# Patient Record
Sex: FEMALE | Race: WHITE | NOT HISPANIC OR LATINO | Employment: FULL TIME | URBAN - METROPOLITAN AREA
[De-identification: names, ages, dates, MRNs, and addresses within clinical notes are randomized per-mention and may not be internally consistent; named-entity substitution may affect disease eponyms.]

---

## 2019-05-27 ENCOUNTER — OFFICE VISIT (OUTPATIENT)
Dept: URGENT CARE | Facility: CLINIC | Age: 39
End: 2019-05-27
Payer: COMMERCIAL

## 2019-05-27 VITALS
HEIGHT: 69 IN | HEART RATE: 60 BPM | RESPIRATION RATE: 18 BRPM | WEIGHT: 150 LBS | OXYGEN SATURATION: 99 % | SYSTOLIC BLOOD PRESSURE: 125 MMHG | DIASTOLIC BLOOD PRESSURE: 76 MMHG | TEMPERATURE: 98 F | BODY MASS INDEX: 22.22 KG/M2

## 2019-05-27 DIAGNOSIS — J02.9 PHARYNGITIS, UNSPECIFIED ETIOLOGY: Primary | ICD-10-CM

## 2019-05-27 DIAGNOSIS — J02.9 SORE THROAT: ICD-10-CM

## 2019-05-27 LAB
CTP QC/QA: YES
S PYO RRNA THROAT QL PROBE: NEGATIVE

## 2019-05-27 PROCEDURE — 87880 STREP A ASSAY W/OPTIC: CPT | Mod: QW,S$GLB,, | Performed by: FAMILY MEDICINE

## 2019-05-27 PROCEDURE — 87880 POCT RAPID STREP A: ICD-10-PCS | Mod: QW,S$GLB,, | Performed by: FAMILY MEDICINE

## 2019-05-27 PROCEDURE — 3008F BODY MASS INDEX DOCD: CPT | Mod: CPTII,S$GLB,, | Performed by: FAMILY MEDICINE

## 2019-05-27 PROCEDURE — 99214 PR OFFICE/OUTPT VISIT, EST, LEVL IV, 30-39 MIN: ICD-10-PCS | Mod: S$GLB,,, | Performed by: FAMILY MEDICINE

## 2019-05-27 PROCEDURE — 3008F PR BODY MASS INDEX (BMI) DOCUMENTED: ICD-10-PCS | Mod: CPTII,S$GLB,, | Performed by: FAMILY MEDICINE

## 2019-05-27 PROCEDURE — 99000 PR SPECIMEN HANDLING,DR OFF->LAB: ICD-10-PCS | Mod: S$GLB,,, | Performed by: FAMILY MEDICINE

## 2019-05-27 PROCEDURE — 99000 SPECIMEN HANDLING OFFICE-LAB: CPT | Mod: S$GLB,,, | Performed by: FAMILY MEDICINE

## 2019-05-27 PROCEDURE — 99214 OFFICE O/P EST MOD 30 MIN: CPT | Mod: S$GLB,,, | Performed by: FAMILY MEDICINE

## 2019-05-27 PROCEDURE — 87081 CULTURE SCREEN ONLY: CPT

## 2019-05-27 RX ORDER — MULTIVITAMIN
1 TABLET ORAL DAILY
COMMUNITY

## 2019-05-27 RX ORDER — AMOXICILLIN 500 MG
CAPSULE ORAL DAILY
COMMUNITY

## 2019-05-27 RX ORDER — MAGNESIUM 250 MG
TABLET ORAL ONCE
COMMUNITY

## 2019-05-27 NOTE — PATIENT INSTRUCTIONS

## 2019-05-27 NOTE — PROGRESS NOTES
"Subjective:       Patient ID: Wendy Munoz is a 39 y.o. female.    Vitals:  height is 5' 9" (1.753 m) and weight is 68 kg (150 lb). Her tympanic temperature is 97.7 °F (36.5 °C). Her blood pressure is 125/76 and her pulse is 60. Her respiration is 18 and oxygen saturation is 99%.     Chief Complaint: Sore Throat    Patient presents with c.o sore throat that started on Friday. + for pain when swallowing and cervical lymph node pain. Denies fever. Has had strep three times in the within the year. Sxs feel similar. No cough    Sore Throat    This is a new problem. Episode onset: Friday. The problem has been unchanged. The pain is worse on the left side. There has been no fever. Associated symptoms include ear pain. Pertinent negatives include no congestion, coughing, shortness of breath, stridor or vomiting. She has tried NSAIDs for the symptoms. The treatment provided moderate relief.       Constitution: Positive for fatigue. Negative for chills, sweating and fever.   HENT: Positive for ear pain and sore throat. Negative for congestion, sinus pain, sinus pressure and voice change.         Runny nose   Neck: Negative for painful lymph nodes.   Eyes: Negative for eye redness.   Respiratory: Negative for chest tightness, cough, sputum production, bloody sputum, COPD, shortness of breath, stridor, wheezing and asthma.    Gastrointestinal: Negative for nausea and vomiting.   Musculoskeletal: Negative for muscle ache.   Skin: Negative for rash.   Allergic/Immunologic: Negative for seasonal allergies and asthma.   Hematologic/Lymphatic: Negative for swollen lymph nodes.       Objective:      Physical Exam   Constitutional: She appears well-developed and well-nourished.   HENT:   Head: Normocephalic and atraumatic.   Right Ear: External ear normal.   Left Ear: External ear normal.   Mild erythema of pharynx, no exudate , noTurbinate edema and congestion   Eyes: Pupils are equal, round, and reactive to light. " Conjunctivae and EOM are normal.   Neck: Normal range of motion. Neck supple. No thyromegaly present.   Cardiovascular: Normal rate, regular rhythm, normal heart sounds and intact distal pulses.   Pulmonary/Chest: Effort normal and breath sounds normal.   Lymphadenopathy:     She has cervical adenopathy (shoddy anterior cervical adenopathy on the left).   Psychiatric: She has a normal mood and affect. Her behavior is normal. Judgment and thought content normal.   Vitals reviewed.      Assessment:       1. Pharyngitis, unspecified etiology    2. Sore throat        Plan:         Pharyngitis, unspecified etiology  -     Strep A culture, throat    Sore throat  -     POCT rapid strep A    will wait on culture results - to treat since throat is only minimally red and there is no exudate and no fever- if sore throat persists for several weeks and yet culture is negative then recommend ENT eval -I explained this to pt and she agrees to do so      Patient Instructions     Pharyngitis (Sore Throat), Report Pending    Pharyngitis (sore throat) is often due to a virus. It can also be caused by the streptococcus, or strep, bacterium, often called strep throat. Both viral and strep infections can cause throat pain that is worse when swallowing, aching all over with headache, and fever. Both types of infections are contagious. They may be spread by coughing, kissing, or touching others after touching your mouth or nose.  A test has been done to find out whether you (or your child, if your child is the patient) have strep throat. Call this facility or your healthcare provider if you were not given your test results. If the test is positive for strep infection, you will need to take antibiotic medicines. A prescription can be called into your pharmacy at that time. If the test is negative, you probably have a viral pharyngitis. This does not need to be treated with antibiotics. Until you receive the results of the strep test, you  should stay home from work. If your child is being tested, he or she should stay home from school.  Home care  · Rest at home. Drink plenty of fluids so you won't get dehydrated.  · If the test is positive for strep, don't go to work or school for the first 2 days of taking the antibiotics. After this time, you will not be contagious. You can then return to work or school if you are feeling better.   · Take the antibiotic medicine for the full 10 days, even if you feel better. This is very important to make sure the infection is treated. It is also important to prevent drug-resistant germs from developing. If you were given an antibiotic shot, you won't need more antibiotics.  · For children: Use acetaminophen for fever, fussiness, or discomfort. In infants older than 6 months of age, you may use ibuprofen instead of acetaminophen. Talk with your child's healthcare provider before giving these medicines if your child has chronic liver or kidney disease or ever had a stomach ulcer or GI bleeding. Never give aspirin to a child under 18 years of age who is ill with a fever. It may cause severe liver damage.  · For adults: Use acetaminophen or ibuprofen to control pain or fever, unless another medicine was prescribed for this. Talk with your healthcare provider before taking these medicines if you have chronic liver or kidney disease or ever had a stomach ulcer or GI bleeding.  · Use throat lozenges or numbing throat sprays to help reduce pain. Gargling with warm salt water will also help reduce throat pain. For this, dissolve 1/2 teaspoon of salt in 1 glass of warm water. To help soothe a sore throat, children can sip on juice or a popsicle. Children 5 years and older can also suck on a lollipop or hard candy.  · Don't eat salty or spicy foods. These can irritate the throat.  Follow-up care  Follow up with your healthcare provider or our staff if you don't get better over the next week.  When to seek medical  advice  Call your healthcare provider right away if any of these occur:  · Fever as directed by your healthcare provider. For children, seek care if:  ¨ Your child is of any age and has repeated fevers above 104°F (40°C).  ¨ Your child is younger than 2 years of age and has a fever of 100.4°F (38°C) that continues for more than 1 day.  ¨ Your child is 2 years old or older and has a fever of 100.4°F (38°C) that continues for more than 3 days.  · New or worsening ear pain, sinus pain, or headache  · Painful lumps in the back of neck  · Stiff neck  · Lymph nodes are getting larger  · Inability to swallow liquids, excessive drooling, or inability to open mouth wide due to throat pain  · Signs of dehydration (very dark urine or no urine, sunken eyes, dizziness)  · Trouble breathing or noisy breathing  · Muffled voice  · New rash  · Child appears to be getting sicker  Date Last Reviewed: 4/13/2015  © 7012-7937 [employer]. [address] [place] [address], [place] [address]. All rights reserved. This information is not intended as a substitute for professional medical care. Always follow your healthcare professional's instructions.

## 2019-05-30 ENCOUNTER — TELEPHONE (OUTPATIENT)
Dept: URGENT CARE | Facility: CLINIC | Age: 39
End: 2019-05-30

## 2019-05-30 LAB — BACTERIA THROAT CULT: NORMAL

## 2019-05-30 NOTE — TELEPHONE ENCOUNTER
----- Message from Cait Smith NP sent at 5/29/2019  3:08 PM CDT -----  Please let patient know that throat culture is negative.  Ask how she is doing-- if still having sore throat, please have patient follow up with ENT as recommended by Dr. Mckeon.     Cait Smith

## 2019-06-01 ENCOUNTER — TELEPHONE (OUTPATIENT)
Dept: URGENT CARE | Facility: CLINIC | Age: 39
End: 2019-06-01

## 2019-06-01 NOTE — TELEPHONE ENCOUNTER
----- Message from Orin Mckeon, DO sent at 5/30/2019 10:06 PM CDT -----  Please let patient know that throat culture is negative.  Ask how she is doing-- if still having sore throat, please have patient follow up with ENT   Dr Mckeon

## 2019-06-02 ENCOUNTER — TELEPHONE (OUTPATIENT)
Dept: URGENT CARE | Facility: CLINIC | Age: 39
End: 2019-06-02

## 2019-09-04 ENCOUNTER — OFFICE VISIT (OUTPATIENT)
Dept: DERMATOLOGY | Facility: CLINIC | Age: 39
End: 2019-09-04
Payer: COMMERCIAL

## 2019-09-04 DIAGNOSIS — D22.9 MULTIPLE BENIGN NEVI: ICD-10-CM

## 2019-09-04 DIAGNOSIS — D48.5 NEOPLASM OF UNCERTAIN BEHAVIOR OF SKIN: ICD-10-CM

## 2019-09-04 DIAGNOSIS — L82.1 SK (SEBORRHEIC KERATOSIS): ICD-10-CM

## 2019-09-04 DIAGNOSIS — Z12.83 SKIN CANCER SCREENING: Primary | ICD-10-CM

## 2019-09-04 PROCEDURE — 11102 TANGNTL BX SKIN SINGLE LES: CPT | Mod: S$GLB,,, | Performed by: DERMATOLOGY

## 2019-09-04 PROCEDURE — 99999 PR PBB SHADOW E&M-EST. PATIENT-LVL III: CPT | Mod: PBBFAC,,, | Performed by: DERMATOLOGY

## 2019-09-04 PROCEDURE — 88305 TISSUE SPECIMEN TO PATHOLOGY, DERMATOLOGY: ICD-10-PCS | Mod: 26,,, | Performed by: PATHOLOGY

## 2019-09-04 PROCEDURE — 88305 TISSUE EXAM BY PATHOLOGIST: CPT | Performed by: PATHOLOGY

## 2019-09-04 PROCEDURE — 99999 PR PBB SHADOW E&M-EST. PATIENT-LVL III: ICD-10-PCS | Mod: PBBFAC,,, | Performed by: DERMATOLOGY

## 2019-09-04 PROCEDURE — 88305 TISSUE EXAM BY PATHOLOGIST: CPT | Mod: 26,,, | Performed by: PATHOLOGY

## 2019-09-04 PROCEDURE — 11103 PR TANGENTIAL BIOPSY, SKIN, EA ADDTL LESION: ICD-10-PCS | Mod: S$GLB,,, | Performed by: DERMATOLOGY

## 2019-09-04 PROCEDURE — 11102 PR TANGENTIAL BIOPSY, SKIN, SINGLE LESION: ICD-10-PCS | Mod: S$GLB,,, | Performed by: DERMATOLOGY

## 2019-09-04 PROCEDURE — 11103 TANGNTL BX SKIN EA SEP/ADDL: CPT | Mod: S$GLB,,, | Performed by: DERMATOLOGY

## 2019-09-04 PROCEDURE — 99203 OFFICE O/P NEW LOW 30 MIN: CPT | Mod: 25,S$GLB,, | Performed by: DERMATOLOGY

## 2019-09-04 PROCEDURE — 99203 PR OFFICE/OUTPT VISIT, NEW, LEVL III, 30-44 MIN: ICD-10-PCS | Mod: 25,S$GLB,, | Performed by: DERMATOLOGY

## 2019-09-04 NOTE — PROGRESS NOTES
"  Subjective:       Patient ID:  Wnedy Munoz is a 39 y.o. female who presents for   Chief Complaint   Patient presents with    Mole     Patient is here today for a "mole" check.   Pt has a history of  moderate sun exposure in the past.   Pt recalls several blistering sunburns in the past- Yes  Pt has history of tanning bed use- No  Pt has  had moles removed in the past- Yes  Pt has history of melanoma in first degree relatives-  Not that she is aware of    Pt here today for skin check    H/o BCC right clavicle s/p ED&C 03/2018  Pt states she had a mole on her back tested without a biopsy and was told it didn't have any mutations associated with melanoma.        Review of Systems   Constitutional: Negative for fever, chills, weight loss, weight gain, fatigue, night sweats and malaise.   Skin: Positive for sun sensitivity and daily sunscreen use. Negative for recent sunburn and wears hat.   Hematologic/Lymphatic: Does not bruise/bleed easily.        Objective:    Physical Exam   Constitutional: She appears well-developed and well-nourished. No distress.   Genitourinary:         Neurological: She is alert and oriented to person, place, and time. She is not disoriented.   Psychiatric: She has a normal mood and affect.   Skin:   Areas Examined (abnormalities noted in diagram):   Scalp / Hair Palpated and Inspected  Head / Face Inspection Performed  Neck Inspection Performed  Chest / Axilla Inspection Performed  Abdomen Inspection Performed  Genitals / Buttocks / Groin Inspection Performed  Back Inspection Performed  RUE Inspected  LUE Inspection Performed  RLE Inspected  LLE Inspection Performed  Nails and Digits Inspection Performed                      Diagram Legend     Erythematous scaling macule/papule c/w actinic keratosis       Vascular papule c/w angioma      Pigmented verrucoid papule/plaque c/w seborrheic keratosis      Yellow umbilicated papule c/w sebaceous hyperplasia      Irregularly shaped tan " macule c/w lentigo     1-2 mm smooth white papules consistent with Milia      Movable subcutaneous cyst with punctum c/w epidermal inclusion cyst      Subcutaneous movable cyst c/w pilar cyst      Firm pink to brown papule c/w dermatofibroma      Pedunculated fleshy papule(s) c/w skin tag(s)      Evenly pigmented macule c/w junctional nevus     Mildly variegated pigmented, slightly irregular-bordered macule c/w mildly atypical nevus      Flesh colored to evenly pigmented papule c/w intradermal nevus       Pink pearly papule/plaque c/w basal cell carcinoma      Erythematous hyperkeratotic cursted plaque c/w SCC      Surgical scar with no sign of skin cancer recurrence      Open and closed comedones      Inflammatory papules and pustules      Verrucoid papule consistent consistent with wart     Erythematous eczematous patches and plaques     Dystrophic onycholytic nail with subungual debris c/w onychomycosis     Umbilicated papule    Erythematous-base heme-crusted tan verrucoid plaque consistent with inflamed seborrheic keratosis     Erythematous Silvery Scaling Plaque c/w Psoriasis     See annotation      Assessment / Plan:      Neoplasm of uncertain behavior of skin  Shave biopsy procedure note:    Shave biopsy performed after verbal consent including risk of infection, scar, recurrence, need for additional treatment of site. Area prepped with alcohol, anesthetized with approximately 1.0cc of 1% lidocaine with epinephrine. Lesional tissue shaved with razor blade. Hemostasis achieved with application of aluminum chloride followed by hyfrecation. No complications. Dressing applied. Wound care explained.  -     Tissue Specimen To Pathology, Dermatology  -     Tissue Specimen To Pathology, Dermatology  Pathology Orders:     Normal Orders This Visit    Tissue Specimen To Pathology, Dermatology     Questions:    Directional Terms:  Other(comment)    Clinical Information:  r/o atypical nevus Comment - shave    Specific  Site:  R mid back    Tissue Specimen To Pathology, Dermatology     Questions:    Directional Terms:  Other(comment)    Clinical Information:  r/o IDN vs. other Comment - shave    Specific Site:  L axilla        Skin cancer screening  Total body skin examination performed today including at least 12 points as noted in physical examination. Suspicious lesions noted above.  Patient instructed in importance of daily broad spectrum sunscreen use with spf at least 30. Sun avoidance and topical protection/protective clothing discussed.    Multiple benign nevi  Benign-appearing on exam today. Counseled pt to monitor mole(s) and return to clinic if any changes noted or symptoms (bleeding, itching, pain, etc) noted. Brochure provided.    SK (seborrheic keratosis)  These are benign inherited growths without a malignant potential. Reassurance given to patient. No treatment is necessary.   Treatment of benign, asymptomatic lesions may be considered cosmetic.  Warned about risk of hypo- or hyperpigmentation with treatment and risk of recurrence.      Follow up in about 1 year (around 9/4/2020) for skin check or sooner pending biopsy results.

## 2019-09-04 NOTE — PATIENT INSTRUCTIONS
Summer Sun Protection      The [facility] would like to remind you of the importance of sun protection all year round and particularly during the summer when the suns rays are the strongest. It has been proven that both acute and chronic sun exposure damages our cells and leads to skin cancer. Beyond skin cancer, the sun causes 90% of the symptoms of pre-mature skin aging, including wrinkles, lentigines (brown spots), and thin, easily bruised skin. Proper sun protection can help prevent these unwanted conditions.    Many patients report that the dont go in the sun. It has been shown that the average person receives 18 hours of incidental sun exposure per week during activities such as walking through parking lots, driving, or sitting next to windows. This accumulates to several bad sunburns per year!    In choosing sunscreen, you want one that protects against both UVA and UVB rays. It is recommended that you use one of SPF 30 or higher. It is important to apply the sunscreen about 20 minutes prior to sun exposure. Most sunscreens are chemical sunscreens and a reaction must take place in the skin so that they are effective. If they are applied and then you are immediately exposed to the sun or start sweating, this reaction has not had time to take place and you are therefore unprotected. Sunscreen needs to be reapplied every 2 hours if you are participating in water sports or sweating. We recommend Elta MD or Neutrogena Ultra Sheer Dry Touch SPF 55 for daily use; however there are many options and it is most important for you to find one that you will use on a consistent basis.    If you have sensitive skin, you may do best with a sunscreen that contains only physical blockers such as titanium dioxide or zinc oxide. These are typically thicker and harder to apply, however they afford very good protection. Neutrogena Sensitive Skin, Blue Lizard Sensitive Skin (pink top) or Neutrogena Pure  "and Free are popular ones.     Aside from sunscreen, clothes with UV protection, wide brimmed hats, and sunglasses are other means of sun protection that we recommend.                        [facility] - DERMATOLOGY  [address]  [place] [address]  Dept: [phone]  Dept Fax: [phone]                                                                                Shave Biopsy Wound Care    Your doctor has performed a shave biopsy today.  A band aid and vaseline ointment has been placed over the site.  This should remain in place for 24 hours.  It is recommended that you keep the area dry for the first 24 hours.  After 24 hours, you may remove the band aid and wash the area with warm soap and water and apply Vaseline jelly.  Many patients prefer to use Neosporin or Bacitracin ointment.  This is acceptable; however, know that you can develop an allergy to this medication even if you have used it safely for years.  It is important to keep the area moist.  Letting it dry out and get air slows healing time, and will worsen the scar.  Band aid is optional after first 24 hours.      If you notice increasing redness, tenderness, pain, or yellow drainage at the biopsy site, please notify your doctor.  These are signs of an infection.    If your biopsy site is bleeding, apply firm pressure for 15 minutes straight.  Repeat for another 15 minutes, if it is still bleeding.   If the surgical site continues to bleed, then please contact your doctor.      For [facility] users:   You will receive a [facility] notification after the pathologist has finished reviewing your biopsy specimen. Pathology results, however, will not be released online so you will see a "no content" message. Once your dermatologist reviews and clinically correlates your biopsy results, you will either receive a letter in the mail with the results of a phone call from your doctor's office if further explanation or " treatment is warranted.       [address] [place] [address]/ [phone] FAX/ [email or web address]

## 2019-10-03 ENCOUNTER — CLINICAL SUPPORT (OUTPATIENT)
Dept: INTERNAL MEDICINE | Facility: CLINIC | Age: 39
End: 2019-10-03

## 2019-10-03 ENCOUNTER — OFFICE VISIT (OUTPATIENT)
Dept: INTERNAL MEDICINE | Facility: CLINIC | Age: 39
End: 2019-10-03

## 2019-10-03 VITALS
HEIGHT: 69 IN | WEIGHT: 142 LBS | DIASTOLIC BLOOD PRESSURE: 60 MMHG | BODY MASS INDEX: 21.03 KG/M2 | SYSTOLIC BLOOD PRESSURE: 90 MMHG | HEART RATE: 58 BPM

## 2019-10-03 DIAGNOSIS — Z00.00 ROUTINE GENERAL MEDICAL EXAMINATION AT A HEALTH CARE FACILITY: Primary | ICD-10-CM

## 2019-10-03 DIAGNOSIS — Z02.1 ENCOUNTER FOR PRE-EMPLOYMENT EXAMINATION: Primary | ICD-10-CM

## 2019-10-03 PROBLEM — G43.909 MIGRAINE HEADACHE: Status: ACTIVE | Noted: 2019-10-03

## 2019-10-03 LAB
ALBUMIN SERPL BCP-MCNC: 4.5 G/DL (ref 3.5–5.2)
ALP SERPL-CCNC: 62 U/L (ref 55–135)
ALT SERPL W/O P-5'-P-CCNC: 16 U/L (ref 10–44)
ANION GAP SERPL CALC-SCNC: 8 MMOL/L (ref 8–16)
AST SERPL-CCNC: 18 U/L (ref 10–40)
BILIRUB SERPL-MCNC: 0.5 MG/DL (ref 0.1–1)
BUN SERPL-MCNC: 14 MG/DL (ref 6–20)
CALCIUM SERPL-MCNC: 9.7 MG/DL (ref 8.7–10.5)
CHLORIDE SERPL-SCNC: 104 MMOL/L (ref 95–110)
CHOLEST SERPL-MCNC: 158 MG/DL (ref 120–199)
CHOLEST/HDLC SERPL: 2.3 {RATIO} (ref 2–5)
CO2 SERPL-SCNC: 25 MMOL/L (ref 23–29)
CREAT SERPL-MCNC: 1 MG/DL (ref 0.5–1.4)
ERYTHROCYTE [DISTWIDTH] IN BLOOD BY AUTOMATED COUNT: 12.1 % (ref 11.5–14.5)
EST. GFR  (AFRICAN AMERICAN): >60 ML/MIN/1.73 M^2
EST. GFR  (NON AFRICAN AMERICAN): >60 ML/MIN/1.73 M^2
ESTIMATED AVG GLUCOSE: 91 MG/DL (ref 68–131)
GLUCOSE SERPL-MCNC: 96 MG/DL (ref 70–110)
HBA1C MFR BLD HPLC: 4.8 % (ref 4–5.6)
HCT VFR BLD AUTO: 40.3 % (ref 37–48.5)
HDLC SERPL-MCNC: 69 MG/DL (ref 40–75)
HDLC SERPL: 43.7 % (ref 20–50)
HGB BLD-MCNC: 13.9 G/DL (ref 12–16)
LDLC SERPL CALC-MCNC: 79.8 MG/DL (ref 63–159)
MCH RBC QN AUTO: 30.5 PG (ref 27–31)
MCHC RBC AUTO-ENTMCNC: 34.5 G/DL (ref 32–36)
MCV RBC AUTO: 88 FL (ref 82–98)
NONHDLC SERPL-MCNC: 89 MG/DL
PLATELET # BLD AUTO: 292 K/UL (ref 150–350)
PMV BLD AUTO: 9.3 FL (ref 9.2–12.9)
POTASSIUM SERPL-SCNC: 3.9 MMOL/L (ref 3.5–5.1)
PROT SERPL-MCNC: 8.4 G/DL (ref 6–8.4)
RBC # BLD AUTO: 4.56 M/UL (ref 4–5.4)
SODIUM SERPL-SCNC: 137 MMOL/L (ref 136–145)
TRIGL SERPL-MCNC: 46 MG/DL (ref 30–150)
WBC # BLD AUTO: 7.35 K/UL (ref 3.9–12.7)

## 2019-10-03 PROCEDURE — 99385 PREV VISIT NEW AGE 18-39: CPT | Mod: S$GLB,,, | Performed by: INTERNAL MEDICINE

## 2019-10-03 PROCEDURE — 99999 PR PBB SHADOW E&M-EST. PATIENT-LVL III: ICD-10-PCS | Mod: PBBFAC,,, | Performed by: INTERNAL MEDICINE

## 2019-10-03 PROCEDURE — 85027 COMPLETE CBC AUTOMATED: CPT

## 2019-10-03 PROCEDURE — 80053 COMPREHEN METABOLIC PANEL: CPT

## 2019-10-03 PROCEDURE — 83036 HEMOGLOBIN GLYCOSYLATED A1C: CPT

## 2019-10-03 PROCEDURE — 80061 LIPID PANEL: CPT

## 2019-10-03 PROCEDURE — 99999 PR PBB SHADOW E&M-EST. PATIENT-LVL III: CPT | Mod: PBBFAC,,, | Performed by: INTERNAL MEDICINE

## 2019-10-03 PROCEDURE — 99385 PR PREVENTIVE VISIT,NEW,18-39: ICD-10-PCS | Mod: S$GLB,,, | Performed by: INTERNAL MEDICINE

## 2019-10-03 NOTE — PATIENT INSTRUCTIONS
Here are the results of your recent labs and tests.   Any significant abnormalities have been discussed and those recommendations are found in the diagnosis list on this visit summary.      Recent Results (from the past 168 hour(s))   Comprehensive metabolic panel    Collection Time: 10/03/19  8:59 AM   Result Value Ref Range    Sodium 137 136 - 145 mmol/L    Potassium 3.9 3.5 - 5.1 mmol/L    Chloride 104 95 - 110 mmol/L    CO2 25 23 - 29 mmol/L    Glucose 96 70 - 110 mg/dL    BUN, Bld 14 6 - 20 mg/dL    Creatinine 1.0 0.5 - 1.4 mg/dL    Calcium 9.7 8.7 - 10.5 mg/dL    Total Protein 8.4 6.0 - 8.4 g/dL    Albumin 4.5 3.5 - 5.2 g/dL    Total Bilirubin 0.5 0.1 - 1.0 mg/dL    Alkaline Phosphatase 62 55 - 135 U/L    AST 18 10 - 40 U/L    ALT 16 10 - 44 U/L    Anion Gap 8 8 - 16 mmol/L    eGFR if African American >60.0 >60 mL/min/1.73 m^2    eGFR if non African American >60.0 >60 mL/min/1.73 m^2   CBC Without Differential    Collection Time: 10/03/19  8:59 AM   Result Value Ref Range    WBC 7.35 3.90 - 12.70 K/uL    RBC 4.56 4.00 - 5.40 M/uL    Hemoglobin 13.9 12.0 - 16.0 g/dL    Hematocrit 40.3 37.0 - 48.5 %    Mean Corpuscular Volume 88 82 - 98 fL    Mean Corpuscular Hemoglobin 30.5 27.0 - 31.0 pg    Mean Corpuscular Hemoglobin Conc 34.5 32.0 - 36.0 g/dL    RDW 12.1 11.5 - 14.5 %    Platelets 292 150 - 350 K/uL    MPV 9.3 9.2 - 12.9 fL   Lipid panel    Collection Time: 10/03/19  8:59 AM   Result Value Ref Range    Cholesterol 158 120 - 199 mg/dL    Triglycerides 46 30 - 150 mg/dL    HDL 69 40 - 75 mg/dL    LDL Cholesterol 79.8 63.0 - 159.0 mg/dL    Hdl/Cholesterol Ratio 43.7 20.0 - 50.0 %    Total Cholesterol/HDL Ratio 2.3 2.0 - 5.0    Non-HDL Cholesterol 89 mg/dL   Hemoglobin A1c    Collection Time: 10/03/19  8:59 AM   Result Value Ref Range    Hemoglobin A1C 4.8 4.0 - 5.6 %    Estimated Avg Glucose 91 68 - 131 mg/dL

## 2019-10-03 NOTE — PROGRESS NOTES
"Wendy Munoz is a 39 y.o. White female who presents for an Executive health visit with no  other complaints today.   All chronic problems as listed in the active problem list have been stable and well controlled recently. The active problem list was reviewed and reconciled today and is up to date as of today.  Patient Active Problem List   Diagnosis    Migraine headache     Other issues addressed today:NONE.   She denies any recent ER visits or hospitalizations.     She states that she is compliant with all prescribed medications and she has experienced no side effects. I have reviewed all current medications and updated the current medication list during this encounter.     PMFSH: all information reviewed and updated today.     All labs and tests from earlier today reviewed. Abnormalities (if any) are addressed in the assessment and plan.     Review of Systems   Constitutional: Negative for appetite change, chills, fatigue and fever.   HENT: Negative for congestion, hearing loss, postnasal drip, sore throat and tinnitus.    Eyes: Negative for pain, discharge and visual disturbance.   Respiratory: Negative for cough, chest tightness and wheezing.    Cardiovascular: Negative for chest pain, palpitations and leg swelling.   Gastrointestinal: Negative for abdominal pain, blood in stool, diarrhea, nausea and vomiting.   Genitourinary: Negative for dysuria, frequency and urgency.   Musculoskeletal: Negative for arthralgias and back pain.   Skin: Negative for rash.   Neurological: Negative for dizziness, syncope, numbness and headaches.   Hematological: Negative for adenopathy.   Psychiatric/Behavioral: Negative for dysphoric mood and sleep disturbance. The patient is not nervous/anxious.      Vitals:    10/03/19 0902   BP: 90/60   BP Location: Left arm   Patient Position: Sitting   BP Method: Medium (Manual)   Pulse: (!) 58   Weight: 64.4 kg (141 lb 15.6 oz)   Height: 5' 9" (1.753 m)    Body mass index is 20.97 " kg/m².    Physical Exam   Constitutional: She is oriented to person, place, and time. She appears well-developed and well-nourished.   HENT:   Head: Normocephalic and atraumatic.   Right Ear: External ear normal.   Left Ear: External ear normal.   Eyes: Pupils are equal, round, and reactive to light. EOM are normal.   Neck: Neck supple. No JVD present. No thyromegaly present.   Cardiovascular: Normal rate and regular rhythm.   Pulmonary/Chest: Effort normal and breath sounds normal. She has no wheezes. She has no rales.   Abdominal: Soft. Bowel sounds are normal.   Lymphadenopathy:     She has no cervical adenopathy.   Neurological: She is alert and oriented to person, place, and time.   Psychiatric: She has a normal mood and affect. Her behavior is normal.   Vitals reviewed.    ASSESSMENT/PLAN:  Wendy was seen today for executive health.    Diagnoses and all orders for this visit:    Encounter for pre-employment examination    healthy female. F/u with PCP/GYN for well woman exams.   Goals Addressed                 This Visit's Progress     Blood Pressure < 130/80   90/60     Exercise at least 150 minutes per week.        LDL CHOLESTEROL < 160        Maintane normal body weight   On track

## 2019-10-03 NOTE — LETTER
October 3, 2019    Wendy Munoz  [address]  [facility] [id number]             [facility] - Internal Medicine  [address]  [facility] [address]  Phone: [phone]  Fax: [phone] Dear Dr. Munoz:    Thank you for allowing me to serve you and perform your Executive Health exam on 10/3/2019.  Attached to this letter you will find a copy of your After Visit Summary which includes a copy of all labs and other results, your current medical problem list (if any), a list of all diagnoses from today (if any), a list of all current medication, your vital signs (including Blood pressure, Height, weight, BMI) and associated plans/goals that we discussed while you were in the office.     Please continue follow up with your primary care physician for any chronic medical problems and for recommended routine health screenings.         If you have any questions or concerns, please don't hesitate to call.    Sincerely,        FARZANEH Dunn II, MD

## 2020-01-27 ENCOUNTER — OFFICE VISIT (OUTPATIENT)
Dept: PRIMARY CARE CLINIC | Facility: CLINIC | Age: 40
End: 2020-01-27
Payer: COMMERCIAL

## 2020-01-27 VITALS
HEIGHT: 69 IN | DIASTOLIC BLOOD PRESSURE: 72 MMHG | RESPIRATION RATE: 18 BRPM | TEMPERATURE: 99 F | OXYGEN SATURATION: 98 % | SYSTOLIC BLOOD PRESSURE: 115 MMHG | HEART RATE: 70 BPM | WEIGHT: 149.94 LBS | BODY MASS INDEX: 22.21 KG/M2

## 2020-01-27 DIAGNOSIS — J02.9 ACUTE PHARYNGITIS, UNSPECIFIED ETIOLOGY: Primary | ICD-10-CM

## 2020-01-27 DIAGNOSIS — Z20.818 EXPOSURE TO STREP THROAT: ICD-10-CM

## 2020-01-27 DIAGNOSIS — J03.01 RECURRENT STREPTOCOCCAL TONSILLITIS: ICD-10-CM

## 2020-01-27 PROCEDURE — 3008F BODY MASS INDEX DOCD: CPT | Mod: CPTII,S$GLB,, | Performed by: INTERNAL MEDICINE

## 2020-01-27 PROCEDURE — 99213 PR OFFICE/OUTPT VISIT, EST, LEVL III, 20-29 MIN: ICD-10-PCS | Mod: S$GLB,,, | Performed by: INTERNAL MEDICINE

## 2020-01-27 PROCEDURE — 87147 CULTURE TYPE IMMUNOLOGIC: CPT

## 2020-01-27 PROCEDURE — 99999 PR PBB SHADOW E&M-EST. PATIENT-LVL IV: ICD-10-PCS | Mod: PBBFAC,,, | Performed by: INTERNAL MEDICINE

## 2020-01-27 PROCEDURE — 3008F PR BODY MASS INDEX (BMI) DOCUMENTED: ICD-10-PCS | Mod: CPTII,S$GLB,, | Performed by: INTERNAL MEDICINE

## 2020-01-27 PROCEDURE — 99213 OFFICE O/P EST LOW 20 MIN: CPT | Mod: S$GLB,,, | Performed by: INTERNAL MEDICINE

## 2020-01-27 PROCEDURE — 99999 PR PBB SHADOW E&M-EST. PATIENT-LVL IV: CPT | Mod: PBBFAC,,, | Performed by: INTERNAL MEDICINE

## 2020-01-27 PROCEDURE — 87081 CULTURE SCREEN ONLY: CPT

## 2020-01-27 RX ORDER — AMOXICILLIN AND CLAVULANATE POTASSIUM 875; 125 MG/1; MG/1
1 TABLET, FILM COATED ORAL 2 TIMES DAILY
Qty: 20 TABLET | Refills: 0 | Status: SHIPPED | OUTPATIENT
Start: 2020-01-27 | End: 2020-02-06

## 2020-01-27 NOTE — PROGRESS NOTES
Subjective:       Patient ID: Wendy Munoz is a 40 y.o. female.    Chief Complaint: Sore Throat (Since Friday)    Presents urgently c/o sore throat. Her son has culture positive Strep pharyngitis. Her symptoms began three days ago with malaise and sore throat. Also has mild runny nose, no cough. Nausea, no vomiting or diarrhea. Uses Ibuprofen 800mg q 8 hours routinely, so no fever. She requests treatment for Strep throat due to known exposure, and requests referral to ENT because this is her sixth episode in 18 months.     PMH: no chronic medical conditions.   Allergies: Latex.   Non-smoker.     Review of Systems    Objective:    /72, Pulse 70, Temp 98.7  Physical Exam   Constitutional: She appears well-nourished. No distress.   HENT:   Nose: Nose normal.   Mouth/Throat: No oropharyngeal exudate.   Mild injection of oropharynx with no tonsillar edema, no exudate.    Eyes: Conjunctivae are normal. Right eye exhibits no discharge. Left eye exhibits no discharge.   Neck: Normal range of motion. Neck supple.   Mildly tender submandibular gland on left, not swollen.    Cardiovascular: Normal rate, regular rhythm and normal heart sounds.   Pulmonary/Chest: Effort normal and breath sounds normal. No respiratory distress. She has no wheezes. She has no rales.   Skin: Skin is warm and dry.       Assessment:       1. Acute pharyngitis, unspecified etiology    2. Exposure to strep throat    3. Recurrent streptococcal tonsillitis        Plan:       Acute pharyngitis, unspecified etiology  -     Strep A culture, throat  -     amoxicillin-clavulanate 875-125mg (AUGMENTIN) 875-125 mg per tablet; Take 1 tablet by mouth 2 (two) times daily. for 10 days  Dispense: 20 tablet; Refill: 0  -     Continue Ibuprofen prn.    Exposure to strep throat  -     Strep A culture, throat    Recurrent streptococcal tonsillitis  -     Ambulatory referral to ENT

## 2020-01-29 ENCOUNTER — PATIENT MESSAGE (OUTPATIENT)
Dept: PRIMARY CARE CLINIC | Facility: CLINIC | Age: 40
End: 2020-01-29

## 2020-01-29 LAB — BACTERIA THROAT CULT: ABNORMAL

## 2020-02-11 ENCOUNTER — OFFICE VISIT (OUTPATIENT)
Dept: PRIMARY CARE CLINIC | Facility: CLINIC | Age: 40
End: 2020-02-11
Payer: COMMERCIAL

## 2020-02-11 VITALS
HEART RATE: 75 BPM | SYSTOLIC BLOOD PRESSURE: 105 MMHG | TEMPERATURE: 98 F | OXYGEN SATURATION: 98 % | WEIGHT: 144.81 LBS | DIASTOLIC BLOOD PRESSURE: 65 MMHG | BODY MASS INDEX: 21.45 KG/M2 | HEIGHT: 69 IN | RESPIRATION RATE: 18 BRPM

## 2020-02-11 DIAGNOSIS — Z77.21 EXPOSURE TO BODY FLUIDS BY CONTAMINATED HYPODERMIC NEEDLESTICK: ICD-10-CM

## 2020-02-11 DIAGNOSIS — Z11.4 ENCOUNTER FOR SCREENING FOR HIV: ICD-10-CM

## 2020-02-11 DIAGNOSIS — J03.01 RECURRENT STREPTOCOCCAL TONSILLITIS: ICD-10-CM

## 2020-02-11 DIAGNOSIS — J02.9 ACUTE PHARYNGITIS, UNSPECIFIED ETIOLOGY: Primary | ICD-10-CM
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
## 2020-02-11 PROCEDURE — 3008F BODY MASS INDEX DOCD: CPT | Mod: CPTII,S$GLB,, | Performed by: INTERNAL MEDICINE

## 2020-02-11 PROCEDURE — 3008F PR BODY MASS INDEX (BMI) DOCUMENTED: ICD-10-PCS | Mod: CPTII,S$GLB,, | Performed by: INTERNAL MEDICINE

## 2020-02-11 PROCEDURE — 99213 OFFICE O/P EST LOW 20 MIN: CPT | Mod: S$GLB,,, | Performed by: INTERNAL MEDICINE

## 2020-02-11 PROCEDURE — 99999 PR PBB SHADOW E&M-EST. PATIENT-LVL V: ICD-10-PCS | Mod: PBBFAC,,, | Performed by: INTERNAL MEDICINE

## 2020-02-11 PROCEDURE — 87081 CULTURE SCREEN ONLY: CPT

## 2020-02-11 PROCEDURE — 99999 PR PBB SHADOW E&M-EST. PATIENT-LVL V: CPT | Mod: PBBFAC,,, | Performed by: INTERNAL MEDICINE

## 2020-02-11 PROCEDURE — 99213 PR OFFICE/OUTPT VISIT, EST, LEVL III, 20-29 MIN: ICD-10-PCS | Mod: S$GLB,,, | Performed by: INTERNAL MEDICINE

## 2020-02-11 NOTE — PROGRESS NOTES
[facility] Note    Chief Complaint      Chief Complaint   Patient presents with    Sore Throat       History of Present Illness      Wendy Munoz is a 40 y.o. WF with recurrent Strep pharyngitis     Recurrent Strep Pharyngitis - She reportedly has had strep throat 6 times in the past yr.  Per pt, all but one were dx with a + Rapid strep or + Throat Cx. Started in summer if 2018.  Had recurrence Dec. 2018 and then Feb. 2019. She then had Strep Throat Tx in Dec. 2019 with Amoxil. 3 days later her symptoms returned and was tx with Augmentin x 10 days 1/4/20. Her son had step throat in Jan and she was tested and was tx with Augmentin again 1/27/20 which she completed last wk. Sx's of sore throat started on Friday.  + Nausea and lethargy. ? If carrier but I suspect she may be.  Will refer to ENT.  She prev had appt but had to cancel it.  Consider A/I referral.   Pt denies any other frequent infections.     HCM - Flu - 10/3/19;  MGM - none- + fam h/o Breast Ca - mom at 49 yo; PCP - none     Past Medical History:  Past Medical History:   Diagnosis Date    Migraine        Past Surgical History:   has a past surgical history that includes Hysteroscopy w/ polypectomy; Hernia repair; and Removal of intrauterine device (IUD).    Family History:  family history includes Breast cancer (age of onset: 50) in her mother; Hypertension in her father; Prostate cancer in her father.     Social History:  Social History     Tobacco Use    Smoking status: Never Smoker    Smokeless tobacco: Never Used   Substance Use Topics    Alcohol use: Yes     Drinks per session: 1 or 2     Comment: few times a week    Drug use: Never       Review of Systems   Constitutional: Positive for chills and malaise/fatigue. Negative for fever.   HENT: Positive for sore throat. Negative for ear pain.    Respiratory: Negative for cough.    Cardiovascular: Negative for chest pain and palpitations.   Gastrointestinal: Positive for  "nausea. Negative for abdominal pain and vomiting.   Musculoskeletal: Negative for joint pain and myalgias.   Neurological: Positive for headaches. Negative for dizziness.        Medications:  Outpatient Encounter Medications as of 2/11/2020   Medication Sig Dispense Refill    fish oil-omega-3 fatty acids 300-1,000 mg capsule Take by mouth once daily.      magnesium 250 mg Tab Take by mouth once.      multivitamin (ONE DAILY MULTIVITAMIN) per tablet Take 1 tablet by mouth once daily.       No facility-administered encounter medications on file as of 2/11/2020.        Current Outpatient Medications:     fish oil-omega-3 fatty acids 300-1,000 mg capsule, Take by mouth once daily., Disp: , Rfl:     magnesium 250 mg Tab, Take by mouth once., Disp: , Rfl:     multivitamin (ONE DAILY MULTIVITAMIN) per tablet, Take 1 tablet by mouth once daily., Disp: , Rfl:     Allergies:  Review of patient's allergies indicates:   Allergen Reactions    Latex, natural rubber      Rash/itching       Health Maintenance:    There is no immunization history on file for this patient.   Health Maintenance   Topic Date Due    Pap Smear with HPV Cotest  01/03/2002    TETANUS VACCINE  08/16/2026    Lipid Panel  Completed      Objective:      Vital Signs  Temp: 98 °F (36.7 °C)  Temp src: Oral  Pulse: 75  Resp: 18  SpO2: 98 %  BP: 105/65  BP Location: Right arm  Patient Position: Sitting  Pain Score: 0-No pain  Height and Weight  Height: 5' 9" (175.3 cm)  Weight: 65.7 kg (144 lb 13.5 oz)  BSA (Calculated - sq m): 1.79 sq meters  BMI (Calculated): 21.4  Weight in (lb) to have BMI = 25: 168.9]    Laboratory:  CBC:  Recent Labs   Lab 10/03/19  0859   WBC 7.35   RBC 4.56   Hemoglobin 13.9   Hematocrit 40.3   Platelets 292   Mean Corpuscular Volume 88   Mean Corpuscular Hemoglobin 30.5   Mean Corpuscular Hemoglobin Conc 34.5       CMP:  Recent Labs   Lab 10/03/19  0859   Glucose 96   Calcium 9.7   Albumin 4.5   Total Protein 8.4   Sodium 137 "   Potassium 3.9   CO2 25   Chloride 104   BUN, Bld 14   Creatinine 1.0   eGFR if African American >60.0   eGFR if non African American >60.0   Alkaline Phosphatase 62   ALT 16   AST 18   Total Bilirubin 0.5       URINALYSIS:              LIPIDS:  Recent Labs   Lab 10/03/19  0859   HDL 69   Cholesterol 158   Triglycerides 46   LDL Cholesterol 79.8   Hdl/Cholesterol Ratio 43.7   Non-HDL Cholesterol 89   Total Cholesterol/HDL Ratio 2.3       TSH:        A1C:  Recent Labs   Lab 10/03/19  0859   Hemoglobin A1C 4.8     Physical Exam   Constitutional: She is oriented to person, place, and time. She appears well-developed and well-nourished. No distress.   HENT:   Head: Normocephalic and atraumatic.   Right Ear: External ear normal.   Left Ear: External ear normal.   Mouth/Throat: Oropharynx is clear and moist. No oropharyngeal exudate.   Eyes: Pupils are equal, round, and reactive to light. EOM are normal.   Neck: Normal range of motion. Neck supple. No thyromegaly present.   Cardiovascular: Normal rate, regular rhythm, normal heart sounds and intact distal pulses.   No murmur heard.  Pulmonary/Chest: Effort normal and breath sounds normal. No respiratory distress.   Abdominal: Soft. Bowel sounds are normal. She exhibits no distension.   Musculoskeletal: Normal range of motion.   Lymphadenopathy:     She has cervical adenopathy.   Neurological: She is alert and oriented to person, place, and time.   Skin: Skin is warm and dry. She is not diaphoretic.   Psychiatric: She has a normal mood and affect.   Nursing note and vitals reviewed.          Assessment:       1. Acute pharyngitis, unspecified etiology    2. Recurrent streptococcal tonsillitis    3. Encounter for screening for HIV    4. Exposure to body fluids by contaminated hypodermic needlestick        Wendy Munoz is a 40 y.o.female with:    1. Acute pharyngitis, unspecified etiology  - Strep A culture, throat  -Will check throat Culture.  Exam is unremarkable.   Will tx if Cx positive.     2. Recurrent streptococcal tonsillitis  - Ambulatory referral/consult to ENT; Future  -Refer to ENT.     3. Encounter for screening for HIV  - HIV 1/2 Ag/Ab (4th Gen); Future    4. 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       Chronic conditions status updated as per HPI.  Other than changes above, cont current medications and maintain follow up with specialists.  Return to clinic as needed.    Ofelia Arango MD  [facility]

## 2020-02-12 ENCOUNTER — TELEPHONE (OUTPATIENT)
Dept: PRIMARY CARE CLINIC | Facility: CLINIC | Age: 40
End: 2020-02-12

## 2020-02-12 NOTE — TELEPHONE ENCOUNTER
I spoke to pt. I had opted not to order the thrid test and have her see ENT first.   She awaits phone call to schedule labs.     Dr. LOVE

## 2020-02-12 NOTE — TELEPHONE ENCOUNTER
Pt stated on d/c yesterday that you were going to add a third lab test to be performed. I only see hepC AB and HIV orders

## 2020-02-13 ENCOUNTER — TELEPHONE (OUTPATIENT)
Dept: PRIMARY CARE CLINIC | Facility: CLINIC | Age: 40
End: 2020-02-13

## 2020-02-13 NOTE — TELEPHONE ENCOUNTER
----- Message from Ofelia Arango MD sent at 2/13/2020 12:57 PM CST -----  Please inform pt her Strep culture shows no growth preliminaryy.  Still await final    Dr. LOVE

## 2020-02-14 LAB — BACTERIA THROAT CULT: NORMAL

## 2020-02-17 ENCOUNTER — PATIENT MESSAGE (OUTPATIENT)
Dept: PRIMARY CARE CLINIC | Facility: CLINIC | Age: 40
End: 2020-02-17

## 2020-03-04 ENCOUNTER — PATIENT MESSAGE (OUTPATIENT)
Dept: PRIMARY CARE CLINIC | Facility: CLINIC | Age: 40
End: 2020-03-04

## 2020-03-11 ENCOUNTER — LAB VISIT (OUTPATIENT)
Dept: LAB | Facility: OTHER | Age: 40
End: 2020-03-11
Attending: INTERNAL MEDICINE
Payer: COMMERCIAL

## 2020-03-11 DIAGNOSIS — Z77.21 EXPOSURE TO BODY FLUIDS BY CONTAMINATED HYPODERMIC NEEDLESTICK: ICD-10-CM

## 2020-03-11 DIAGNOSIS — Z11.4 ENCOUNTER FOR SCREENING FOR HIV: ICD-10-CM
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
## 2020-03-11 PROCEDURE — 86803 HEPATITIS C AB TEST: CPT

## 2020-03-11 PROCEDURE — 86703 HIV-1/HIV-2 1 RESULT ANTBDY: CPT

## 2020-03-11 PROCEDURE — 36415 COLL VENOUS BLD VENIPUNCTURE: CPT

## 2020-03-12 LAB
HCV AB SERPL QL IA: NEGATIVE
HIV 1+2 AB+HIV1 P24 AG SERPL QL IA: NEGATIVE

## 2020-03-13 NOTE — PROGRESS NOTES
I sent pt a my chart message -  I reviewed your labs and your Hep C screen and HIV were negative.    Dr. LOVE

## 2020-03-26 ENCOUNTER — TELEPHONE (OUTPATIENT)
Dept: INTERNAL MEDICINE | Facility: CLINIC | Age: 40
End: 2020-03-26

## 2020-03-26 DIAGNOSIS — Z20.822 CLOSE EXPOSURE TO COVID-19 VIRUS: Primary | ICD-10-CM

## 2020-03-27 ENCOUNTER — CLINICAL SUPPORT (OUTPATIENT)
Dept: INTERNAL MEDICINE | Facility: CLINIC | Age: 40
End: 2020-03-27
Payer: COMMERCIAL

## 2020-03-27 DIAGNOSIS — Z20.822 CLOSE EXPOSURE TO COVID-19 VIRUS: ICD-10-CM

## 2020-03-27 PROCEDURE — 99999 PR PBB SHADOW E&M-EST. PATIENT-LVL I: CPT | Mod: PBBFAC,,,

## 2020-03-27 PROCEDURE — 99999 PR PBB SHADOW E&M-EST. PATIENT-LVL I: ICD-10-PCS | Mod: PBBFAC,,,

## 2020-03-27 PROCEDURE — U0002 COVID-19 LAB TEST NON-CDC: HCPCS

## 2020-03-29 LAB — SARS-COV-2 RNA RESP QL NAA+PROBE: NOT DETECTED

## 2020-03-30 DIAGNOSIS — R06.02 SHORTNESS OF BREATH: Primary | ICD-10-CM

## 2020-03-30 DIAGNOSIS — J45.990 EXERCISE-INDUCED ASTHMA: ICD-10-CM

## 2020-03-30 RX ORDER — ALBUTEROL SULFATE 90 UG/1
2 AEROSOL, METERED RESPIRATORY (INHALATION) EVERY 6 HOURS PRN
Qty: 18 G | Refills: 1 | Status: SHIPPED | OUTPATIENT
Start: 2020-03-30 | End: 2023-06-28

## 2020-03-30 NOTE — PROGRESS NOTES
I d/w pt her COVID -19 test was negative.  She is going to continue to monitor her Sx's.  She reports s/o SOB x 10 days.  She does have a h/o Exercise - Induced Asthma and has not needed an inhaler in several yrs.  Will send a Rx for Albuterol to see if this helps. 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 She will alert me for any concerning or worsening of Sx's.     Dr. LOVE

## 2020-04-21 DIAGNOSIS — Z01.84 ANTIBODY RESPONSE EXAMINATION: ICD-10-CM

## 2020-04-22 ENCOUNTER — LAB VISIT (OUTPATIENT)
Dept: LAB | Facility: OTHER | Age: 40
End: 2020-04-22
Attending: INTERNAL MEDICINE
Payer: COMMERCIAL

## 2020-04-22 DIAGNOSIS — Z01.84 ANTIBODY RESPONSE EXAMINATION: ICD-10-CM

## 2020-04-22 PROCEDURE — 36415 COLL VENOUS BLD VENIPUNCTURE: CPT

## 2020-04-22 PROCEDURE — 86769 SARS-COV-2 COVID-19 ANTIBODY: CPT

## 2020-04-23 LAB — SARS-COV-2 IGG SERPL QL IA: NEGATIVE

## 2020-07-29 ENCOUNTER — OFFICE VISIT (OUTPATIENT)
Dept: PRIMARY CARE CLINIC | Facility: CLINIC | Age: 40
End: 2020-07-29
Payer: COMMERCIAL

## 2020-07-29 ENCOUNTER — LAB VISIT (OUTPATIENT)
Dept: LAB | Facility: HOSPITAL | Age: 40
End: 2020-07-29
Attending: INTERNAL MEDICINE
Payer: COMMERCIAL

## 2020-07-29 VITALS
BODY MASS INDEX: 21.87 KG/M2 | RESPIRATION RATE: 18 BRPM | TEMPERATURE: 98 F | OXYGEN SATURATION: 99 % | HEART RATE: 82 BPM | HEIGHT: 69 IN | DIASTOLIC BLOOD PRESSURE: 62 MMHG | SYSTOLIC BLOOD PRESSURE: 105 MMHG | WEIGHT: 147.69 LBS

## 2020-07-29 DIAGNOSIS — F41.9 ANXIETY: ICD-10-CM

## 2020-07-29 DIAGNOSIS — R00.2 PALPITATIONS: ICD-10-CM

## 2020-07-29 DIAGNOSIS — R00.2 PALPITATIONS: Primary | ICD-10-CM

## 2020-07-29 LAB
T3FREE SERPL-MCNC: 3.1 PG/ML (ref 2.3–4.2)
T4 FREE SERPL-MCNC: 1.05 NG/DL (ref 0.71–1.51)
TSH SERPL DL<=0.005 MIU/L-ACNC: 0.97 UIU/ML (ref 0.4–4)

## 2020-07-29 PROCEDURE — 93010 EKG 12-LEAD: ICD-10-PCS | Mod: S$GLB,,, | Performed by: INTERNAL MEDICINE

## 2020-07-29 PROCEDURE — 99999 PR PBB SHADOW E&M-EST. PATIENT-LVL IV: ICD-10-PCS | Mod: PBBFAC,,, | Performed by: INTERNAL MEDICINE

## 2020-07-29 PROCEDURE — 99213 PR OFFICE/OUTPT VISIT, EST, LEVL III, 20-29 MIN: ICD-10-PCS | Mod: S$GLB,,, | Performed by: INTERNAL MEDICINE

## 2020-07-29 PROCEDURE — 84439 ASSAY OF FREE THYROXINE: CPT

## 2020-07-29 PROCEDURE — 36415 COLL VENOUS BLD VENIPUNCTURE: CPT | Mod: PN

## 2020-07-29 PROCEDURE — 84443 ASSAY THYROID STIM HORMONE: CPT

## 2020-07-29 PROCEDURE — 93005 EKG 12-LEAD: ICD-10-PCS | Mod: S$GLB,,, | Performed by: INTERNAL MEDICINE

## 2020-07-29 PROCEDURE — 93005 ELECTROCARDIOGRAM TRACING: CPT | Mod: S$GLB,,, | Performed by: INTERNAL MEDICINE

## 2020-07-29 PROCEDURE — 84481 FREE ASSAY (FT-3): CPT

## 2020-07-29 PROCEDURE — 99213 OFFICE O/P EST LOW 20 MIN: CPT | Mod: S$GLB,,, | Performed by: INTERNAL MEDICINE

## 2020-07-29 PROCEDURE — 99999 PR PBB SHADOW E&M-EST. PATIENT-LVL IV: CPT | Mod: PBBFAC,,, | Performed by: INTERNAL MEDICINE

## 2020-07-29 PROCEDURE — 3008F PR BODY MASS INDEX (BMI) DOCUMENTED: ICD-10-PCS | Mod: CPTII,S$GLB,, | Performed by: INTERNAL MEDICINE

## 2020-07-29 PROCEDURE — 3008F BODY MASS INDEX DOCD: CPT | Mod: CPTII,S$GLB,, | Performed by: INTERNAL MEDICINE

## 2020-07-29 PROCEDURE — 93010 ELECTROCARDIOGRAM REPORT: CPT | Mod: S$GLB,,, | Performed by: INTERNAL MEDICINE

## 2020-07-29 NOTE — PROGRESS NOTES
"[facility] Note    Chief Complaint      Chief Complaint   Patient presents with    Palpitations       History of Present Illness      Wendy Munoz is a 40 y.o.  WF with recurrent Strep pharyngitis presents with c/o Palpitations.     Palpitations - She has had 2 episodes of tachycardia.  First episode 3-4 wks ago that lasted 5 minutes while putting son in time-out.  Felt some pain in her neck and worried she dissected something in her neck and then like she couldn't breathe and heart started to race. She also felt tingling in Juan Ramon hands. The second episode was 3 nights ago. Lasted 2 minutes.  It occurred while lying in bed.  Older son couldn't sleep and came to sleep with her and her .  It annoyed her but did not stress her out.  She then felt like her HR went up and she couldn't breathe.  Her HR was 120 again. No numbness/tingling. "The neck pain had been there that day and I thought "I hope it's not a trigger". She thinks her Left neck pain may be related to her lying in bed with her 3 y.o. in his tiny bed trying to get him to sleep. "I also got chills and shaking both times". The neck pain is like a dull ache that radiates up to my jaw.     Recurrent Strep Pharyngitis - She reportedly has had strep throat 6 times in the past yr.  Per pt, all but one were dx with a + Rapid strep or + Throat Cx. Started in summer if 2018.  Had recurrence Dec. 2018 and then Feb. 2019. She then had Strep Throat Tx in Dec. 2019 with Amoxil. 3 days later her symptoms returned and was tx with Augmentin x 10 days 1/4/20. Prev referred to ENT.  She prev had appt but had to cancel it.  Consider A/I referral.   Pt denies any other frequent infections.      HCM - Flu - 10/3/19;  MGM - none- + fam h/o Breast Ca - mom at 49 yo; PCP - none          Past Medical History:  Past Medical History:   Diagnosis Date    Migraine        Past Surgical History:   has a past surgical history that includes Hysteroscopy w/ " "polypectomy; Hernia repair; and Removal of intrauterine device (IUD).    Family History:  family history includes Breast cancer (age of onset: 50) in her mother; Hypertension in her father; Prostate cancer in her father.     Social History:  Social History     Tobacco Use    Smoking status: Never Smoker    Smokeless tobacco: Never Used   Substance Use Topics    Alcohol use: Yes     Frequency: 2-3 times a week     Drinks per session: 1 or 2     Binge frequency: Never     Comment: few times a week    Drug use: Never       Review of Systems   Constitutional: Negative for chills and fever.   HENT: Negative for hearing loss.         No loss of smell/taste   Eyes: Negative for discharge.   Respiratory: Positive for shortness of breath. Negative for cough and wheezing.         Only with her 2 episodes of palpitations.    Cardiovascular: Positive for palpitations. Negative for chest pain.         is an ER trained palliative medicine physician and felt her Pulse and reported it was regular rhythm but tachycardic.   Gastrointestinal: Negative for blood in stool, constipation, diarrhea, nausea and vomiting.   Genitourinary: Negative for dysuria and hematuria.   Musculoskeletal: Positive for neck pain.   Neurological: Negative for dizziness, weakness and headaches.   Endo/Heme/Allergies: Negative for polydipsia.   Psychiatric/Behavioral: The patient is nervous/anxious.         "My kids are stressing me out. It's constant time outs.  My 4 y.o. is a handful.  To have the kids home and constatnly fighting for sure adds to my stress level".         Medications:  Outpatient Encounter Medications as of 7/29/2020   Medication Sig Dispense Refill    albuterol (PROAIR HFA) 90 mcg/actuation inhaler Inhale 2 puffs into the lungs every 6 (six) hours as needed for Wheezing. Rescue 18 g 1    fish oil-omega-3 fatty acids 300-1,000 mg capsule Take by mouth once daily.      magnesium 250 mg Tab Take by mouth once.      " "multivitamin (ONE DAILY MULTIVITAMIN) per tablet Take 1 tablet by mouth once daily.       No facility-administered encounter medications on file as of 7/29/2020.        Current Outpatient Medications:     albuterol (PROAIR HFA) 90 mcg/actuation inhaler, Inhale 2 puffs into the lungs every 6 (six) hours as needed for Wheezing. Rescue, Disp: 18 g, Rfl: 1    fish oil-omega-3 fatty acids 300-1,000 mg capsule, Take by mouth once daily., Disp: , Rfl:     magnesium 250 mg Tab, Take by mouth once., Disp: , Rfl:     multivitamin (ONE DAILY MULTIVITAMIN) per tablet, Take 1 tablet by mouth once daily., Disp: , Rfl:     Allergies:  Review of patient's allergies indicates:   Allergen Reactions    Latex, natural rubber      Rash/itching       Health Maintenance:    There is no immunization history on file for this patient.   Health Maintenance   Topic Date Due    TETANUS VACCINE  08/16/2026    Hepatitis C Screening  Completed    Lipid Panel  Completed      Objective:      Vital Signs  Temp: 98.1 °F (36.7 °C)  Pulse: 82  Resp: 18  SpO2: 99 %  BP: 105/62  BP Location: Left arm  Patient Position: Sitting  Pain Score: 0-No pain  Height and Weight  Height: 5' 9" (175.3 cm)  Weight: 67 kg (147 lb 11.3 oz)  BSA (Calculated - sq m): 1.81 sq meters  BMI (Calculated): 21.8  Weight in (lb) to have BMI = 25: 168.9]    Laboratory:  CBC:  Recent Labs   Lab 10/03/19  0859   WBC 7.35   RBC 4.56   Hemoglobin 13.9   Hematocrit 40.3   Platelets 292   Mean Corpuscular Volume 88   Mean Corpuscular Hemoglobin 30.5   Mean Corpuscular Hemoglobin Conc 34.5       CMP:  Recent Labs   Lab 10/03/19  0859   Glucose 96   Calcium 9.7   Albumin 4.5   Total Protein 8.4   Sodium 137   Potassium 3.9   CO2 25   Chloride 104   BUN, Bld 14   Creatinine 1.0   eGFR if African American >60.0   eGFR if non African American >60.0   Alkaline Phosphatase 62   ALT 16   AST 18   Total Bilirubin 0.5       LIPIDS:  Recent Labs   Lab 10/03/19  0859   HDL 69   Cholesterol " 158   Triglycerides 46   LDL Cholesterol 79.8   Hdl/Cholesterol Ratio 43.7   Non-HDL Cholesterol 89   Total Cholesterol/HDL Ratio 2.3       A1C:  Recent Labs   Lab 10/03/19  0859   Hemoglobin A1C 4.8       Lab Results   Component Value Date    HEPCAB Negative 03/11/2020       Physical Exam  Vitals signs reviewed.   Constitutional:       General: She is not in acute distress.     Appearance: Normal appearance. She is not ill-appearing, toxic-appearing or diaphoretic.   HENT:      Head: Normocephalic and atraumatic.      Right Ear: Tympanic membrane normal.      Left Ear: Tympanic membrane normal.   Eyes:      Extraocular Movements: Extraocular movements intact.      Conjunctiva/sclera: Conjunctivae normal.      Pupils: Pupils are equal, round, and reactive to light.   Neck:      Musculoskeletal: Normal range of motion and neck supple. No neck rigidity or muscular tenderness.      Vascular: No carotid bruit.   Cardiovascular:      Rate and Rhythm: Normal rate and regular rhythm.      Pulses: Normal pulses.      Heart sounds: Normal heart sounds.      Comments: HR -64 bpm  Pulmonary:      Effort: Pulmonary effort is normal. No respiratory distress.      Breath sounds: Normal breath sounds. No stridor. No wheezing or rhonchi.   Abdominal:      General: Bowel sounds are normal. There is no distension.      Palpations: Abdomen is soft.      Tenderness: There is no abdominal tenderness. There is no guarding or rebound.   Musculoskeletal:      Comments: FROm of neck;  NTTP   Lymphadenopathy:      Cervical: No cervical adenopathy.   Skin:     General: Skin is warm and dry.   Neurological:      General: No focal deficit present.      Mental Status: She is alert and oriented to person, place, and time.   Psychiatric:         Mood and Affect: Mood normal.         Behavior: Behavior normal.             Assessment:       1. Palpitations        Wendy Munoz is a 40 y.o.female with:    1. Palpitations  - IN OFFICE EKG  12-LEAD (to [facility])  - TSH; Future  - T3, free; Future  - T4, free; Future  -Suspect poss Panic Attacks.  Will cont to monitor and call for any further episodes.  Exam - wnl.  Will check EKG and TFT's.  If persists may need to consider Event Monitor.      2. Anxiety  - Pt not interested in further intervention at this time but will continue to monitor and alert me if this changes. We talked about taking time for herself  To destress.  She finds her  very supportive and helpful.    Chronic conditions status updated as per HPI.  Other than changes above, cont current medications and maintain follow up with specialists.  Return to clinic 3 mos for well visit or sooner if needed.    Ofelia Arango MD  [facility]                  Answers for HPI/ROS submitted by the patient on 7/28/2020   activity change: No  unexpected weight change: No  rhinorrhea: No  trouble swallowing: No  visual disturbance: No  chest tightness: No  polyuria: No  difficulty urinating: No  menstrual problem: No  joint swelling: No  arthralgias: No  confusion: No  dysphoric mood: No

## 2020-07-29 NOTE — PROGRESS NOTES
I sent pt a my chart message -  I reviewed your labs and all of your Thyroid functions were normal.    Dr. LOVE

## 2020-08-27 ENCOUNTER — CLINICAL SUPPORT (OUTPATIENT)
Dept: URGENT CARE | Facility: CLINIC | Age: 40
End: 2020-08-27
Payer: COMMERCIAL

## 2020-08-27 VITALS — HEART RATE: 99 BPM

## 2020-08-27 DIAGNOSIS — R09.89 RUNNY NOSE: ICD-10-CM

## 2020-08-27 DIAGNOSIS — R05.9 COUGH: Primary | ICD-10-CM

## 2020-08-27 DIAGNOSIS — R05.9 COUGH: ICD-10-CM

## 2020-08-27 LAB
CTP QC/QA: YES
SARS-COV-2 RDRP RESP QL NAA+PROBE: NEGATIVE

## 2020-08-27 PROCEDURE — U0002: ICD-10-PCS | Mod: S$GLB,,, | Performed by: INTERNAL MEDICINE

## 2020-08-27 PROCEDURE — U0002 COVID-19 LAB TEST NON-CDC: HCPCS | Mod: S$GLB,,, | Performed by: INTERNAL MEDICINE

## 2020-08-27 NOTE — PROGRESS NOTES
This test utilizes isothermal nucleic acid amplification   technology to detect the SARS-CoV-2 RdRp nucleic acid segment.   The analytical sensitivity (limit of detection) is 125 genome   equivalents/mL.   A POSITIVE result implies infection with the SARS-CoV-2 virus;   the patient is presumed to be contagious.     A NEGATIVE result means that SARS-CoV-2 nucleic acids are not   present above the limit of detection. A NEGATIVE result should be   treated as presumptive. It does not rule out the possibility of   COVID-19 and should not be the sole basis for treatment decisions.   If COVID-19 is strongly suspected based on clinical and exposure   history, re-testing using an alternate molecular assay should be   considered.   This test is only for use under the Food and Drug   Administration s Emergency Use Authorization (EUA).   Commercial kits are provided by [employer].   Performance characteristics of the EUA have been independently   verified by [facility] Department of   Pathology and Laboratory Medicine.   _________________________________________________________________   The authorized Fact Sheet for Healthcare Providers and the authorized Fact   Sheet for Patients of the ID NOW COVID-19 are available on the FDA   website:     https://www.fda.gov/media/888900/download  https://www.fda.gov/media/015550/download

## 2020-08-28 ENCOUNTER — TELEPHONE (OUTPATIENT)
Dept: PRIMARY CARE CLINIC | Facility: OTHER | Age: 40
End: 2020-08-28

## 2020-10-13 ENCOUNTER — OFFICE VISIT (OUTPATIENT)
Dept: OBSTETRICS AND GYNECOLOGY | Facility: CLINIC | Age: 40
End: 2020-10-13
Payer: COMMERCIAL

## 2020-10-13 VITALS
WEIGHT: 153 LBS | SYSTOLIC BLOOD PRESSURE: 120 MMHG | BODY MASS INDEX: 22.66 KG/M2 | DIASTOLIC BLOOD PRESSURE: 80 MMHG | HEIGHT: 69 IN

## 2020-10-13 DIAGNOSIS — Z12.31 ENCOUNTER FOR SCREENING MAMMOGRAM FOR BREAST CANCER: ICD-10-CM

## 2020-10-13 DIAGNOSIS — Z11.51 ENCOUNTER FOR SCREENING FOR HUMAN PAPILLOMAVIRUS (HPV): ICD-10-CM

## 2020-10-13 DIAGNOSIS — Z01.419 ENCOUNTER FOR ANNUAL ROUTINE GYNECOLOGICAL EXAMINATION: Primary | ICD-10-CM

## 2020-10-13 DIAGNOSIS — Z12.4 PAP SMEAR FOR CERVICAL CANCER SCREENING: ICD-10-CM

## 2020-10-13 PROCEDURE — 99395 PR PREVENTIVE VISIT,EST,18-39: ICD-10-PCS | Mod: S$GLB,,, | Performed by: OBSTETRICS & GYNECOLOGY

## 2020-10-13 PROCEDURE — 87624 HPV HI-RISK TYP POOLED RSLT: CPT

## 2020-10-13 PROCEDURE — 99999 PR PBB SHADOW E&M-EST. PATIENT-LVL III: ICD-10-PCS | Mod: PBBFAC,,, | Performed by: OBSTETRICS & GYNECOLOGY

## 2020-10-13 PROCEDURE — 99395 PREV VISIT EST AGE 18-39: CPT | Mod: S$GLB,,, | Performed by: OBSTETRICS & GYNECOLOGY

## 2020-10-13 PROCEDURE — 88175 CYTOPATH C/V AUTO FLUID REDO: CPT

## 2020-10-13 PROCEDURE — 99999 PR PBB SHADOW E&M-EST. PATIENT-LVL III: CPT | Mod: PBBFAC,,, | Performed by: OBSTETRICS & GYNECOLOGY

## 2020-10-13 NOTE — PROGRESS NOTES
"Chief Complaint: Well Woman Exam   Patient known to me.  HPI:      Wendy Munoz is a 40 y.o.  who presents today for well woman exam.  LMP: Patient's last menstrual period was 2020 (approximate).  No issues, problems, or complaints. Specifically, patient denies abnormal vaginal bleeding, discharge, pelvic pain, urinary problems, or changes in appetite. Ms. Munoz is currently sexually active with a single male partner. She is currently using partner's vasectomy for contraception. She declines STD screening today.    Previous Pap:  no abnormalities (No result found) Last done 1-2 years ago at     [facility]:has never had , counseled and desired    OB History        4    Para   3    Term   3            AB   1    Living   3       SAB   1    TAB        Ectopic        Multiple        Live Births   3           Obstetric Comments   Menarche at 13  H/O abnormal pap ()- colpo normal  No other STDs             ROS:     GENERAL: Denies unintentional weight gain or weight loss. Feeling well overall.   SKIN: Denies rash or lesions.   HEENT: Denies headaches, or vision changes.   CARDIOVASCULAR: Denies palpitations or chest pain.   RESPIRATORY: Denies shortness of breath or dyspnea on exertion.  BREASTS: Denies pain, lumps, or nipple discharge.   ABDOMEN: Denies abdominal pain, constipation, diarrhea, nausea, vomiting, change in appetite.  URINARY: Denies frequency, dysuria, hematuria.  NEUROLOGIC: Denies syncope or weakness.   PSYCHIATRIC: Denies depression, anxiety or mood swings.    Physical Exam:      PHYSICAL EXAM:  /80   Ht 5' 9" (1.753 m)   Wt 69.4 kg (153 lb)   LMP 2020 (Approximate)   BMI 22.59 kg/m²   Body mass index is 22.59 kg/m².     APPEARANCE: Well nourished, well developed, in no acute distress.  PSYCH: Appropriate mood and affect.  SKIN: No acne or hirsutism  NECK: Neck symmetric without masses or thyromegaly  NODES: No inguinal, axillary, or supraclavicular " lymph node enlargement  ABDOMEN: Soft.  No tenderness or masses.    CARDIOVASCULAR: No edema of peripheral extremities  BREASTS: Symmetrical, no skin changes or visible lesions.  No palpable masses or nipple discharge bilaterally.  PELVIC: Normal external genitalia without lesions.  Normal hair distribution.  Adequate perineal body, normal urethral meatus.  Vagina moist and well rugated without lesions or discharge.  Cervix pink, without lesions, discharge or tenderness.  No significant cystocele or rectocele.  Bimanual exam shows uterus to be normal size (8cm), regular, mobile and nontender.  Adnexa without masses or tenderness.      Assessment/Plan:     Encounter for annual routine gynecological examination  Normal exam today. Pap with HPV done. Mammogram ordered for 1/2021. Counseled on Gardasil and Rx given. Other health maintenance up to date per PCP.    Pap smear for cervical cancer screening  -     Liquid-Based Pap Smear, Screening    Encounter for screening for human papillomavirus (HPV)  -     HPV High Risk Genotypes, PCR    Encounter for screening mammogram for breast cancer  -     Mammo Digital Screening Bilat w/ Chidi; Future; Expected date: 10/13/2020    RTC 1 year    Counseling:     Patient was counseled today on current ASCCP pap guidelines, the recommendation for yearly pelvic exams, healthy diet and exercise routines, breast self awareness and annual mammograms.She is to see her PCP for other health maintenance.     Use of the [facility] Patient Portal discussed and encouraged during today's visit.       Parul Oshea MD

## 2020-10-14 ENCOUNTER — IMMUNIZATION (OUTPATIENT)
Dept: PHARMACY | Facility: CLINIC | Age: 40
End: 2020-10-14
Payer: COMMERCIAL

## 2020-10-22 LAB
HPV HR 12 DNA SPEC QL NAA+PROBE: NEGATIVE
HPV16 AG SPEC QL: NEGATIVE
HPV18 DNA SPEC QL NAA+PROBE: NEGATIVE

## 2020-11-06 ENCOUNTER — OFFICE VISIT (OUTPATIENT)
Dept: DERMATOLOGY | Facility: CLINIC | Age: 40
End: 2020-11-06
Payer: COMMERCIAL

## 2020-11-06 DIAGNOSIS — L82.1 SK (SEBORRHEIC KERATOSIS): ICD-10-CM

## 2020-11-06 DIAGNOSIS — Z85.828 HISTORY OF NONMELANOMA SKIN CANCER: ICD-10-CM

## 2020-11-06 DIAGNOSIS — Z12.83 SKIN CANCER SCREENING: ICD-10-CM

## 2020-11-06 DIAGNOSIS — D48.5 NEOPLASM OF UNCERTAIN BEHAVIOR OF SKIN: ICD-10-CM

## 2020-11-06 DIAGNOSIS — D22.9 MULTIPLE BENIGN NEVI: Primary | ICD-10-CM

## 2020-11-06 PROCEDURE — 11102 PR TANGENTIAL BIOPSY, SKIN, SINGLE LESION: ICD-10-PCS | Mod: S$GLB,,, | Performed by: DERMATOLOGY

## 2020-11-06 PROCEDURE — 99999 PR PBB SHADOW E&M-EST. PATIENT-LVL III: ICD-10-PCS | Mod: PBBFAC,,, | Performed by: DERMATOLOGY

## 2020-11-06 PROCEDURE — 88305 TISSUE EXAM BY PATHOLOGIST: ICD-10-PCS | Mod: 26,,, | Performed by: DERMATOLOGY

## 2020-11-06 PROCEDURE — 99214 OFFICE O/P EST MOD 30 MIN: CPT | Mod: 25,S$GLB,, | Performed by: DERMATOLOGY

## 2020-11-06 PROCEDURE — 88305 TISSUE EXAM BY PATHOLOGIST: CPT | Mod: 26,,, | Performed by: DERMATOLOGY

## 2020-11-06 PROCEDURE — 99999 PR PBB SHADOW E&M-EST. PATIENT-LVL III: CPT | Mod: PBBFAC,,, | Performed by: DERMATOLOGY

## 2020-11-06 PROCEDURE — 88305 TISSUE EXAM BY PATHOLOGIST: CPT | Performed by: DERMATOLOGY

## 2020-11-06 PROCEDURE — 11102 TANGNTL BX SKIN SINGLE LES: CPT | Mod: S$GLB,,, | Performed by: DERMATOLOGY

## 2020-11-06 PROCEDURE — 99214 PR OFFICE/OUTPT VISIT, EST, LEVL IV, 30-39 MIN: ICD-10-PCS | Mod: 25,S$GLB,, | Performed by: DERMATOLOGY

## 2020-11-06 NOTE — PATIENT INSTRUCTIONS
Summer Sun Protection      The [facility] would like to remind you of the importance of sun protection all year round and particularly during the summer when the suns rays are the strongest. It has been proven that both acute and chronic sun exposure damages our cells and leads to skin cancer. Beyond skin cancer, the sun causes 90% of the symptoms of pre-mature skin aging, including wrinkles, lentigines (brown spots), and thin, easily bruised skin. Proper sun protection can help prevent these unwanted conditions.    Many patients report that the dont go in the sun. It has been shown that the average person receives 18 hours of incidental sun exposure per week during activities such as walking through parking lots, driving, or sitting next to windows. This accumulates to several bad sunburns per year!    In choosing sunscreen, you want one that protects against both UVA and UVB rays. It is recommended that you use one of SPF 30 or higher. It is important to apply the sunscreen about 20 minutes prior to sun exposure. Most sunscreens are chemical sunscreens and a reaction must take place in the skin so that they are effective. If they are applied and then you are immediately exposed to the sun or start sweating, this reaction has not had time to take place and you are therefore unprotected. Sunscreen needs to be reapplied every 2 hours if you are participating in water sports or sweating. We recommend Elta MD or Neutrogena Ultra Sheer Dry Touch SPF 55 for daily use; however there are many options and it is most important for you to find one that you will use on a consistent basis.    If you have sensitive skin, you may do best with a sunscreen that contains only physical blockers such as titanium dioxide or zinc oxide. These are typically thicker and harder to apply, however they afford very good protection. Neutrogena Sensitive Skin, Blue Lizard Sensitive Skin (pink top) or Neutrogena Pure  and Free are popular ones.     Aside from sunscreen, clothes with UV protection, wide brimmed hats, and sunglasses are other means of sun protection that we recommend.                        [facility] [address]  [facility] [address]  Dept: [phone]  Dept Fax: [phone]                                                                                Shave Biopsy Wound Care    Your doctor has performed a shave biopsy today.  A band aid and vaseline ointment has been placed over the site.  This should remain in place for 24 hours.  It is recommended that you keep the area dry for the first 24 hours.  After 24 hours, you may remove the band aid and wash the area with warm soap and water and apply Vaseline jelly.  Many patients prefer to use Neosporin or Bacitracin ointment.  This is acceptable; however, know that you can develop an allergy to this medication even if you have used it safely for years.  It is important to keep the area moist.  Letting it dry out and get air slows healing time, and will worsen the scar.  Band aid is optional after first 24 hours.      If you notice increasing redness, tenderness, pain, or yellow drainage at the biopsy site, please notify your doctor.  These are signs of an infection.    If your biopsy site is bleeding, apply firm pressure for 15 minutes straight.  Repeat for another 15 minutes, if it is still bleeding.   If the surgical site continues to bleed, then please contact your doctor.      For [facility] users:   You will receive your biopsy results in [email or web address] as soon as they are available. Please be assured that your physician/provider will review your results and will then determine what further treatment, evaluation, or planning is required. You should be contacted by your physician's/provider's office within 5 business days of receiving your results; If not, please reach out to directly. This is one more  way [facility] is putting you first.     [address] [place] [address]/ [phone] FAX/ [email or web address]

## 2020-11-06 NOTE — PROGRESS NOTES
Subjective:       Patient ID:  Wendy Munoz is a 40 y.o. female who presents for   Chief Complaint   Patient presents with    Skin Check     tbse      HPI   Patient has a history of non-melanoma skin cancer and is here today for routine skin check.   Pt c/o a mole on L abdomen, raised, easily irritated from bra, would like it removed.    Derm hx:  BCC R clavicle 3/2018    Review of Systems   Constitutional: Negative for fever, chills, weight loss, weight gain, fatigue, night sweats and malaise.   Skin: Positive for activity-related sunscreen use. Negative for itching and rash.   Hematologic/Lymphatic: Does not bruise/bleed easily.        Objective:    Physical Exam   Constitutional: She appears well-developed and well-nourished. No distress.   Genitourinary:               Neurological: She is alert and oriented to person, place, and time. She is not disoriented.   Psychiatric: She has a normal mood and affect.   Skin:   Areas Examined (abnormalities noted in diagram):   Scalp / Hair Palpated and Inspected  Head / Face Inspection Performed  Neck Inspection Performed  Chest / Axilla Inspection Performed  Abdomen Inspection Performed  Genitals / Buttocks / Groin Inspection Performed  Back Inspection Performed  RUE Inspected  LUE Inspection Performed  RLE Inspected  LLE Inspection Performed  Nails and Digits Inspection Performed              Diagram Legend     Erythematous scaling macule/papule c/w actinic keratosis       Vascular papule c/w angioma      Pigmented verrucoid papule/plaque c/w seborrheic keratosis      Yellow umbilicated papule c/w sebaceous hyperplasia      Irregularly shaped tan macule c/w lentigo     1-2 mm smooth white papules consistent with Milia      Movable subcutaneous cyst with punctum c/w epidermal inclusion cyst      Subcutaneous movable cyst c/w pilar cyst      Firm pink to brown papule c/w dermatofibroma      Pedunculated fleshy papule(s) c/w skin tag(s)      Evenly pigmented  macule c/w junctional nevus     Mildly variegated pigmented, slightly irregular-bordered macule c/w mildly atypical nevus      Flesh colored to evenly pigmented papule c/w intradermal nevus       Pink pearly papule/plaque c/w basal cell carcinoma      Erythematous hyperkeratotic cursted plaque c/w SCC      Surgical scar with no sign of skin cancer recurrence      Open and closed comedones      Inflammatory papules and pustules      Verrucoid papule consistent consistent with wart     Erythematous eczematous patches and plaques     Dystrophic onycholytic nail with subungual debris c/w onychomycosis     Umbilicated papule    Erythematous-base heme-crusted tan verrucoid plaque consistent with inflamed seborrheic keratosis     Erythematous Silvery Scaling Plaque c/w Psoriasis     See annotation      Assessment / Plan:      Neoplasm of uncertain behavior of skin  Shave biopsy procedure note:    Shave biopsy performed after verbal consent including risk of infection, scar, recurrence, need for additional treatment of site. Area prepped with alcohol, anesthetized with approximately 1.0cc of 1% lidocaine with epinephrine. Lesional tissue shaved with razor blade. Hemostasis achieved with application of aluminum chloride followed by hyfrecation. No complications. Dressing applied. Wound care explained.    -     Specimen to Pathology, Dermatology  Pathology Orders:     Normal Orders This Visit    Specimen to Pathology, Dermatology     Comments:    Number of Specimens:->1  ------------------------->-------------------------  Spec 1 Procedure:->Biopsy  Spec 1 Clinical Impression:->r/o inflamed nevus vs ST vs  other  Spec 1 Source:->L upper abdomen    Questions:    Procedure Type: Dermatology and skin neoplasms    Number of Specimens: 1    ------------------------: -------------------------    Spec 1 Procedure: Biopsy    Spec 1 Clinical Impression: r/o inflamed nevus vs ST vs other    Spec 1 Source: L upper abdomen         Multiple benign nevi  Benign-appearing on exam today. Counseled pt to monitor mole(s) and return to clinic if any changes noted or symptoms (bleeding, itching, pain, etc) noted. Brochure provided.    SK (seborrheic keratosis)  These are benign inherited growths without a malignant potential. Reassurance given to patient. No treatment is necessary.   Treatment of benign, asymptomatic lesions may be considered cosmetic.  Warned about risk of hypo- or hyperpigmentation with treatment and risk of recurrence.    Skin cancer screening  History of nonmelanoma skin cancer  Total body skin examination performed today including at least 12 points as noted in physical examination. No lesions suspicious for malignancy noted.  Patient instructed in importance of daily broad spectrum sunscreen use with spf at least 30. Sun avoidance and topical protection/protective clothing discussed.      Follow up in about 1 year (around 11/6/2021) for skin check or sooner for any concerns.

## 2020-11-09 LAB
FINAL PATHOLOGIC DIAGNOSIS: NORMAL
Lab: NORMAL

## 2020-11-11 LAB
FINAL PATHOLOGIC DIAGNOSIS: NORMAL
GROSS: NORMAL
MICROSCOPIC EXAM: NORMAL

## 2020-11-22 ENCOUNTER — PATIENT MESSAGE (OUTPATIENT)
Dept: OBSTETRICS AND GYNECOLOGY | Facility: CLINIC | Age: 40
End: 2020-11-22

## 2020-11-23 ENCOUNTER — PATIENT MESSAGE (OUTPATIENT)
Dept: OBSTETRICS AND GYNECOLOGY | Facility: CLINIC | Age: 40
End: 2020-11-23

## 2020-12-14 ENCOUNTER — CLINICAL SUPPORT (OUTPATIENT)
Dept: OBSTETRICS AND GYNECOLOGY | Facility: CLINIC | Age: 40
End: 2020-12-14
Payer: COMMERCIAL

## 2020-12-14 DIAGNOSIS — Z23 NEED FOR HPV VACCINATION: Primary | ICD-10-CM

## 2020-12-14 PROCEDURE — 90651 9VHPV VACCINE 2/3 DOSE IM: CPT | Mod: S$GLB,,, | Performed by: OBSTETRICS & GYNECOLOGY

## 2020-12-14 PROCEDURE — 99999 PR PBB SHADOW E&M-EST. PATIENT-LVL II: CPT | Mod: PBBFAC,,,

## 2020-12-14 PROCEDURE — 90471 HPV VACCINE 9-VALENT 3 DOSE IM: ICD-10-PCS | Mod: S$GLB,,, | Performed by: OBSTETRICS & GYNECOLOGY

## 2020-12-14 PROCEDURE — 99999 PR PBB SHADOW E&M-EST. PATIENT-LVL II: ICD-10-PCS | Mod: PBBFAC,,,

## 2020-12-14 PROCEDURE — 90471 IMMUNIZATION ADMIN: CPT | Mod: S$GLB,,, | Performed by: OBSTETRICS & GYNECOLOGY

## 2020-12-14 PROCEDURE — 90651 HPV VACCINE 9-VALENT 3 DOSE IM: ICD-10-PCS | Mod: S$GLB,,, | Performed by: OBSTETRICS & GYNECOLOGY

## 2020-12-14 NOTE — PROGRESS NOTES
Ordering Provider: Dr. Oshea     Patient here to receive Gardasil #2 to right deltoid. Tolerated well, no reaction noted. Instructed to wait 15 minutes after administration for monitoring.      Pre pain scale: none     Post pain scale: none

## 2020-12-15 ENCOUNTER — PATIENT MESSAGE (OUTPATIENT)
Dept: OBSTETRICS AND GYNECOLOGY | Facility: CLINIC | Age: 40
End: 2020-12-15

## 2020-12-16 ENCOUNTER — IMMUNIZATION (OUTPATIENT)
Dept: INTERNAL MEDICINE | Facility: CLINIC | Age: 40
End: 2020-12-16
Payer: COMMERCIAL

## 2020-12-16 DIAGNOSIS — Z23 NEED FOR VACCINATION: ICD-10-CM

## 2020-12-16 PROCEDURE — 0001A COVID-19, MRNA, LNP-S, PF, 30 MCG/0.3 ML DOSE VACCINE: ICD-10-PCS | Mod: CV19,,, | Performed by: INTERNAL MEDICINE

## 2020-12-16 PROCEDURE — 0001A COVID-19, MRNA, LNP-S, PF, 30 MCG/0.3 ML DOSE VACCINE: CPT | Mod: CV19,,, | Performed by: INTERNAL MEDICINE

## 2020-12-16 PROCEDURE — 91300 COVID-19, MRNA, LNP-S, PF, 30 MCG/0.3 ML DOSE VACCINE: ICD-10-PCS | Mod: ,,, | Performed by: INTERNAL MEDICINE

## 2020-12-16 PROCEDURE — 91300 COVID-19, MRNA, LNP-S, PF, 30 MCG/0.3 ML DOSE VACCINE: CPT | Mod: ,,, | Performed by: INTERNAL MEDICINE

## 2021-01-04 ENCOUNTER — PATIENT MESSAGE (OUTPATIENT)
Dept: ADMINISTRATIVE | Facility: HOSPITAL | Age: 41
End: 2021-01-04

## 2021-01-05 ENCOUNTER — HOSPITAL ENCOUNTER (OUTPATIENT)
Dept: RADIOLOGY | Facility: HOSPITAL | Age: 41
Discharge: HOME | End: 2021-01-05
Attending: OBSTETRICS & GYNECOLOGY
Payer: COMMERCIAL

## 2021-01-05 DIAGNOSIS — Z12.31 ENCOUNTER FOR SCREENING MAMMOGRAM FOR BREAST CANCER: ICD-10-CM

## 2021-01-05 PROCEDURE — 77063 BREAST TOMOSYNTHESIS BI: CPT | Mod: 26,,, | Performed by: RADIOLOGY

## 2021-01-05 PROCEDURE — 77063 MAMMO DIGITAL SCREENING BILAT WITH TOMO: ICD-10-PCS | Mod: 26,,, | Performed by: RADIOLOGY

## 2021-01-05 PROCEDURE — 77067 SCR MAMMO BI INCL CAD: CPT | Mod: 26,,, | Performed by: RADIOLOGY

## 2021-01-05 PROCEDURE — 77067 SCR MAMMO BI INCL CAD: CPT | Mod: TC,PN

## 2021-01-05 PROCEDURE — 77067 MAMMO DIGITAL SCREENING BILAT WITH TOMO: ICD-10-PCS | Mod: 26,,, | Performed by: RADIOLOGY

## 2021-01-06 ENCOUNTER — IMMUNIZATION (OUTPATIENT)
Dept: INTERNAL MEDICINE | Facility: CLINIC | Age: 41
End: 2021-01-06
Payer: COMMERCIAL

## 2021-01-06 DIAGNOSIS — Z23 NEED FOR VACCINATION: ICD-10-CM

## 2021-01-06 PROCEDURE — 91300 COVID-19, MRNA, LNP-S, PF, 30 MCG/0.3 ML DOSE VACCINE: CPT | Mod: PBBFAC | Performed by: INTERNAL MEDICINE

## 2021-01-06 PROCEDURE — 0002A COVID-19, MRNA, LNP-S, PF, 30 MCG/0.3 ML DOSE VACCINE: CPT | Mod: PBBFAC | Performed by: INTERNAL MEDICINE

## 2021-01-28 ENCOUNTER — PATIENT MESSAGE (OUTPATIENT)
Dept: OBSTETRICS AND GYNECOLOGY | Facility: CLINIC | Age: 41
End: 2021-01-28

## 2021-02-01 ENCOUNTER — TELEPHONE (OUTPATIENT)
Dept: SURGERY | Facility: CLINIC | Age: 41
End: 2021-02-01

## 2021-02-22 ENCOUNTER — OFFICE VISIT (OUTPATIENT)
Dept: HEMATOLOGY/ONCOLOGY | Facility: CLINIC | Age: 41
End: 2021-02-22
Payer: COMMERCIAL

## 2021-02-22 VITALS
TEMPERATURE: 98 F | RESPIRATION RATE: 16 BRPM | HEART RATE: 70 BPM | HEIGHT: 69 IN | BODY MASS INDEX: 22.87 KG/M2 | OXYGEN SATURATION: 99 % | WEIGHT: 154.44 LBS | SYSTOLIC BLOOD PRESSURE: 118 MMHG | DIASTOLIC BLOOD PRESSURE: 63 MMHG

## 2021-02-22 DIAGNOSIS — R92.30 DENSE BREAST TISSUE ON MAMMOGRAM: ICD-10-CM

## 2021-02-22 DIAGNOSIS — Z80.3 FAMILY HISTORY OF BREAST CANCER: ICD-10-CM

## 2021-02-22 DIAGNOSIS — Z91.89 AT HIGH RISK FOR BREAST CANCER: Primary | ICD-10-CM

## 2021-02-22 PROCEDURE — 1126F AMNT PAIN NOTED NONE PRSNT: CPT | Mod: S$GLB,,, | Performed by: NURSE PRACTITIONER

## 2021-02-22 PROCEDURE — 3008F PR BODY MASS INDEX (BMI) DOCUMENTED: ICD-10-PCS | Mod: CPTII,S$GLB,, | Performed by: NURSE PRACTITIONER

## 2021-02-22 PROCEDURE — 99999 PR PBB SHADOW E&M-EST. PATIENT-LVL IV: CPT | Mod: PBBFAC,,, | Performed by: NURSE PRACTITIONER

## 2021-02-22 PROCEDURE — 99205 OFFICE O/P NEW HI 60 MIN: CPT | Mod: S$GLB,,, | Performed by: NURSE PRACTITIONER

## 2021-02-22 PROCEDURE — 3008F BODY MASS INDEX DOCD: CPT | Mod: CPTII,S$GLB,, | Performed by: NURSE PRACTITIONER

## 2021-02-22 PROCEDURE — 99999 PR PBB SHADOW E&M-EST. PATIENT-LVL IV: ICD-10-PCS | Mod: PBBFAC,,, | Performed by: NURSE PRACTITIONER

## 2021-02-22 PROCEDURE — 99205 PR OFFICE/OUTPT VISIT, NEW, LEVL V, 60-74 MIN: ICD-10-PCS | Mod: S$GLB,,, | Performed by: NURSE PRACTITIONER

## 2021-02-22 PROCEDURE — 1126F PR PAIN SEVERITY QUANTIFIED, NO PAIN PRESENT: ICD-10-PCS | Mod: S$GLB,,, | Performed by: NURSE PRACTITIONER

## 2021-02-26 ENCOUNTER — TELEPHONE (OUTPATIENT)
Dept: HEMATOLOGY/ONCOLOGY | Facility: CLINIC | Age: 41
End: 2021-02-26

## 2021-02-26 NOTE — TELEPHONE ENCOUNTER
----- Message from Taz Mcgill DNP sent at 2/24/2021  5:01 PM CST -----  Lisa,    Please see note from PJ below and contact pt.    Thank you,  Taz    ----- Message -----  From: Tru Russo NP  Sent: 2/22/2021   4:38 PM CST  To: CHAZ Bull NP  for Genetic counseling for mother.  Please call patient for mother's information.

## 2021-03-03 ENCOUNTER — TELEPHONE (OUTPATIENT)
Dept: HEMATOLOGY/ONCOLOGY | Facility: CLINIC | Age: 41
End: 2021-03-03

## 2021-03-03 NOTE — TELEPHONE ENCOUNTER
----- Message from Taz Mcgill DNP sent at 2/26/2021  3:57 PM CST -----  Regarding: FW: rt a missed call    ----- Message -----  From: Ariana Álvarez  Sent: 2/26/2021   3:52 PM CST  To: Artem Mcghee Staff  Subject: rt a missed call                                  Type:  Patient Returning Call    Who Called: LINDA WATT [[id number]]    Who Left Message for Patient:Lisa    Does the patient know what this is regarding? yes    Best Call Back Number: [phone]    Additional Information:

## 2021-03-04 ENCOUNTER — PATIENT MESSAGE (OUTPATIENT)
Dept: HEMATOLOGY/ONCOLOGY | Facility: CLINIC | Age: 41
End: 2021-03-04

## 2021-04-06 ENCOUNTER — PATIENT MESSAGE (OUTPATIENT)
Dept: OBSTETRICS AND GYNECOLOGY | Facility: CLINIC | Age: 41
End: 2021-04-06

## 2021-04-13 ENCOUNTER — CLINICAL SUPPORT (OUTPATIENT)
Dept: OBSTETRICS AND GYNECOLOGY | Facility: CLINIC | Age: 41
End: 2021-04-13
Payer: COMMERCIAL

## 2021-04-13 DIAGNOSIS — Z23 NEED FOR HPV VACCINATION: Primary | ICD-10-CM

## 2021-04-13 PROCEDURE — 90651 9VHPV VACCINE 2/3 DOSE IM: CPT | Mod: S$GLB,,, | Performed by: OBSTETRICS & GYNECOLOGY

## 2021-04-13 PROCEDURE — 99999 PR PBB SHADOW E&M-EST. PATIENT-LVL I: CPT | Mod: PBBFAC,,,

## 2021-04-13 PROCEDURE — 90471 IMMUNIZATION ADMIN: CPT | Mod: S$GLB,,, | Performed by: OBSTETRICS & GYNECOLOGY

## 2021-04-13 PROCEDURE — 90471 HPV VACCINE 9-VALENT 3 DOSE IM: ICD-10-PCS | Mod: S$GLB,,, | Performed by: OBSTETRICS & GYNECOLOGY

## 2021-04-13 PROCEDURE — 90651 HPV VACCINE 9-VALENT 3 DOSE IM: ICD-10-PCS | Mod: S$GLB,,, | Performed by: OBSTETRICS & GYNECOLOGY

## 2021-04-13 PROCEDURE — 99999 PR PBB SHADOW E&M-EST. PATIENT-LVL I: ICD-10-PCS | Mod: PBBFAC,,,

## 2021-07-12 ENCOUNTER — HOSPITAL ENCOUNTER (OUTPATIENT)
Dept: RADIOLOGY | Facility: HOSPITAL | Age: 41
Discharge: HOME | End: 2021-07-12
Attending: NURSE PRACTITIONER
Payer: COMMERCIAL

## 2021-07-12 ENCOUNTER — OFFICE VISIT (OUTPATIENT)
Dept: HEMATOLOGY/ONCOLOGY | Facility: CLINIC | Age: 41
End: 2021-07-12
Payer: COMMERCIAL

## 2021-07-12 VITALS
TEMPERATURE: 99 F | WEIGHT: 154.13 LBS | OXYGEN SATURATION: 100 % | BODY MASS INDEX: 22.83 KG/M2 | RESPIRATION RATE: 16 BRPM | HEART RATE: 64 BPM | SYSTOLIC BLOOD PRESSURE: 111 MMHG | HEIGHT: 69 IN | DIASTOLIC BLOOD PRESSURE: 66 MMHG

## 2021-07-12 DIAGNOSIS — Z91.89 AT HIGH RISK FOR BREAST CANCER: ICD-10-CM

## 2021-07-12 DIAGNOSIS — R92.30 DENSE BREAST TISSUE ON MAMMOGRAM: ICD-10-CM

## 2021-07-12 DIAGNOSIS — Z80.3 FAMILY HISTORY OF BREAST CANCER: ICD-10-CM

## 2021-07-12 DIAGNOSIS — Z91.89 INCREASED RISK OF BREAST CANCER: Primary | ICD-10-CM

## 2021-07-12 PROCEDURE — 3008F PR BODY MASS INDEX (BMI) DOCUMENTED: ICD-10-PCS | Mod: CPTII,S$GLB,, | Performed by: NURSE PRACTITIONER

## 2021-07-12 PROCEDURE — 99999 PR PBB SHADOW E&M-EST. PATIENT-LVL IV: CPT | Mod: PBBFAC,,, | Performed by: NURSE PRACTITIONER

## 2021-07-12 PROCEDURE — 99999 PR PBB SHADOW E&M-EST. PATIENT-LVL IV: ICD-10-PCS | Mod: PBBFAC,,, | Performed by: NURSE PRACTITIONER

## 2021-07-12 PROCEDURE — 77049 MRI BREAST C-+ W/CAD BI: CPT | Mod: TC

## 2021-07-12 PROCEDURE — 77049 MRI BREAST W/WO CONTRAST, W/CAD, BILATERAL: ICD-10-PCS | Mod: 26,,, | Performed by: RADIOLOGY

## 2021-07-12 PROCEDURE — A9577 INJ MULTIHANCE: HCPCS | Performed by: NURSE PRACTITIONER

## 2021-07-12 PROCEDURE — 3008F BODY MASS INDEX DOCD: CPT | Mod: CPTII,S$GLB,, | Performed by: NURSE PRACTITIONER

## 2021-07-12 PROCEDURE — 1126F PR PAIN SEVERITY QUANTIFIED, NO PAIN PRESENT: ICD-10-PCS | Mod: S$GLB,,, | Performed by: NURSE PRACTITIONER

## 2021-07-12 PROCEDURE — 1126F AMNT PAIN NOTED NONE PRSNT: CPT | Mod: S$GLB,,, | Performed by: NURSE PRACTITIONER

## 2021-07-12 PROCEDURE — 99214 OFFICE O/P EST MOD 30 MIN: CPT | Mod: S$GLB,,, | Performed by: NURSE PRACTITIONER

## 2021-07-12 PROCEDURE — 25500020 PHARM REV CODE 255: Performed by: NURSE PRACTITIONER

## 2021-07-12 PROCEDURE — 77049 MRI BREAST C-+ W/CAD BI: CPT | Mod: 26,,, | Performed by: RADIOLOGY

## 2021-07-12 PROCEDURE — 99214 PR OFFICE/OUTPT VISIT, EST, LEVL IV, 30-39 MIN: ICD-10-PCS | Mod: S$GLB,,, | Performed by: NURSE PRACTITIONER

## 2021-07-12 RX ADMIN — GADOBENATE DIMEGLUMINE 15 ML: 529 INJECTION, SOLUTION INTRAVENOUS at 10:07

## 2021-08-14 ENCOUNTER — LAB VISIT (OUTPATIENT)
Dept: PRIMARY CARE CLINIC | Facility: OTHER | Age: 41
End: 2021-08-14
Payer: COMMERCIAL

## 2021-08-14 DIAGNOSIS — U07.1 COVID-19: Primary | ICD-10-CM

## 2021-08-14 PROCEDURE — U0003 INFECTIOUS AGENT DETECTION BY NUCLEIC ACID (DNA OR RNA); SEVERE ACUTE RESPIRATORY SYNDROME CORONAVIRUS 2 (SARS-COV-2) (CORONAVIRUS DISEASE [COVID-19]), AMPLIFIED PROBE TECHNIQUE, MAKING USE OF HIGH THROUGHPUT TECHNOLOGIES AS DESCRIBED BY CMS-2020-01-R: HCPCS

## 2021-08-15 ENCOUNTER — LAB VISIT (OUTPATIENT)
Dept: INTERNAL MEDICINE | Facility: CLINIC | Age: 41
End: 2021-08-15

## 2021-08-15 DIAGNOSIS — R05.9 COUGH: Primary | ICD-10-CM

## 2021-08-15 DIAGNOSIS — R05.9 COUGH: ICD-10-CM

## 2021-08-15 LAB — SARS-COV-2 RDRP RESP QL NAA+PROBE: POSITIVE

## 2021-08-15 PROCEDURE — U0002 COVID-19 LAB TEST NON-CDC: HCPCS | Performed by: PREVENTIVE MEDICINE

## 2021-08-16 ENCOUNTER — PATIENT MESSAGE (OUTPATIENT)
Dept: ADMINISTRATIVE | Facility: HOSPITAL | Age: 41
End: 2021-08-16

## 2021-08-16 ENCOUNTER — TELEPHONE (OUTPATIENT)
Dept: ADMINISTRATIVE | Facility: HOSPITAL | Age: 41
End: 2021-08-16

## 2021-08-16 DIAGNOSIS — U07.1 COVID-19: Primary | ICD-10-CM

## 2021-08-16 LAB
SARS-COV-2 RNA RESP QL NAA+PROBE: DETECTED
SARS-COV-2- CYCLE NUMBER: 18.4

## 2021-08-17 ENCOUNTER — INFUSION (OUTPATIENT)
Dept: INFECTIOUS DISEASES | Facility: HOSPITAL | Age: 41
End: 2021-08-17
Attending: INTERNAL MEDICINE
Payer: COMMERCIAL

## 2021-08-17 VITALS
WEIGHT: 150 LBS | BODY MASS INDEX: 22.22 KG/M2 | RESPIRATION RATE: 16 BRPM | SYSTOLIC BLOOD PRESSURE: 126 MMHG | HEART RATE: 67 BPM | OXYGEN SATURATION: 99 % | DIASTOLIC BLOOD PRESSURE: 72 MMHG | HEIGHT: 69 IN | TEMPERATURE: 99 F

## 2021-08-17 DIAGNOSIS — U07.1 COVID-19: Primary | ICD-10-CM

## 2021-08-17 PROCEDURE — 63600175 PHARM REV CODE 636 W HCPCS: Performed by: INTERNAL MEDICINE

## 2021-08-17 PROCEDURE — M0243 CASIRIVI AND IMDEVI INFUSION: HCPCS | Performed by: INTERNAL MEDICINE

## 2021-08-17 PROCEDURE — 25000003 PHARM REV CODE 250: Performed by: INTERNAL MEDICINE

## 2021-08-17 RX ORDER — ACETAMINOPHEN 325 MG/1
650 TABLET ORAL ONCE AS NEEDED
Status: DISCONTINUED | OUTPATIENT
Start: 2021-08-17 | End: 2021-11-09

## 2021-08-17 RX ORDER — ONDANSETRON 4 MG/1
4 TABLET, ORALLY DISINTEGRATING ORAL ONCE AS NEEDED
Status: DISCONTINUED | OUTPATIENT
Start: 2021-08-17 | End: 2021-11-09

## 2021-08-17 RX ORDER — ALBUTEROL SULFATE 90 UG/1
2 AEROSOL, METERED RESPIRATORY (INHALATION)
Status: ACTIVE | OUTPATIENT
Start: 2021-08-17

## 2021-08-17 RX ORDER — SODIUM CHLORIDE 0.9 % (FLUSH) 0.9 %
10 SYRINGE (ML) INJECTION
Status: DISCONTINUED | OUTPATIENT
Start: 2021-08-17 | End: 2021-11-09

## 2021-08-17 RX ORDER — DIPHENHYDRAMINE HYDROCHLORIDE 50 MG/ML
25 INJECTION INTRAMUSCULAR; INTRAVENOUS ONCE AS NEEDED
Status: DISCONTINUED | OUTPATIENT
Start: 2021-08-17 | End: 2021-11-09

## 2021-08-17 RX ORDER — EPINEPHRINE 0.3 MG/.3ML
0.3 INJECTION SUBCUTANEOUS
Status: DISCONTINUED | OUTPATIENT
Start: 2021-08-17 | End: 2021-11-09

## 2021-08-17 RX ADMIN — CASIRIVIMAB AND IMDEVIMAB 600 MG: 600; 600 INJECTION, SOLUTION, CONCENTRATE INTRAVENOUS at 07:08

## 2021-09-30 ENCOUNTER — OFFICE VISIT (OUTPATIENT)
Dept: URGENT CARE | Facility: CLINIC | Age: 41
End: 2021-09-30
Payer: COMMERCIAL

## 2021-09-30 VITALS
TEMPERATURE: 98 F | RESPIRATION RATE: 18 BRPM | HEIGHT: 69 IN | OXYGEN SATURATION: 98 % | HEART RATE: 84 BPM | BODY MASS INDEX: 22.22 KG/M2 | WEIGHT: 150 LBS | SYSTOLIC BLOOD PRESSURE: 116 MMHG | DIASTOLIC BLOOD PRESSURE: 76 MMHG

## 2021-09-30 DIAGNOSIS — R11.0 NAUSEA: ICD-10-CM

## 2021-09-30 DIAGNOSIS — J02.9 PHARYNGITIS, UNSPECIFIED ETIOLOGY: Primary | ICD-10-CM

## 2021-09-30 LAB
CTP QC/QA: YES
MOLECULAR STREP A: NEGATIVE

## 2021-09-30 PROCEDURE — 87651 STREP A DNA AMP PROBE: CPT | Mod: QW,S$GLB,, | Performed by: FAMILY MEDICINE

## 2021-09-30 PROCEDURE — 99213 OFFICE O/P EST LOW 20 MIN: CPT | Mod: S$GLB,,, | Performed by: FAMILY MEDICINE

## 2021-09-30 PROCEDURE — 87651 POCT STREP A MOLECULAR: ICD-10-PCS | Mod: QW,S$GLB,, | Performed by: FAMILY MEDICINE

## 2021-09-30 PROCEDURE — 99213 PR OFFICE/OUTPT VISIT, EST, LEVL III, 20-29 MIN: ICD-10-PCS | Mod: S$GLB,,, | Performed by: FAMILY MEDICINE

## 2021-10-05 ENCOUNTER — PATIENT MESSAGE (OUTPATIENT)
Dept: ADMINISTRATIVE | Facility: HOSPITAL | Age: 41
End: 2021-10-05

## 2021-10-15 ENCOUNTER — PATIENT OUTREACH (OUTPATIENT)
Dept: ADMINISTRATIVE | Facility: OTHER | Age: 41
End: 2021-10-15

## 2021-10-18 ENCOUNTER — OFFICE VISIT (OUTPATIENT)
Dept: OBSTETRICS AND GYNECOLOGY | Facility: CLINIC | Age: 41
End: 2021-10-18
Payer: COMMERCIAL

## 2021-10-18 ENCOUNTER — LAB VISIT (OUTPATIENT)
Dept: LAB | Facility: OTHER | Age: 41
End: 2021-10-18
Attending: OBSTETRICS & GYNECOLOGY
Payer: COMMERCIAL

## 2021-10-18 VITALS
BODY MASS INDEX: 23.09 KG/M2 | HEIGHT: 69 IN | SYSTOLIC BLOOD PRESSURE: 110 MMHG | WEIGHT: 155.88 LBS | DIASTOLIC BLOOD PRESSURE: 80 MMHG

## 2021-10-18 DIAGNOSIS — Z00.00 HEALTH CARE MAINTENANCE: ICD-10-CM

## 2021-10-18 DIAGNOSIS — N94.6 DYSMENORRHEA: ICD-10-CM

## 2021-10-18 DIAGNOSIS — N89.8 VAGINAL IRRITATION: ICD-10-CM

## 2021-10-18 DIAGNOSIS — Z80.3 FAMILY HISTORY OF BREAST CANCER: ICD-10-CM

## 2021-10-18 DIAGNOSIS — Z12.31 BREAST CANCER SCREENING BY MAMMOGRAM: ICD-10-CM

## 2021-10-18 DIAGNOSIS — Z01.419 ENCOUNTER FOR ANNUAL ROUTINE GYNECOLOGICAL EXAMINATION: Primary | ICD-10-CM

## 2021-10-18 LAB
ALBUMIN SERPL BCP-MCNC: 3.9 G/DL (ref 3.5–5.2)
ALP SERPL-CCNC: 60 U/L (ref 55–135)
ALT SERPL W/O P-5'-P-CCNC: 30 U/L (ref 10–44)
ANION GAP SERPL CALC-SCNC: 10 MMOL/L (ref 8–16)
AST SERPL-CCNC: 29 U/L (ref 10–40)
BASOPHILS # BLD AUTO: 0.06 K/UL (ref 0–0.2)
BASOPHILS NFR BLD: 1 % (ref 0–1.9)
BILIRUB SERPL-MCNC: 0.5 MG/DL (ref 0.1–1)
BUN SERPL-MCNC: 7 MG/DL (ref 6–20)
CALCIUM SERPL-MCNC: 8.8 MG/DL (ref 8.7–10.5)
CHLORIDE SERPL-SCNC: 107 MMOL/L (ref 95–110)
CHOLEST SERPL-MCNC: 162 MG/DL (ref 120–199)
CHOLEST/HDLC SERPL: 2.4 {RATIO} (ref 2–5)
CO2 SERPL-SCNC: 22 MMOL/L (ref 23–29)
CREAT SERPL-MCNC: 0.8 MG/DL (ref 0.5–1.4)
DIFFERENTIAL METHOD: ABNORMAL
EOSINOPHIL # BLD AUTO: 0.1 K/UL (ref 0–0.5)
EOSINOPHIL NFR BLD: 1.4 % (ref 0–8)
ERYTHROCYTE [DISTWIDTH] IN BLOOD BY AUTOMATED COUNT: 12 % (ref 11.5–14.5)
EST. GFR  (AFRICAN AMERICAN): >60 ML/MIN/1.73 M^2
EST. GFR  (NON AFRICAN AMERICAN): >60 ML/MIN/1.73 M^2
GLUCOSE SERPL-MCNC: 86 MG/DL (ref 70–110)
HCT VFR BLD AUTO: 35.5 % (ref 37–48.5)
HDLC SERPL-MCNC: 67 MG/DL (ref 40–75)
HDLC SERPL: 41.4 % (ref 20–50)
HGB BLD-MCNC: 12 G/DL (ref 12–16)
IMM GRANULOCYTES # BLD AUTO: 0.02 K/UL (ref 0–0.04)
IMM GRANULOCYTES NFR BLD AUTO: 0.3 % (ref 0–0.5)
LDLC SERPL CALC-MCNC: 64.4 MG/DL (ref 63–159)
LYMPHOCYTES # BLD AUTO: 1.9 K/UL (ref 1–4.8)
LYMPHOCYTES NFR BLD: 33.5 % (ref 18–48)
MCH RBC QN AUTO: 29.5 PG (ref 27–31)
MCHC RBC AUTO-ENTMCNC: 33.8 G/DL (ref 32–36)
MCV RBC AUTO: 87 FL (ref 82–98)
MONOCYTES # BLD AUTO: 0.5 K/UL (ref 0.3–1)
MONOCYTES NFR BLD: 7.8 % (ref 4–15)
NEUTROPHILS # BLD AUTO: 3.2 K/UL (ref 1.8–7.7)
NEUTROPHILS NFR BLD: 56 % (ref 38–73)
NONHDLC SERPL-MCNC: 95 MG/DL
NRBC BLD-RTO: 0 /100 WBC
PLATELET # BLD AUTO: 268 K/UL (ref 150–450)
PMV BLD AUTO: 9 FL (ref 9.2–12.9)
POTASSIUM SERPL-SCNC: 3.9 MMOL/L (ref 3.5–5.1)
PROT SERPL-MCNC: 7 G/DL (ref 6–8.4)
RBC # BLD AUTO: 4.07 M/UL (ref 4–5.4)
SODIUM SERPL-SCNC: 139 MMOL/L (ref 136–145)
TRIGL SERPL-MCNC: 153 MG/DL (ref 30–150)
TSH SERPL DL<=0.005 MIU/L-ACNC: 0.9 UIU/ML (ref 0.4–4)
WBC # BLD AUTO: 5.79 K/UL (ref 3.9–12.7)

## 2021-10-18 PROCEDURE — 99214 OFFICE O/P EST MOD 30 MIN: CPT | Mod: PBBFAC | Performed by: OBSTETRICS & GYNECOLOGY

## 2021-10-18 PROCEDURE — 99396 PREV VISIT EST AGE 40-64: CPT | Mod: S$GLB,,, | Performed by: OBSTETRICS & GYNECOLOGY

## 2021-10-18 PROCEDURE — 36415 COLL VENOUS BLD VENIPUNCTURE: CPT | Performed by: OBSTETRICS & GYNECOLOGY

## 2021-10-18 PROCEDURE — 84443 ASSAY THYROID STIM HORMONE: CPT | Performed by: OBSTETRICS & GYNECOLOGY

## 2021-10-18 PROCEDURE — 80061 LIPID PANEL: CPT | Performed by: OBSTETRICS & GYNECOLOGY

## 2021-10-18 PROCEDURE — 99999 PR PBB SHADOW E&M-EST. PATIENT-LVL IV: ICD-10-PCS | Mod: PBBFAC,,, | Performed by: OBSTETRICS & GYNECOLOGY

## 2021-10-18 PROCEDURE — 80053 COMPREHEN METABOLIC PANEL: CPT | Performed by: OBSTETRICS & GYNECOLOGY

## 2021-10-18 PROCEDURE — 99999 PR PBB SHADOW E&M-EST. PATIENT-LVL IV: CPT | Mod: PBBFAC,,, | Performed by: OBSTETRICS & GYNECOLOGY

## 2021-10-18 PROCEDURE — 85025 COMPLETE CBC W/AUTO DIFF WBC: CPT | Performed by: OBSTETRICS & GYNECOLOGY

## 2021-10-18 PROCEDURE — 87481 CANDIDA DNA AMP PROBE: CPT | Mod: 59 | Performed by: OBSTETRICS & GYNECOLOGY

## 2021-10-18 PROCEDURE — 99396 PR PREVENTIVE VISIT,EST,40-64: ICD-10-PCS | Mod: S$GLB,,, | Performed by: OBSTETRICS & GYNECOLOGY

## 2021-10-18 RX ORDER — FLUCONAZOLE 150 MG/1
150 TABLET ORAL
Qty: 2 TABLET | Refills: 0 | Status: SHIPPED | OUTPATIENT
Start: 2021-10-18 | End: 2021-10-22

## 2021-10-18 RX ORDER — NAPROXEN 500 MG/1
500 TABLET ORAL 2 TIMES DAILY WITH MEALS
Qty: 30 TABLET | Refills: 0 | Status: SHIPPED | OUTPATIENT
Start: 2021-10-18 | End: 2021-11-09

## 2021-10-18 RX ORDER — AMOXICILLIN 875 MG/1
875 TABLET, FILM COATED ORAL 2 TIMES DAILY
COMMUNITY
Start: 2021-10-10 | End: 2021-11-03 | Stop reason: ALTCHOICE

## 2021-10-22 ENCOUNTER — PATIENT MESSAGE (OUTPATIENT)
Dept: OBSTETRICS AND GYNECOLOGY | Facility: CLINIC | Age: 41
End: 2021-10-22
Payer: COMMERCIAL

## 2021-10-22 LAB
BACTERIAL VAGINOSIS DNA: NEGATIVE
CANDIDA GLABRATA DNA: NEGATIVE
CANDIDA KRUSEI DNA: NEGATIVE
CANDIDA RRNA VAG QL PROBE: POSITIVE
T VAGINALIS RRNA GENITAL QL PROBE: NEGATIVE

## 2021-10-23 ENCOUNTER — TELEPHONE (OUTPATIENT)
Dept: OBSTETRICS AND GYNECOLOGY | Facility: CLINIC | Age: 41
End: 2021-10-23
Payer: COMMERCIAL

## 2021-10-23 RX ORDER — DROSPIRENONE 4 MG/1
4 TABLET, FILM COATED ORAL DAILY
Qty: 90 TABLET | Refills: 3 | Status: SHIPPED | OUTPATIENT
Start: 2021-10-23 | End: 2022-02-02

## 2021-10-25 ENCOUNTER — PATIENT MESSAGE (OUTPATIENT)
Dept: OBSTETRICS AND GYNECOLOGY | Facility: CLINIC | Age: 41
End: 2021-10-25
Payer: COMMERCIAL

## 2021-10-28 DIAGNOSIS — R51.9 NONINTRACTABLE HEADACHE, UNSPECIFIED CHRONICITY PATTERN, UNSPECIFIED HEADACHE TYPE: Primary | ICD-10-CM

## 2021-10-28 RX ORDER — BUTALBITAL, ACETAMINOPHEN AND CAFFEINE 50; 325; 40 MG/1; MG/1; MG/1
1 TABLET ORAL EVERY 6 HOURS PRN
Qty: 30 TABLET | Refills: 0 | Status: SHIPPED | OUTPATIENT
Start: 2021-10-28 | End: 2021-11-27

## 2021-10-29 ENCOUNTER — TELEPHONE (OUTPATIENT)
Dept: PAIN MEDICINE | Facility: CLINIC | Age: 41
End: 2021-10-29
Payer: COMMERCIAL

## 2021-11-01 ENCOUNTER — OFFICE VISIT (OUTPATIENT)
Dept: PAIN MEDICINE | Facility: CLINIC | Age: 41
End: 2021-11-01
Attending: ANESTHESIOLOGY
Payer: COMMERCIAL

## 2021-11-01 VITALS
WEIGHT: 153.69 LBS | SYSTOLIC BLOOD PRESSURE: 128 MMHG | TEMPERATURE: 99 F | BODY MASS INDEX: 22.76 KG/M2 | DIASTOLIC BLOOD PRESSURE: 78 MMHG | HEART RATE: 86 BPM | HEIGHT: 69 IN

## 2021-11-01 DIAGNOSIS — M62.838 MUSCLE SPASM: Primary | ICD-10-CM

## 2021-11-01 DIAGNOSIS — G24.3 ISOLATED CERVICAL DYSTONIA: ICD-10-CM

## 2021-11-01 PROCEDURE — 99999 PR PBB SHADOW E&M-EST. PATIENT-LVL V: CPT | Mod: PBBFAC,,, | Performed by: ANESTHESIOLOGY

## 2021-11-01 PROCEDURE — 99204 PR OFFICE/OUTPT VISIT, NEW, LEVL IV, 45-59 MIN: ICD-10-PCS | Mod: S$GLB,,, | Performed by: ANESTHESIOLOGY

## 2021-11-01 PROCEDURE — 99215 OFFICE O/P EST HI 40 MIN: CPT | Mod: PBBFAC | Performed by: ANESTHESIOLOGY

## 2021-11-01 PROCEDURE — 99999 PR PBB SHADOW E&M-EST. PATIENT-LVL V: ICD-10-PCS | Mod: PBBFAC,,, | Performed by: ANESTHESIOLOGY

## 2021-11-01 PROCEDURE — 99204 OFFICE O/P NEW MOD 45 MIN: CPT | Mod: S$GLB,,, | Performed by: ANESTHESIOLOGY

## 2021-11-01 RX ORDER — BACLOFEN 10 MG/1
5 TABLET ORAL 3 TIMES DAILY
Qty: 90 TABLET | Refills: 1 | Status: SHIPPED | OUTPATIENT
Start: 2021-11-01 | End: 2022-06-08

## 2021-11-01 RX ORDER — TRANEXAMIC ACID 650 MG/1
1300 TABLET ORAL 3 TIMES DAILY
COMMUNITY
End: 2022-03-14 | Stop reason: SDUPTHER

## 2021-11-03 ENCOUNTER — TELEPHONE (OUTPATIENT)
Dept: ADMINISTRATIVE | Facility: OTHER | Age: 41
End: 2021-11-03
Payer: COMMERCIAL

## 2021-11-08 ENCOUNTER — TELEPHONE (OUTPATIENT)
Dept: PAIN MEDICINE | Facility: CLINIC | Age: 41
End: 2021-11-08
Payer: COMMERCIAL

## 2021-11-09 ENCOUNTER — OFFICE VISIT (OUTPATIENT)
Dept: PAIN MEDICINE | Facility: CLINIC | Age: 41
End: 2021-11-09
Attending: ANESTHESIOLOGY
Payer: COMMERCIAL

## 2021-11-09 VITALS
BODY MASS INDEX: 22.51 KG/M2 | TEMPERATURE: 98 F | HEART RATE: 88 BPM | HEIGHT: 69 IN | SYSTOLIC BLOOD PRESSURE: 127 MMHG | DIASTOLIC BLOOD PRESSURE: 94 MMHG | OXYGEN SATURATION: 100 % | WEIGHT: 152 LBS | RESPIRATION RATE: 18 BRPM

## 2021-11-09 DIAGNOSIS — G24.3 ISOLATED CERVICAL DYSTONIA: Primary | ICD-10-CM

## 2021-11-09 DIAGNOSIS — M62.838 MUSCLE SPASM: ICD-10-CM

## 2021-11-09 PROCEDURE — 64616 CHEMODENERV MUSC NECK DYSTON: CPT | Mod: S$PBB,,, | Performed by: ANESTHESIOLOGY

## 2021-11-09 PROCEDURE — 99214 OFFICE O/P EST MOD 30 MIN: CPT | Mod: PBBFAC,25 | Performed by: ANESTHESIOLOGY

## 2021-11-09 PROCEDURE — 95874 PR NEEDLE EMG GUIDANCE FOR CHEMODENERVATION: ICD-10-PCS | Mod: 26,S$PBB,, | Performed by: ANESTHESIOLOGY

## 2021-11-09 PROCEDURE — 99999 PR PBB SHADOW E&M-EST. PATIENT-LVL IV: CPT | Mod: PBBFAC,,, | Performed by: ANESTHESIOLOGY

## 2021-11-09 PROCEDURE — 99499 NO LOS: ICD-10-PCS | Mod: S$PBB,,, | Performed by: ANESTHESIOLOGY

## 2021-11-09 PROCEDURE — 64616 PR CHEMODENERVATION NECK MUSCLES EXC LARNYNX, UNI: ICD-10-PCS | Mod: S$PBB,,, | Performed by: ANESTHESIOLOGY

## 2021-11-09 PROCEDURE — 99499 UNLISTED E&M SERVICE: CPT | Mod: S$PBB,,, | Performed by: ANESTHESIOLOGY

## 2021-11-09 PROCEDURE — 99999 PR PBB SHADOW E&M-EST. PATIENT-LVL IV: ICD-10-PCS | Mod: PBBFAC,,, | Performed by: ANESTHESIOLOGY

## 2021-11-09 PROCEDURE — 95874 GUIDE NERV DESTR NEEDLE EMG: CPT | Mod: 26,S$PBB,, | Performed by: ANESTHESIOLOGY

## 2021-11-09 PROCEDURE — 64616 CHEMODENERV MUSC NECK DYSTON: CPT | Mod: PBBFAC | Performed by: ANESTHESIOLOGY

## 2021-11-09 PROCEDURE — 95874 GUIDE NERV DESTR NEEDLE EMG: CPT | Mod: PBBFAC | Performed by: ANESTHESIOLOGY

## 2021-11-09 RX ADMIN — ONABOTULINUMTOXINA 200 UNITS: 100 INJECTION, POWDER, LYOPHILIZED, FOR SOLUTION INTRADERMAL; INTRAMUSCULAR at 11:11

## 2021-11-17 ENCOUNTER — OFFICE VISIT (OUTPATIENT)
Dept: NEUROLOGY | Facility: CLINIC | Age: 41
End: 2021-11-17
Payer: COMMERCIAL

## 2021-11-17 VITALS
DIASTOLIC BLOOD PRESSURE: 80 MMHG | WEIGHT: 153 LBS | SYSTOLIC BLOOD PRESSURE: 133 MMHG | BODY MASS INDEX: 22.66 KG/M2 | HEART RATE: 81 BPM | HEIGHT: 69 IN

## 2021-11-17 DIAGNOSIS — M79.18 CERVICAL MYOFASCIAL PAIN SYNDROME: ICD-10-CM

## 2021-11-17 DIAGNOSIS — G43.719 CHRONIC MIGRAINE WITHOUT AURA, INTRACTABLE, WITHOUT STATUS MIGRAINOSUS: Primary | ICD-10-CM

## 2021-11-17 PROCEDURE — 99213 OFFICE O/P EST LOW 20 MIN: CPT | Mod: PBBFAC | Performed by: PSYCHIATRY & NEUROLOGY

## 2021-11-17 PROCEDURE — 99999 PR PBB SHADOW E&M-EST. PATIENT-LVL III: CPT | Mod: PBBFAC,,, | Performed by: PSYCHIATRY & NEUROLOGY

## 2021-11-17 PROCEDURE — 99244 PR OFFICE CONSULTATION,LEVEL IV: ICD-10-PCS | Mod: S$GLB,,, | Performed by: PSYCHIATRY & NEUROLOGY

## 2021-11-17 PROCEDURE — 99999 PR PBB SHADOW E&M-EST. PATIENT-LVL III: ICD-10-PCS | Mod: PBBFAC,,, | Performed by: PSYCHIATRY & NEUROLOGY

## 2021-11-17 PROCEDURE — 99244 OFF/OP CNSLTJ NEW/EST MOD 40: CPT | Mod: S$GLB,,, | Performed by: PSYCHIATRY & NEUROLOGY

## 2021-11-17 RX ORDER — SUMATRIPTAN SUCCINATE 100 MG/1
100 TABLET ORAL
Qty: 27 TABLET | Refills: 3 | Status: SHIPPED | OUTPATIENT
Start: 2021-11-17 | End: 2021-12-20

## 2021-11-29 ENCOUNTER — CLINICAL SUPPORT (OUTPATIENT)
Dept: REHABILITATION | Facility: HOSPITAL | Age: 41
End: 2021-11-29
Attending: PSYCHIATRY & NEUROLOGY
Payer: COMMERCIAL

## 2021-11-29 ENCOUNTER — IMMUNIZATION (OUTPATIENT)
Dept: INTERNAL MEDICINE | Facility: CLINIC | Age: 41
End: 2021-11-29
Payer: COMMERCIAL

## 2021-11-29 DIAGNOSIS — M79.18 CERVICAL MYOFASCIAL PAIN SYNDROME: ICD-10-CM

## 2021-11-29 DIAGNOSIS — Z23 NEED FOR VACCINATION: Primary | ICD-10-CM

## 2021-11-29 DIAGNOSIS — G43.719 CHRONIC MIGRAINE WITHOUT AURA, INTRACTABLE, WITHOUT STATUS MIGRAINOSUS: ICD-10-CM

## 2021-11-29 PROCEDURE — 97110 THERAPEUTIC EXERCISES: CPT | Mod: ,,, | Performed by: PSYCHIATRY & NEUROLOGY

## 2021-11-29 PROCEDURE — 97161 PR PT EVAL LOW COMPLEXITY 20 MINS: ICD-10-PCS | Mod: ,,, | Performed by: PSYCHIATRY & NEUROLOGY

## 2021-11-29 PROCEDURE — 0004A COVID-19, MRNA, LNP-S, PF, 30 MCG/0.3 ML DOSE VACCINE: CPT | Mod: PBBFAC | Performed by: INTERNAL MEDICINE

## 2021-11-29 PROCEDURE — 97161 PT EVAL LOW COMPLEX 20 MIN: CPT | Mod: PO

## 2021-11-29 PROCEDURE — 97110 PR THERAPEUTIC EXERCISES: ICD-10-PCS | Mod: ,,, | Performed by: PSYCHIATRY & NEUROLOGY

## 2021-11-29 PROCEDURE — 97110 THERAPEUTIC EXERCISES: CPT | Mod: PO

## 2021-11-29 PROCEDURE — 97140 MANUAL THERAPY 1/> REGIONS: CPT | Mod: PO

## 2021-11-29 PROCEDURE — 97161 PT EVAL LOW COMPLEX 20 MIN: CPT | Mod: ,,, | Performed by: PSYCHIATRY & NEUROLOGY

## 2021-12-01 ENCOUNTER — CLINICAL SUPPORT (OUTPATIENT)
Dept: REHABILITATION | Facility: HOSPITAL | Age: 41
End: 2021-12-01
Attending: PSYCHIATRY & NEUROLOGY
Payer: COMMERCIAL

## 2021-12-01 DIAGNOSIS — G43.719 CHRONIC MIGRAINE WITHOUT AURA, INTRACTABLE, WITHOUT STATUS MIGRAINOSUS: Primary | ICD-10-CM

## 2021-12-01 DIAGNOSIS — M79.18 CERVICAL MYOFASCIAL PAIN SYNDROME: ICD-10-CM

## 2021-12-01 PROCEDURE — 97140 MANUAL THERAPY 1/> REGIONS: CPT | Mod: PO,CQ

## 2021-12-01 PROCEDURE — 97110 THERAPEUTIC EXERCISES: CPT | Mod: PO,CQ

## 2021-12-06 ENCOUNTER — TELEPHONE (OUTPATIENT)
Dept: PAIN MEDICINE | Facility: CLINIC | Age: 41
End: 2021-12-06
Payer: COMMERCIAL

## 2021-12-09 ENCOUNTER — CLINICAL SUPPORT (OUTPATIENT)
Dept: REHABILITATION | Facility: HOSPITAL | Age: 41
End: 2021-12-09
Attending: PSYCHIATRY & NEUROLOGY
Payer: COMMERCIAL

## 2021-12-09 DIAGNOSIS — M79.18 CERVICAL MYOFASCIAL PAIN SYNDROME: ICD-10-CM

## 2021-12-09 DIAGNOSIS — G43.719 CHRONIC MIGRAINE WITHOUT AURA, INTRACTABLE, WITHOUT STATUS MIGRAINOSUS: Primary | ICD-10-CM

## 2021-12-09 PROCEDURE — 97140 MANUAL THERAPY 1/> REGIONS: CPT | Mod: PO,CQ

## 2021-12-09 PROCEDURE — 97110 THERAPEUTIC EXERCISES: CPT | Mod: PO,CQ

## 2021-12-13 ENCOUNTER — TELEPHONE (OUTPATIENT)
Dept: PAIN MEDICINE | Facility: CLINIC | Age: 41
End: 2021-12-13
Payer: COMMERCIAL

## 2021-12-14 ENCOUNTER — TELEPHONE (OUTPATIENT)
Dept: PAIN MEDICINE | Facility: CLINIC | Age: 41
End: 2021-12-14

## 2021-12-14 ENCOUNTER — OFFICE VISIT (OUTPATIENT)
Dept: PAIN MEDICINE | Facility: CLINIC | Age: 41
End: 2021-12-14
Payer: COMMERCIAL

## 2021-12-14 ENCOUNTER — TELEPHONE (OUTPATIENT)
Dept: PAIN MEDICINE | Facility: CLINIC | Age: 41
End: 2021-12-14
Payer: COMMERCIAL

## 2021-12-14 DIAGNOSIS — G24.3 ISOLATED CERVICAL DYSTONIA: Primary | ICD-10-CM

## 2021-12-14 DIAGNOSIS — M79.18 CERVICAL MYOFASCIAL PAIN SYNDROME: ICD-10-CM

## 2021-12-14 DIAGNOSIS — M62.838 MUSCLE SPASM: ICD-10-CM

## 2021-12-14 DIAGNOSIS — G43.719 CHRONIC MIGRAINE WITHOUT AURA, INTRACTABLE, WITHOUT STATUS MIGRAINOSUS: ICD-10-CM

## 2021-12-14 PROCEDURE — 99213 PR OFFICE/OUTPT VISIT, EST, LEVL III, 20-29 MIN: ICD-10-PCS | Mod: 95,,, | Performed by: NURSE PRACTITIONER

## 2021-12-14 PROCEDURE — 99213 OFFICE O/P EST LOW 20 MIN: CPT | Mod: 95,,, | Performed by: NURSE PRACTITIONER

## 2021-12-15 ENCOUNTER — CLINICAL SUPPORT (OUTPATIENT)
Dept: REHABILITATION | Facility: HOSPITAL | Age: 41
End: 2021-12-15
Attending: PSYCHIATRY & NEUROLOGY
Payer: COMMERCIAL

## 2021-12-15 ENCOUNTER — PATIENT MESSAGE (OUTPATIENT)
Dept: NEUROLOGY | Facility: CLINIC | Age: 41
End: 2021-12-15
Payer: COMMERCIAL

## 2021-12-15 DIAGNOSIS — G43.719 CHRONIC MIGRAINE WITHOUT AURA, INTRACTABLE, WITHOUT STATUS MIGRAINOSUS: Primary | ICD-10-CM

## 2021-12-15 PROCEDURE — 97110 THERAPEUTIC EXERCISES: CPT | Mod: PO,CQ

## 2021-12-15 PROCEDURE — 97140 MANUAL THERAPY 1/> REGIONS: CPT | Mod: PO,CQ

## 2021-12-17 ENCOUNTER — CLINICAL SUPPORT (OUTPATIENT)
Dept: REHABILITATION | Facility: HOSPITAL | Age: 41
End: 2021-12-17
Attending: PSYCHIATRY & NEUROLOGY
Payer: COMMERCIAL

## 2021-12-17 PROCEDURE — 97140 MANUAL THERAPY 1/> REGIONS: CPT | Mod: PO

## 2021-12-19 ENCOUNTER — PATIENT MESSAGE (OUTPATIENT)
Dept: REHABILITATION | Facility: HOSPITAL | Age: 41
End: 2021-12-19
Payer: COMMERCIAL

## 2021-12-20 ENCOUNTER — PATIENT MESSAGE (OUTPATIENT)
Dept: REHABILITATION | Facility: HOSPITAL | Age: 41
End: 2021-12-20
Payer: COMMERCIAL

## 2021-12-20 RX ORDER — RIZATRIPTAN BENZOATE 10 MG/1
10 TABLET ORAL
Qty: 27 TABLET | Refills: 3 | Status: SHIPPED | OUTPATIENT
Start: 2021-12-20 | End: 2022-08-15 | Stop reason: SDUPTHER

## 2022-01-05 ENCOUNTER — HOSPITAL ENCOUNTER (OUTPATIENT)
Dept: RADIOLOGY | Facility: HOSPITAL | Age: 42
Discharge: HOME | End: 2022-01-05
Attending: OBSTETRICS & GYNECOLOGY
Payer: COMMERCIAL

## 2022-01-05 ENCOUNTER — CLINICAL SUPPORT (OUTPATIENT)
Dept: REHABILITATION | Facility: HOSPITAL | Age: 42
End: 2022-01-05
Attending: PSYCHIATRY & NEUROLOGY
Payer: COMMERCIAL

## 2022-01-05 DIAGNOSIS — Z80.3 FAMILY HISTORY OF BREAST CANCER: ICD-10-CM

## 2022-01-05 DIAGNOSIS — R29.898 DECREASED ROM OF NECK: Primary | ICD-10-CM

## 2022-01-05 DIAGNOSIS — Z12.31 BREAST CANCER SCREENING BY MAMMOGRAM: ICD-10-CM

## 2022-01-05 PROCEDURE — 97164 PT RE-EVAL EST PLAN CARE: CPT | Mod: PO

## 2022-01-05 PROCEDURE — 77067 SCR MAMMO BI INCL CAD: CPT | Mod: 26,,, | Performed by: RADIOLOGY

## 2022-01-05 PROCEDURE — 97140 MANUAL THERAPY 1/> REGIONS: CPT | Mod: PO

## 2022-01-05 PROCEDURE — 77063 BREAST TOMOSYNTHESIS BI: CPT | Mod: TC,PN

## 2022-01-05 PROCEDURE — 77063 MAMMO DIGITAL SCREENING BILAT WITH TOMO: ICD-10-PCS | Mod: 26,,, | Performed by: RADIOLOGY

## 2022-01-05 PROCEDURE — 77067 SCR MAMMO BI INCL CAD: CPT | Mod: TC,PN

## 2022-01-05 PROCEDURE — 77063 BREAST TOMOSYNTHESIS BI: CPT | Mod: 26,,, | Performed by: RADIOLOGY

## 2022-01-05 PROCEDURE — 77067 MAMMO DIGITAL SCREENING BILAT WITH TOMO: ICD-10-PCS | Mod: 26,,, | Performed by: RADIOLOGY

## 2022-01-05 NOTE — PROGRESS NOTES
"  [facility]   Physical Therapy Treatment Note / Progress Note/ Updated Plan of Care    Name: Wendy Munoz  Clinic Number: [id number]    Therapy Diagnosis:   Encounter Diagnosis   Name Primary?    Decreased ROM of neck Yes     Physician: Jose Melendez MD    Visit Date: 1/5/2022    Physician Orders: PT Eval and Treat   Medical Diagnosis from Referral:   G43.719 (ICD-10-CM) - Chronic migraine without aura, intractable, without status migrainosus   M79.18 (ICD-10-CM) - Cervical myofascial pain syndrome         Evaluation Date: 11/29/2021  Authorization Period Expiration: 11/17/2022  Plan of Care Expiration: 3/31/2022  Progress Note Due: 2/05/2022  Visit # / Visits authorized: 5 / 20   FOTO: tbd/ tbd      Precautions: Standard      Time In: 1015  Time Out: 1106  Total Billable Time:  51 minutes    SUBJECTIVE     Pt reports: was sore for 24 hours post dry needling but got relief for about one week.     She was compliant with home exercise program since it was just on Monday.   Response to previous treatment: felt fine    Functional change: ongoing     Pain: 5/10  Location: L>R neck      OBJECTIVE     Objective Measures updated at progress report unless specified.     Cervical Range of Motion:     Degrees(%) Pain   Flexion 40 yes      Extension 48 Yes (worse)      Right Rotation 64, pain on left  yes      Left Rotation 62 yes      Right Side Bending 42, stretch pain on left  yes   Left Side Bending 42, pain on left near lower cervical spine Yes (worse)        Cervical joint mobility: Limited worst in left SB and rotation, limited in all planes        Thoracic mobility: Limited with significant TOP on SP T3-6     Palpation: TOP levator scap, UFT, sub occipitals, paraspinals         Treatment     Wendy received the treatments listed below:      therapeutic exercises to develop strength, endurance, ROM, flexibility and posture for 00 minutes including:    Upper trap stretch 3 x 20" " "hold   Levator scap stretch 3 x 30" hold   Scapular retractions 2 x 10   B shoulder no money (no resistance) 2 x 10   Supine chin tucks x 20 3"  Supine pec stretch on half foam roll x 2 min   Side lying open books x 10, 5" hold  Shoulder extension GTB 2 x 10   Shoulder rows GTB 2 x 10       manual therapy techniques: Soft tissue Mobilization were applied for 38 minutes, including:  Patient consented to trigger point dry needling with electrical stimulation on this date. 70% isopropyl alcohol wipe down performed to treatment site with single use sterile prep pads.TDN with electrical stimulation to bilateral suboccipital muscles using 0.30 x 40 Seirin J Type Needles and C3 multifidus muscles x10 minutes at continuous wave frequency. Treatment performed in prone position with intensity set to patient tolerance. All needles were removed. Followed up with gentle STM. TDN without electrical stimulation to right sternocleidomastoid using 0.30 x30 Seirin J Type Needle using pistoning technique. Treatment performed in prone position. Instructed patient to use heat to area, drink fluids (water), and perform HEP and light activity to reduce soreness.         Patient Education and Home Exercises     Home Exercises Provided and Patient Education Provided     Education:  -HEP  -dry needling risks and benefits     Written Home Exercises Provided: Patient instructed to cont prior HEP. Exercises were reviewed and Wendy was able to demonstrate them prior to the end of the session.  Wendy demonstrated good  understanding of the education provided. See EMR under Patient Instructions for exercises provided during therapy sessions    ASSESSMENT     Update: Pt continues with upper cervical dysfunction (C0-C1 and C2-C3) contributing to a decrease in cervical rotation ROM. Did not assess upper extremity strength today but will assess in the future as well as deep cervical neck flexor endurance. She consented to dry needling with " electrical stimulation on this date with localized active trigger points casuing reproduction of symptoms. Will continue to benefit from skilled outpatient physical therapy to address deficits to maximize patient's level of independence.      Previous Short Term Goals Status:   1/4 short term goals met  New Short Term Goals Status:   Progressing towards remaining short term goals  Long Term Goal Status: continue per initial plan of care.  Reasons for Recertification of Therapy:   To decrease frequency of headaches and improve upper cervical mobility        Wendy is progressing well towards her goals.   Pt prognosis is Good.     Pt will continue to benefit from skilled outpatient physical therapy to address the deficits listed in the problem list box on initial evaluation, provide pt/family education and to maximize pt's level of independence in the home and community environment.     Pt's spiritual, cultural and educational needs considered and pt agreeable to plan of care and goals.     Anticipated barriers to physical therapy: none    Goals:   Short Term Goals: 6 weeks  1.Report decreased neck/head pain  <   / =  4  /10  to increase tolerance for ADL's, work activities, and self care. Progressing, 01/05/2022  2. Increase cervical ROM by 5-10 degrees in order to perform ADLs with decreased difficulty. Progressing, 01/05/2022  3. Increase strength in B shoulders/scapular stabilizers by 1/3 MMT grade to increase tolerance for ADL and work activities. Not assessed   4. Pt to tolerate HEP to improve ROM and independence with ADL's. Met, 01/05/2022     Long Term Goals: 10 weeks  1.Report decreased neck/head pain  <   / =  0-1 /10  to increase tolerance for ADL's, work activities, and self care  2. Increase UE/neck flexibility in order to improve posture.    3.Increased strength in B shoulders/scapular stabilizers to >/= 4/5 MMT grade to increase tolerance for ADL and work activities.       PLAN     Updated  Certification Period: extend to 3/31/2022   Recommended Treatment Plan: 2 times per week for 8 weeks:  Cervical/Lumbar Traction, Manual Therapy, Moist Heat/ Ice, Neuromuscular Re-ed, Patient Education, Therapeutic Activities, Therapeutic Exercise and Dry Needling  Other Recommendations: none      I CERTIFY THE NEED FOR THESE SERVICES FURNISHED UNDER THIS PLAN OF TREATMENT AND WHILE UNDER MY CARE  Physician's comments:      Physician's Signature: ___________________________________________________      Awa Langston, PT

## 2022-01-05 NOTE — PLAN OF CARE
"  [facility]   Physical Therapy Treatment Note / Progress Note/ Updated Plan of Care    Name: Wendy Munoz  Clinic Number: [id number]    Therapy Diagnosis:   Encounter Diagnosis   Name Primary?    Decreased ROM of neck Yes     Physician: Jose Melendez MD    Visit Date: 1/5/2022    Physician Orders: PT Eval and Treat   Medical Diagnosis from Referral:   G43.719 (ICD-10-CM) - Chronic migraine without aura, intractable, without status migrainosus   M79.18 (ICD-10-CM) - Cervical myofascial pain syndrome         Evaluation Date: 11/29/2021  Authorization Period Expiration: 11/17/2022  Plan of Care Expiration: 3/31/2022  Progress Note Due: 2/05/2022  Visit # / Visits authorized: 5 / 20   FOTO: tbd/ tbd      Precautions: Standard      Time In: 1015  Time Out: 1106  Total Billable Time:  51 minutes    SUBJECTIVE     Pt reports: was sore for 24 hours post dry needling but got relief for about one week.     She was compliant with home exercise program since it was just on Monday.   Response to previous treatment: felt fine    Functional change: ongoing     Pain: 5/10  Location: L>R neck      OBJECTIVE     Objective Measures updated at progress report unless specified.     Cervical Range of Motion:     Degrees(%) Pain   Flexion 40 yes      Extension 48 Yes (worse)      Right Rotation 64, pain on left  yes      Left Rotation 62 yes      Right Side Bending 42, stretch pain on left  yes   Left Side Bending 42, pain on left near lower cervical spine Yes (worse)        Cervical joint mobility: Limited worst in left SB and rotation, limited in all planes        Thoracic mobility: Limited with significant TOP on SP T3-6     Palpation: TOP levator scap, UFT, sub occipitals, paraspinals         Treatment     Wendy received the treatments listed below:      therapeutic exercises to develop strength, endurance, ROM, flexibility and posture for 00 minutes including:    Upper trap stretch 3 x 20" " "hold   Levator scap stretch 3 x 30" hold   Scapular retractions 2 x 10   B shoulder no money (no resistance) 2 x 10   Supine chin tucks x 20 3"  Supine pec stretch on half foam roll x 2 min   Side lying open books x 10, 5" hold  Shoulder extension GTB 2 x 10   Shoulder rows GTB 2 x 10       manual therapy techniques: Soft tissue Mobilization were applied for 38 minutes, including:  Patient consented to trigger point dry needling with electrical stimulation on this date. 70% isopropyl alcohol wipe down performed to treatment site with single use sterile prep pads.TDN with electrical stimulation to bilateral suboccipital muscles using 0.30 x 40 Seirin J Type Needles and C3 multifidus muscles x10 minutes at continuous wave frequency. Treatment performed in prone position with intensity set to patient tolerance. All needles were removed. Followed up with gentle STM. TDN without electrical stimulation to right sternocleidomastoid using 0.30 x30 Seirin J Type Needle using pistoning technique. Treatment performed in prone position. Instructed patient to use heat to area, drink fluids (water), and perform HEP and light activity to reduce soreness.         Patient Education and Home Exercises     Home Exercises Provided and Patient Education Provided     Education:  -HEP  -dry needling risks and benefits     Written Home Exercises Provided: Patient instructed to cont prior HEP. Exercises were reviewed and Wendy was able to demonstrate them prior to the end of the session.  Wendy demonstrated good  understanding of the education provided. See EMR under Patient Instructions for exercises provided during therapy sessions    ASSESSMENT     Update: Pt continues with upper cervical dysfunction (C0-C1 and C2-C3) contributing to a decrease in cervical rotation ROM. Did not assess upper extremity strength today but will assess in the future as well as deep cervical neck flexor endurance. She consented to dry needling with " electrical stimulation on this date with localized active trigger points casuing reproduction of symptoms. Will continue to benefit from skilled outpatient physical therapy to address deficits to maximize patient's level of independence.      Previous Short Term Goals Status:   1/4 short term goals met  New Short Term Goals Status:   Progressing towards remaining short term goals  Long Term Goal Status: continue per initial plan of care.  Reasons for Recertification of Therapy:   To decrease frequency of headaches and improve upper cervical mobility        Wendy is progressing well towards her goals.   Pt prognosis is Good.     Pt will continue to benefit from skilled outpatient physical therapy to address the deficits listed in the problem list box on initial evaluation, provide pt/family education and to maximize pt's level of independence in the home and community environment.     Pt's spiritual, cultural and educational needs considered and pt agreeable to plan of care and goals.     Anticipated barriers to physical therapy: none    Goals:   Short Term Goals: 6 weeks  1.Report decreased neck/head pain  <   / =  4  /10  to increase tolerance for ADL's, work activities, and self care. Progressing, 01/05/2022  2. Increase cervical ROM by 5-10 degrees in order to perform ADLs with decreased difficulty. Progressing, 01/05/2022  3. Increase strength in B shoulders/scapular stabilizers by 1/3 MMT grade to increase tolerance for ADL and work activities. Not assessed   4. Pt to tolerate HEP to improve ROM and independence with ADL's. Met, 01/05/2022     Long Term Goals: 10 weeks  1.Report decreased neck/head pain  <   / =  0-1 /10  to increase tolerance for ADL's, work activities, and self care  2. Increase UE/neck flexibility in order to improve posture.    3.Increased strength in B shoulders/scapular stabilizers to >/= 4/5 MMT grade to increase tolerance for ADL and work activities.       PLAN     Updated  Certification Period: extend to 3/31/2022   Recommended Treatment Plan: 2 times per week for 8 weeks:  Cervical/Lumbar Traction, Manual Therapy, Moist Heat/ Ice, Neuromuscular Re-ed, Patient Education, Therapeutic Activities, Therapeutic Exercise and Dry Needling  Other Recommendations: none      I CERTIFY THE NEED FOR THESE SERVICES FURNISHED UNDER THIS PLAN OF TREATMENT AND WHILE UNDER MY CARE  Physician's comments:      Physician's Signature: ___________________________________________________      Awa Langston, PT

## 2022-01-07 ENCOUNTER — CLINICAL SUPPORT (OUTPATIENT)
Dept: REHABILITATION | Facility: HOSPITAL | Age: 42
End: 2022-01-07
Attending: PSYCHIATRY & NEUROLOGY
Payer: COMMERCIAL

## 2022-01-07 DIAGNOSIS — R29.898 DECREASED ROM OF NECK: ICD-10-CM

## 2022-01-07 PROCEDURE — 97140 MANUAL THERAPY 1/> REGIONS: CPT | Mod: PO

## 2022-01-07 PROCEDURE — 97110 THERAPEUTIC EXERCISES: CPT | Mod: PO

## 2022-01-07 NOTE — PROGRESS NOTES
"  [facility]   Physical Therapy Treatment Note     Name: Wendy Munoz  Clinic Number: [id number]    Therapy Diagnosis:   Encounter Diagnosis   Name Primary?    Decreased ROM of neck      Physician: Jose Melendez MD    Visit Date: 1/7/2022    Physician Orders: PT Eval and Treat   Medical Diagnosis from Referral:   G43.719 (ICD-10-CM) - Chronic migraine without aura, intractable, without status migrainosus   M79.18 (ICD-10-CM) - Cervical myofascial pain syndrome         Evaluation Date: 11/29/2021  Authorization Period Expiration: 11/17/2022  Plan of Care Expiration: 3/31/2022  Progress Note Due: 2/05/2022  Visit # / Visits authorized: 7 / 20   FOTO: tbd/ tbd        Precautions: Standard      Time In: 907   Time Out: 950  Total Billable Time:  43 minutes    SUBJECTIVE     Pt reports: that she felt really good after last treatment session. Went for a 3 mile run yesterday resulting in a headache post workout.     She was compliant with home exercise program.  Response to previous treatment: felt fine    Functional change: ongoing     Pain: 4/10  Location: R>L neck      OBJECTIVE     Objective Measures updated at progress report unless specified.     Cervical Range of Motion:     Degrees(%) Pain   Flexion 40 yes      Extension 48 Yes (worse)      Right Rotation 64, pain on left  yes      Left Rotation 62 yes      Right Side Bending  34 yes   Left Side Bending  32 Yes (worse)            Treatment     Wendy received the treatments listed below:      therapeutic exercises to develop strength, endurance, ROM, flexibility and posture for 08 minutes including:    Upper trap stretch 3 x 20" hold   Levator scap stretch 3 x 30" hold   Scapular retractions 2 x 10   B shoulder no money, YTB: 2x10  Serratus wall slides, YTB: 2x8  Supine chin tucks x 20 3"  Supine pec stretch on half foam roll x 2 min   Side lying open books x 10, 5" hold  Shoulder extension GTB 2 x 10   Shoulder rows GTB 2 x " 10       manual therapy techniques: Soft tissue Mobilization were applied for 35 minutes, including:  -TDN with electrical stimulation to right levator scapulae using 0.30 x40 Seirin J Type Needle for 5 minutes at continuous wave frequency with intensity set to patient tolerance. TDN with electrical stimulation to right upper trapezius using 0.30 x 40 Seirin J Type Needle for 5 minutes at continuous wave frequency with intensity set to patient tolerance. Treatment performed in prone position.    -Grade III/IV mobs to T1-T5    Patient Education and Home Exercises     Home Exercises Provided and Patient Education Provided     Education:  -HEP  -dry needling risks and benefits     Written Home Exercises Provided: yes. Exercises were reviewed and Wendy was able to demonstrate them prior to the end of the session.  Wendy demonstrated good  understanding of the education provided. See EMR under Patient Instructions for exercises provided during therapy sessions    ASSESSMENT     Pt reports no cervical pain but does have increased muscle tension in right levator scapulae and upper trapezius. History of right shoulder injury which was treated with conservative management. She tolerated TDN well with no adverse effects. Followed up manual interventions with heat to cervical spine. Instructed patient in brueggers and serratus wall slides to improve scapular strength. She was given yellow resistance band.         Wendy is progressing well towards her goals.   Pt prognosis is Good.     Pt will continue to benefit from skilled outpatient physical therapy to address the deficits listed in the problem list box on initial evaluation, provide pt/family education and to maximize pt's level of independence in the home and community environment.     Pt's spiritual, cultural and educational needs considered and pt agreeable to plan of care and goals.     Anticipated barriers to physical therapy: none    Goals:   Short Term Goals: 6  weeks  1.Report decreased neck/head pain  <   / =  4  /10  to increase tolerance for ADL's, work activities, and self care. Progressing, 01/07/2022  2. Increase cervical ROM by 5-10 degrees in order to perform ADLs with decreased difficulty. Progressing, 01/07/2022  3. Increase strength in B shoulders/scapular stabilizers by 1/3 MMT grade to increase tolerance for ADL and work activities. Not assessed   4. Pt to tolerate HEP to improve ROM and independence with ADL's. Met, 01/07/2022     Long Term Goals: 10 weeks  1.Report decreased neck/head pain  <   / =  0-1 /10  to increase tolerance for ADL's, work activities, and self care  2. Increase UE/neck flexibility in order to improve posture.    3.Increased strength in B shoulders/scapular stabilizers to >/= 4/5 MMT grade to increase tolerance for ADL and work activities.       PLAN     Serratus and middle/lower trap strengthening     Awa Langston, PT

## 2022-01-13 ENCOUNTER — CLINICAL SUPPORT (OUTPATIENT)
Dept: REHABILITATION | Facility: HOSPITAL | Age: 42
End: 2022-01-13
Attending: PSYCHIATRY & NEUROLOGY
Payer: COMMERCIAL

## 2022-01-13 DIAGNOSIS — R29.898 DECREASED ROM OF NECK: ICD-10-CM

## 2022-01-13 PROCEDURE — 97140 MANUAL THERAPY 1/> REGIONS: CPT | Mod: PO

## 2022-01-13 PROCEDURE — 97110 THERAPEUTIC EXERCISES: CPT | Mod: PO

## 2022-01-13 NOTE — PROGRESS NOTES
"  [facility]   Physical Therapy Treatment Note     Name: Wendy Munoz  Clinic Number: [id number]    Therapy Diagnosis:   Encounter Diagnosis   Name Primary?    Decreased ROM of neck      Physician: Jose Melendez MD    Visit Date: 1/13/2022    Physician Orders: PT Eval and Treat   Medical Diagnosis from Referral:   G43.719 (ICD-10-CM) - Chronic migraine without aura, intractable, without status migrainosus   M79.18 (ICD-10-CM) - Cervical myofascial pain syndrome         Evaluation Date: 11/29/2021  Authorization Period Expiration: 11/17/2022  Plan of Care Expiration: 3/31/2022  Progress Note Due: 2/05/2022  Visit # / Visits authorized: 7 / 20   FOTO: tbd/ tbd        Precautions: Standard      Time In: 205  Time Out: 245  Total Billable Time:  40 minutes    SUBJECTIVE     Pt reports: that she felt really good after last treatment session with relief up to 4 days.     She was compliant with home exercise program.  Response to previous treatment: felt fine    Functional change: ongoing     Pain: 4/10  Location: R>L neck      OBJECTIVE     Objective Measures updated at progress report unless specified.           Treatment     Wendy received the treatments listed below:      therapeutic exercises to develop strength, endurance, ROM, flexibility and posture for 15 minutes including:    B shoulder no money, YTB: 2x10  Serratus wall slides, YTB: 2x8  IR/ER with YTB: 2x12  Prone chin tuck with cervical lift: 2x10, 3"  Supine chin tucks x 20 3"  Supine pec stretch on half foam roll x 2 min   Side lying open books x 10, 5" hold  Shoulder extension GTB 2 x 10   Shoulder rows GTB 2 x 10       manual therapy techniques: Soft tissue Mobilization were applied for 35 minutes, including:  -Grade III upglides to left C4-C5  -Grade IV downglides to left C6  -Inferior force to left 1st rib  -Oscillations to left 2nd-3rd rib  -suboccipital release       Patient Education and Home Exercises "     Home Exercises Provided and Patient Education Provided     Education:  -HEP  -theracane and self release to levator scapulae, upper trapezius, and suboccipital muscles     Written Home Exercises Provided: Patient instructed to cont prior HEP. Exercises were reviewed and Wendy was able to demonstrate them prior to the end of the session.  Wendy demonstrated good  understanding of the education provided. See EMR under Patient Instructions for exercises provided during therapy sessions    ASSESSMENT      Wendy presents with decreased joint mobility in mid to lower cervical spine with increased muscle tightness in left levator scapulae with palpable trigger points. She also displays decreased scapular upward rotation bilaterally with increased UT compensation during serratus wall slides which improved with facilitation to scapula. Facilitated cervical mobs with prone chin tuck with head lift for stabilization. Education on self-myofascial release to suboccipitals and levator scapulae using theracane and theraballs.       Wendy is progressing well towards her goals.   Pt prognosis is Good.     Pt will continue to benefit from skilled outpatient physical therapy to address the deficits listed in the problem list box on initial evaluation, provide pt/family education and to maximize pt's level of independence in the home and community environment.     Pt's spiritual, cultural and educational needs considered and pt agreeable to plan of care and goals.     Anticipated barriers to physical therapy: none    Goals:   Short Term Goals: 6 weeks  1.Report decreased neck/head pain  <   / =  4  /10  to increase tolerance for ADL's, work activities, and self care. Progressing, 01/13/2022  2. Increase cervical ROM by 5-10 degrees in order to perform ADLs with decreased difficulty. Progressing, 01/13/2022  3. Increase strength in B shoulders/scapular stabilizers by 1/3 MMT grade to increase tolerance for ADL and work  activities. Not assessed   4. Pt to tolerate HEP to improve ROM and independence with ADL's. Met, 01/13/2022     Long Term Goals: 10 weeks  1.Report decreased neck/head pain  <   / =  0-1 /10  to increase tolerance for ADL's, work activities, and self care  2. Increase UE/neck flexibility in order to improve posture.    3.Increased strength in B shoulders/scapular stabilizers to >/= 4/5 MMT grade to increase tolerance for ADL and work activities.       PLAN     Continue scapular and cervical strengthening , update HEP, thoracic mobility     Awa Langston, PT

## 2022-01-14 ENCOUNTER — OFFICE VISIT (OUTPATIENT)
Dept: DERMATOLOGY | Facility: CLINIC | Age: 42
End: 2022-01-14
Payer: COMMERCIAL

## 2022-01-14 DIAGNOSIS — D22.9 MULTIPLE BENIGN NEVI: ICD-10-CM

## 2022-01-14 DIAGNOSIS — Z12.83 SKIN CANCER SCREENING: Primary | ICD-10-CM

## 2022-01-14 DIAGNOSIS — L82.1 SK (SEBORRHEIC KERATOSIS): ICD-10-CM

## 2022-01-14 PROCEDURE — 99213 OFFICE O/P EST LOW 20 MIN: CPT | Mod: S$GLB,,, | Performed by: DERMATOLOGY

## 2022-01-14 PROCEDURE — 99213 PR OFFICE/OUTPT VISIT, EST, LEVL III, 20-29 MIN: ICD-10-PCS | Mod: S$GLB,,, | Performed by: DERMATOLOGY

## 2022-01-14 PROCEDURE — 1159F MED LIST DOCD IN RCRD: CPT | Mod: CPTII,S$GLB,, | Performed by: DERMATOLOGY

## 2022-01-14 PROCEDURE — 1160F PR REVIEW ALL MEDS BY PRESCRIBER/CLIN PHARMACIST DOCUMENTED: ICD-10-PCS | Mod: CPTII,S$GLB,, | Performed by: DERMATOLOGY

## 2022-01-14 PROCEDURE — 99999 PR PBB SHADOW E&M-EST. PATIENT-LVL III: ICD-10-PCS | Mod: PBBFAC,,, | Performed by: DERMATOLOGY

## 2022-01-14 PROCEDURE — 1160F RVW MEDS BY RX/DR IN RCRD: CPT | Mod: CPTII,S$GLB,, | Performed by: DERMATOLOGY

## 2022-01-14 PROCEDURE — 99999 PR PBB SHADOW E&M-EST. PATIENT-LVL III: CPT | Mod: PBBFAC,,, | Performed by: DERMATOLOGY

## 2022-01-14 PROCEDURE — 1159F PR MEDICATION LIST DOCUMENTED IN MEDICAL RECORD: ICD-10-PCS | Mod: CPTII,S$GLB,, | Performed by: DERMATOLOGY

## 2022-01-14 NOTE — PATIENT INSTRUCTIONS
Sunscreen Recommendations:    Recommend daily sun protection/avoidance and use of at least SPF 30, broad spectrum sunscreen.     Based on a recent study (2021) and out of an abundance of caution, we are recommending that you AVOID the followin. Spray and gel sunscreens  2. Any [facility] or [facility] brands as well as Max Block and TopCare brands   3. Neutrogena Ultra Sheer Dry-touch Water Resistant Suncscreen LOTION SPF 70   4. Neutrogena Sheer Zinc Dry-touch Face Sunscreen LOTION SPF 50   5.   Aveeno Baby Continuous Protection Sensitive Skin Sunscreen LOTION - Broad Spectrum SPF 50    Please note that Benzene is not an ingredient or the degradation product of any ingredient in any sunscreen. This study suggested that the findings are a result of contamination in the manufacturing process. At this point, we don't know how effectively Benzene gets through the skin, if it gets absorbed systemically, and what effects that may have.     We do know that ultraviolet radiation is a well-established carcinogen. Please use daily sun protection/avoidance and use of at least SPF 30, broad-spectrum sunscreen not listed above.

## 2022-01-14 NOTE — PROGRESS NOTES
Subjective:       Patient ID:  Wendy Munoz is a 42 y.o. female who presents for   Chief Complaint   Patient presents with    Skin Check     Tbse     HPI   Interested in total body skin check today.  H/o BCC right clavicle s/p ED&C 03/2018.  Pt has a lesion on L arm which she noticed about 3 wks ago and is concerned it may be another BCC. Was initially more raised.    Review of Systems   Constitutional: Negative.  Negative for fever and chills.   Skin: Negative for daily sunscreen use and activity-related sunscreen use.   Hematologic/Lymphatic: Does not bruise/bleed easily.        Objective:    Physical Exam   Constitutional: She appears well-developed and well-nourished. No distress.   Genitourinary:         Neurological: She is alert and oriented to person, place, and time. She is not disoriented.   Psychiatric: She has a normal mood and affect.   Skin:   Areas Examined (abnormalities noted in diagram):   Scalp / Hair Palpated and Inspected  Head / Face Inspection Performed  Neck Inspection Performed  Chest / Axilla Inspection Performed  Abdomen Inspection Performed  Genitals / Buttocks / Groin Inspection Performed  Back Inspection Performed  RUE Inspected  LUE Inspection Performed  RLE Inspected  LLE Inspection Performed  Nails and Digits Inspection Performed              Diagram Legend     Erythematous scaling macule/papule c/w actinic keratosis       Vascular papule c/w angioma      Pigmented verrucoid papule/plaque c/w seborrheic keratosis      Yellow umbilicated papule c/w sebaceous hyperplasia      Irregularly shaped tan macule c/w lentigo     1-2 mm smooth white papules consistent with Milia      Movable subcutaneous cyst with punctum c/w epidermal inclusion cyst      Subcutaneous movable cyst c/w pilar cyst      Firm pink to brown papule c/w dermatofibroma      Pedunculated fleshy papule(s) c/w skin tag(s)      Evenly pigmented macule c/w junctional nevus     Mildly variegated pigmented, slightly  irregular-bordered macule c/w mildly atypical nevus      Flesh colored to evenly pigmented papule c/w intradermal nevus       Pink pearly papule/plaque c/w basal cell carcinoma      Erythematous hyperkeratotic cursted plaque c/w SCC      Surgical scar with no sign of skin cancer recurrence      Open and closed comedones      Inflammatory papules and pustules      Verrucoid papule consistent consistent with wart     Erythematous eczematous patches and plaques     Dystrophic onycholytic nail with subungual debris c/w onychomycosis     Umbilicated papule    Erythematous-base heme-crusted tan verrucoid plaque consistent with inflamed seborrheic keratosis     Erythematous Silvery Scaling Plaque c/w Psoriasis     See annotation      Assessment / Plan:        Skin cancer screening  Total body skin examination performed today including at least 12 points as noted in physical examination. No lesions suspicious for malignancy noted.  Patient instructed in importance of daily broad spectrum sunscreen use with spf at least 30. Sun avoidance and topical protection/protective clothing discussed.    Multiple benign nevi  Benign-appearing nevi present on exam today. Reassurance provided. Periodically examine moles and return to clinic if any moles change or become symptomatic (bleeding, itching, pain, etc).    SK (seborrheic keratosis)  These are benign inherited growths without a malignant potential. Reassurance given to patient. No treatment is necessary.   Treatment of benign, asymptomatic lesions may be considered cosmetic.  Warned about risk of hypo- or hyperpigmentation with treatment and risk of recurrence.    Follow up in about 1 year (around 1/14/2023) for skin check or sooner for any concerns.

## 2022-01-25 ENCOUNTER — CLINICAL SUPPORT (OUTPATIENT)
Dept: REHABILITATION | Facility: HOSPITAL | Age: 42
End: 2022-01-25
Attending: PSYCHIATRY & NEUROLOGY
Payer: COMMERCIAL

## 2022-01-25 DIAGNOSIS — R29.898 DECREASED ROM OF NECK: ICD-10-CM

## 2022-01-25 PROCEDURE — 97140 MANUAL THERAPY 1/> REGIONS: CPT | Mod: PO

## 2022-01-25 NOTE — PROGRESS NOTES
"  [facility]   Physical Therapy Treatment Note     Name: Wendy Munoz  Clinic Number: [id number]    Therapy Diagnosis:   Encounter Diagnosis   Name Primary?    Decreased ROM of neck      Physician: Jose Melendez MD    Visit Date: 1/25/2022    Physician Orders: PT Eval and Treat   Medical Diagnosis from Referral:   G43.719 (ICD-10-CM) - Chronic migraine without aura, intractable, without status migrainosus   M79.18 (ICD-10-CM) - Cervical myofascial pain syndrome         Evaluation Date: 11/29/2021  Authorization Period Expiration: 6/01/2022  Plan of Care Expiration: 3/31/2022  Progress Note Due: 2/05/2022  Visit # / Visits authorized: 4 / 20 (new auth)  FOTO: tbd/ tbd        Precautions: Standard      Time In: 240 (arrived late)  Time Out: 335  Total Billable Time:  55 minutes      SUBJECTIVE     Pt reports: that she had a migraine for 3 days last week.     She was compliant with home exercise program.  Response to previous treatment: felt fine    Functional change: ongoing     Pain: 4/10  Location: R>L neck      OBJECTIVE     Objective Measures updated at progress report unless specified.       Treatment     Wendy received the treatments listed below:      therapeutic exercises to develop strength, endurance, ROM, flexibility and posture for 00 minutes including:    B shoulder no money, YTB: 2x10  Serratus wall slides, YTB: 2x8  IR/ER with YTB: 2x12  Prone chin tuck with cervical lift: 2x10, 3"  Supine chin tucks x 20 3"  Supine pec stretch on half foam roll x 2 min   Side lying open books x 10, 5" hold  Shoulder extension GTB 2 x 10   Shoulder rows GTB 2 x 10       manual therapy techniques: Soft tissue Mobilization and Dry Needling were applied for 55 minutes, including:    -TDN without ES to right upper trapezius using 0.30x40 Seirin J Type Needle with patient in prone position.   -ES with TDN x5 minutes at continuous wave frequency to right and left suboccipital muscles. " TDN to left C3-C4 multifidus/rotatore muscles for x5 minutes at continuous wave frequency.Treatment performed in prone position with intensity set to patient tolerance. TDN performed to reduce pain and muscle tension, promote blood flow, and improve ROM and function. Pt tolerated tx well without adverse effects. She was educated on what to expect following the procedure and verbalized understanding.  -MFR to left and right masseter muscles and STM to lateral pterygoid muscles         Patient Education and Home Exercises     Home Exercises Provided and Patient Education Provided     Education:  -HEP  -theracane and self release to levator scapulae, upper trapezius, and suboccipital muscles     Written Home Exercises Provided: Patient instructed to cont prior HEP. Exercises were reviewed and Wendy was able to demonstrate them prior to the end of the session.  Wendy demonstrated good  understanding of the education provided. See EMR under Patient Instructions for exercises provided during therapy sessions    ASSESSMENT      Wendy tolerated dry needling with ES well with decreased muscular tension post treatment session. She also presents with temporomandibular dysfunction and reports clenching/grinding which can also be contributing to patient's migraines.         Wendy is progressing well towards her goals.   Pt prognosis is Good.     Pt will continue to benefit from skilled outpatient physical therapy to address the deficits listed in the problem list box on initial evaluation, provide pt/family education and to maximize pt's level of independence in the home and community environment.     Pt's spiritual, cultural and educational needs considered and pt agreeable to plan of care and goals.     Anticipated barriers to physical therapy: none    Goals:   Short Term Goals: 6 weeks  1.Report decreased neck/head pain  <   / =  4  /10  to increase tolerance for ADL's, work activities, and self care. Progressing,  01/26/2022  2. Increase cervical ROM by 5-10 degrees in order to perform ADLs with decreased difficulty. Progressing, 01/26/2022  3. Increase strength in B shoulders/scapular stabilizers by 1/3 MMT grade to increase tolerance for ADL and work activities. Not assessed   4. Pt to tolerate HEP to improve ROM and independence with ADL's. Met, 01/26/2022     Long Term Goals: 10 weeks  1.Report decreased neck/head pain  <   / =  0-1 /10  to increase tolerance for ADL's, work activities, and self care  2. Increase UE/neck flexibility in order to improve posture.    3.Increased strength in B shoulders/scapular stabilizers to >/= 4/5 MMT grade to increase tolerance for ADL and work activities.       PLAN     Continue scapular and cervical strengthening , update HEP, thoracic mobility     Awa Langston, PT

## 2022-01-28 ENCOUNTER — CLINICAL SUPPORT (OUTPATIENT)
Dept: REHABILITATION | Facility: HOSPITAL | Age: 42
End: 2022-01-28
Attending: PSYCHIATRY & NEUROLOGY
Payer: COMMERCIAL

## 2022-01-28 DIAGNOSIS — R29.898 DECREASED ROM OF NECK: ICD-10-CM

## 2022-01-28 PROCEDURE — 97140 MANUAL THERAPY 1/> REGIONS: CPT | Mod: PO

## 2022-01-28 PROCEDURE — 97110 THERAPEUTIC EXERCISES: CPT | Mod: PO

## 2022-01-28 NOTE — PROGRESS NOTES
"  [facility]   Physical Therapy Treatment Note     Name: Wendy Munoz  Clinic Number: [id number]    Therapy Diagnosis:   Encounter Diagnosis   Name Primary?    Decreased ROM of neck      Physician: Jose Melendez MD    Visit Date: 1/28/2022    Physician Orders: PT Eval and Treat   Medical Diagnosis from Referral:   G43.719 (ICD-10-CM) - Chronic migraine without aura, intractable, without status migrainosus   M79.18 (ICD-10-CM) - Cervical myofascial pain syndrome         Evaluation Date: 11/29/2021  Authorization Period Expiration: 6/01/2022  Plan of Care Expiration: 3/31/2022  Progress Note Due: 2/05/2022  Visit # / Visits authorized: 4 / 20 (new auth)  FOTO: tbd/ tbd        Precautions: Standard      Time In: 315  Time Out: 400  Total Billable Time:  45 minutes      SUBJECTIVE     Pt reports: that she had a migraine for 3 days last week.     She was compliant with home exercise program.  Response to previous treatment: felt fine    Functional change: ongoing     Pain: 4/10  Location: R>L neck      OBJECTIVE     Objective Measures updated at progress report unless specified.       Treatment     Wendy received the treatments listed below:      therapeutic exercises to develop strength, endurance, ROM, flexibility and posture for 15 minutes including:    B shoulder no money, YTB: 2x10  Serratus wall slides, YTB: 2x8  IR/ER with YTB: 2x12  Prone chin tuck with cervical lift: 2x10, 3"  Supine Y's with YTB: 3x10  Supine chin tucks x 20 3"  Foam roll series: 10x10"each  Side lying open books x 10, 5" hold  Shoulder extension GTB 2 x 10   Shoulder rows GTB 2 x 10       manual therapy techniques: Soft tissue Mobilization, Joint Mobilizations, and Dry Needling were applied for 30 minutes, including:    -TDN without ES to right C3-C6 multifidus/rotatore muscles with patient positioned prone using 0.30x40 Seirin J Type Needle. After third needle was inserted, patient reporting increased " "perspiration and pain. Asked to remove needles. Needles were removed. Followed up with STM and MFR using cupping.   -Prone Grade IV UPA right C3-C4 mobs: 3x30"  -MFR to right pectoralis major and minor and levator scapulae         Patient Education and Home Exercises     Home Exercises Provided and Patient Education Provided     Education:  -HEP  -theracane and self release to levator scapulae, upper trapezius, and suboccipital muscles     Written Home Exercises Provided: Patient instructed to cont prior HEP. Exercises were reviewed and Wendy was able to demonstrate them prior to the end of the session.  Wendy demonstrated good  understanding of the education provided. See EMR under Patient Instructions for exercises provided during therapy sessions    ASSESSMENT        Wendy arrived to session with no cervical pain or onset of headaches since previous treatment session. She did report N/T near left levator scapulae while working. She does have increased muscular tension in cervical paraspinal and periscapular muscles with cervical rotational deficit. Unable to tolerate dry needling today with needles removed and following up intervention with cupping. Good effect with petechiae formation.         Wendy is progressing well towards her goals.   Pt prognosis is Good.     Pt will continue to benefit from skilled outpatient physical therapy to address the deficits listed in the problem list box on initial evaluation, provide pt/family education and to maximize pt's level of independence in the home and community environment.     Pt's spiritual, cultural and educational needs considered and pt agreeable to plan of care and goals.     Anticipated barriers to physical therapy: none    Goals:   Short Term Goals: 6 weeks  1.Report decreased neck/head pain  <   / =  4  /10  to increase tolerance for ADL's, work activities, and self care. Progressing, 01/28/2022  2. Increase cervical ROM by 5-10 degrees in order to perform " ADLs with decreased difficulty. Progressing, 01/28/2022  3. Increase strength in B shoulders/scapular stabilizers by 1/3 MMT grade to increase tolerance for ADL and work activities. Not assessed   4. Pt to tolerate HEP to improve ROM and independence with ADL's. Met, 01/28/2022     Long Term Goals: 10 weeks  1.Report decreased neck/head pain  <   / =  0-1 /10  to increase tolerance for ADL's, work activities, and self care  2. Increase UE/neck flexibility in order to improve posture.    3.Increased strength in B shoulders/scapular stabilizers to >/= 4/5 MMT grade to increase tolerance for ADL and work activities.       PLAN     Continue scapular and cervical strengthening , update HEP, thoracic mobility     Awa Langston, PT

## 2022-01-31 ENCOUNTER — PATIENT MESSAGE (OUTPATIENT)
Dept: REHABILITATION | Facility: HOSPITAL | Age: 42
End: 2022-01-31
Payer: COMMERCIAL

## 2022-02-01 ENCOUNTER — PATIENT MESSAGE (OUTPATIENT)
Dept: PAIN MEDICINE | Facility: CLINIC | Age: 42
End: 2022-02-01
Payer: COMMERCIAL

## 2022-02-02 ENCOUNTER — TELEPHONE (OUTPATIENT)
Dept: PAIN MEDICINE | Facility: CLINIC | Age: 42
End: 2022-02-02

## 2022-02-02 ENCOUNTER — OFFICE VISIT (OUTPATIENT)
Dept: PAIN MEDICINE | Facility: CLINIC | Age: 42
End: 2022-02-02
Attending: ANESTHESIOLOGY
Payer: COMMERCIAL

## 2022-02-02 VITALS
WEIGHT: 153 LBS | DIASTOLIC BLOOD PRESSURE: 76 MMHG | HEART RATE: 70 BPM | BODY MASS INDEX: 22.66 KG/M2 | RESPIRATION RATE: 18 BRPM | SYSTOLIC BLOOD PRESSURE: 113 MMHG | HEIGHT: 69 IN

## 2022-02-02 DIAGNOSIS — M62.838 MUSCLE SPASM: ICD-10-CM

## 2022-02-02 DIAGNOSIS — G24.3 ISOLATED CERVICAL DYSTONIA: Primary | ICD-10-CM

## 2022-02-02 DIAGNOSIS — G43.119 INTRACTABLE MIGRAINE WITH AURA WITHOUT STATUS MIGRAINOSUS: ICD-10-CM

## 2022-02-02 PROCEDURE — 1159F MED LIST DOCD IN RCRD: CPT | Mod: CPTII,S$GLB,, | Performed by: ANESTHESIOLOGY

## 2022-02-02 PROCEDURE — 64616 CHEMODENERV MUSC NECK DYSTON: CPT | Mod: 50,S$GLB,, | Performed by: ANESTHESIOLOGY

## 2022-02-02 PROCEDURE — 64616 PR CHEMODENERVATION NECK MUSCLES EXC LARNYNX, UNI: ICD-10-PCS | Mod: 50,S$GLB,, | Performed by: ANESTHESIOLOGY

## 2022-02-02 PROCEDURE — 3074F SYST BP LT 130 MM HG: CPT | Mod: CPTII,S$GLB,, | Performed by: ANESTHESIOLOGY

## 2022-02-02 PROCEDURE — 1160F RVW MEDS BY RX/DR IN RCRD: CPT | Mod: CPTII,S$GLB,, | Performed by: ANESTHESIOLOGY

## 2022-02-02 PROCEDURE — 3008F PR BODY MASS INDEX (BMI) DOCUMENTED: ICD-10-PCS | Mod: CPTII,S$GLB,, | Performed by: ANESTHESIOLOGY

## 2022-02-02 PROCEDURE — 3074F PR MOST RECENT SYSTOLIC BLOOD PRESSURE < 130 MM HG: ICD-10-PCS | Mod: CPTII,S$GLB,, | Performed by: ANESTHESIOLOGY

## 2022-02-02 PROCEDURE — 99499 NO LOS: ICD-10-PCS | Mod: S$GLB,,, | Performed by: ANESTHESIOLOGY

## 2022-02-02 PROCEDURE — 3008F BODY MASS INDEX DOCD: CPT | Mod: CPTII,S$GLB,, | Performed by: ANESTHESIOLOGY

## 2022-02-02 PROCEDURE — 3078F PR MOST RECENT DIASTOLIC BLOOD PRESSURE < 80 MM HG: ICD-10-PCS | Mod: CPTII,S$GLB,, | Performed by: ANESTHESIOLOGY

## 2022-02-02 PROCEDURE — 3078F DIAST BP <80 MM HG: CPT | Mod: CPTII,S$GLB,, | Performed by: ANESTHESIOLOGY

## 2022-02-02 PROCEDURE — 95874 GUIDE NERV DESTR NEEDLE EMG: CPT | Mod: S$GLB,,, | Performed by: ANESTHESIOLOGY

## 2022-02-02 PROCEDURE — 64612 DESTROY NERVE FACE MUSCLE: CPT | Mod: S$GLB,,, | Performed by: ANESTHESIOLOGY

## 2022-02-02 PROCEDURE — 99999 PR PBB SHADOW E&M-EST. PATIENT-LVL III: ICD-10-PCS | Mod: PBBFAC,,, | Performed by: ANESTHESIOLOGY

## 2022-02-02 PROCEDURE — 1159F PR MEDICATION LIST DOCUMENTED IN MEDICAL RECORD: ICD-10-PCS | Mod: CPTII,S$GLB,, | Performed by: ANESTHESIOLOGY

## 2022-02-02 PROCEDURE — 64612 PR DEST,NERVE,FACIAL: ICD-10-PCS | Mod: S$GLB,,, | Performed by: ANESTHESIOLOGY

## 2022-02-02 PROCEDURE — 99499 UNLISTED E&M SERVICE: CPT | Mod: S$GLB,,, | Performed by: ANESTHESIOLOGY

## 2022-02-02 PROCEDURE — 99999 PR PBB SHADOW E&M-EST. PATIENT-LVL III: CPT | Mod: PBBFAC,,, | Performed by: ANESTHESIOLOGY

## 2022-02-02 PROCEDURE — 95874 PR NEEDLE EMG GUIDANCE FOR CHEMODENERVATION: ICD-10-PCS | Mod: S$GLB,,, | Performed by: ANESTHESIOLOGY

## 2022-02-02 PROCEDURE — 1160F PR REVIEW ALL MEDS BY PRESCRIBER/CLIN PHARMACIST DOCUMENTED: ICD-10-PCS | Mod: CPTII,S$GLB,, | Performed by: ANESTHESIOLOGY

## 2022-02-02 NOTE — PROGRESS NOTES
Subjective:      Patient ID: Wendy Munoz is a 42 y.o. female.    Chief Complaint: No chief complaint on file.    Referred by: No ref. provider found     HPI  Wendy Munoz presents to clinic for evaluation of headaches.   Last seen 11/9/2021 for botox injections for cervical dystonia. Continues PT and think it is beneficial but does have soreness after. Not really taking baclofen, she says she sis not think it helped her pain and made her sedated. Today she reports her headaches are grossly unchanged from the last visit. She continues with upper back and neck pain daily, worse with operating and when at the computer. She reports neck and back pain precede her migraines by 1-2 days. Has to Maxalt often. Her pain Better with lying flat on her back and does feel better after PT. Stopped Excedrin due to stomach ulcers. She reports that the botox was helpful in alleviated some of her pain, but that she also developed new pain after the botox injections that was different in character and located in the middle of her neck and made worse with cervical flexion.     Pain Medications:  Current:  None     Tried in Past:  NSAIDs -Excedrin migraine  TCA -Never  SNRI -Never  Anti-convulsants -Never  Muscle Relaxants -Never  Opioids- None     Physical Therapy/Home Exercise: Yes      report: reviewed and consistent.      Pain Procedures:   None     Chiropractor -never  Acupuncture - never  TENS unit -never  Relevant surgery - none  Joint replacement - none     Imaging:  Past Medical History:   Diagnosis Date    Asthma 2001    Exercise induced    Basal cell carcinoma     Migraine        Past Surgical History:   Procedure Laterality Date    ABDOMINAL SURGERY  9/3015    Umbilical, bilateral inguinal  hernia repair    HERNIA REPAIR      umbilical, inguinal hernia repair    HYSTEROSCOPY  11/2011    Hysteroscopy polypectomy    HYSTEROSCOPY W/ POLYPECTOMY      PELVIC LAPAROSCOPY  12/2016    IUD removal     REMOVAL OF INTRAUTERINE DEVICE (IUD)      laparoscopy for perforated IUD    SKIN BIOPSY         Review of patient's allergies indicates:   Allergen Reactions    Latex, natural rubber      Rash/itching       Current Outpatient Medications   Medication Sig Dispense Refill    fish oil-omega-3 fatty acids 300-1,000 mg capsule Take by mouth once daily.      magnesium 250 mg Tab Take by mouth once.      multivitamin (THERAGRAN) per tablet Take 1 tablet by mouth once daily.      tranexamic acid (LYSTEDA) 650 mg tablet Take 1,300 mg by mouth 3 (three) times daily.      albuterol (PROAIR HFA) 90 mcg/actuation inhaler Inhale 2 puffs into the lungs every 6 (six) hours as needed for Wheezing. Rescue 18 g 1    baclofen (LIORESAL) 10 MG tablet Take 0.5 tablets (5 mg total) by mouth 3 (three) times daily. 90 tablet 1    drospirenone, contraceptive, (SLYND) 4 mg (28) Tab Take 1 tablet (4 mg total) by mouth once daily. 90 tablet 3    rizatriptan (MAXALT) 10 MG tablet Take 1 tablet (10 mg total) by mouth as needed for Migraine (May repeat after 2 hours x 2, if needed.  Not to exceed 30mg/24 hours). 27 tablet 3     Current Facility-Administered Medications   Medication Dose Route Frequency Provider Last Rate Last Admin    albuterol inhaler 2 puff  2 puff Inhalation Q20 Min PRN Yasir Callahan MD           Family History   Problem Relation Age of Onset    Breast cancer Mother 54        No genetic testing    Hypertension Father     Prostate cancer Father     Cancer Father         Prostate age 50    Hyperlipidemia Father     Melanoma Neg Hx        Social History     Socioeconomic History    Marital status:    Tobacco Use    Smoking status: Never Smoker    Smokeless tobacco: Never Used   Substance and Sexual Activity    Alcohol use: Yes     Alcohol/week: 1.0 standard drink     Types: 1 Glasses of wine per week     Comment: few times a week    Drug use: Never    Sexual activity: Yes     Partners: Male      "Birth control/protection: Partner-Vasectomy   Other Topics Concern    Are you pregnant or think you may be? No    Breast-feeding No           Review of Systems   Constitutional: Negative for fever and weight loss.   Cardiovascular: Negative for chest pain and palpitations.   Respiratory: Negative for cough and shortness of breath.    Musculoskeletal: Positive for muscle cramps, neck pain and stiffness. Negative for back pain and joint pain.   Gastrointestinal: Negative for abdominal pain and bowel incontinence.   Genitourinary: Negative for bladder incontinence and dysuria.   Neurological: Positive for headaches. Negative for focal weakness.           Objective:   /76 (BP Location: Right arm, Patient Position: Sitting, BP Method: Small (Automatic))   Pulse 70   Resp 18   Ht 5' 9" (1.753 m)   Wt 69.4 kg (153 lb)   BMI 22.59 kg/m²   Pain Disability Index Review:  Last 3 PDI Scores 2/2/2022 11/9/2021 11/1/2021   Pain Disability Index (PDI) 10 13 13     Normocephalic.  Atraumatic.  Affect appropriate.  Breathing unlabored.  Extra ocular muscles intact.           General    Constitutional: She is oriented to person, place, and time. She appears well-developed and well-nourished.   HENT:   Head: Normocephalic and atraumatic.   Eyes: EOM are normal.   Pulmonary/Chest: Effort normal. No respiratory distress.   Abdominal: She exhibits no distension.   Neurological: She is alert and oriented to person, place, and time.   Psychiatric: She has a normal mood and affect. Her behavior is normal.         Back (L-Spine & T-Spine) / Neck (C-Spine) Exam     Tenderness   The patient is tender to palpation of the right trapezial and left trapezial. Right paramedian tenderness of the Upper T-Spine and Lower C-Spine. Left paramedian tenderness of the Lower C-Spine and Upper T-Spine.     Neck (C-Spine) Range of Motion   Flexion:     Normal  Extension: NormalLimited extension numerical scale: pain.  Right Lateral Bend: " normal  Left Lateral Bend: normal  Right Rotation: normalNeck rotation right: limited by pain.  Left Rotation: normalNeck rotation left: limited by pain.    Spinal Sensation   Right Side Sensation  C-Spine Level: normal   Left Side Sensation  C-Spine Level: normal  L-Spine Level: dysesthesia    Neck (C-Spine) Tests   Spurling's Test   Left:  Negative  Right: negative      Muscle Strength   Right Upper Extremity   Biceps: 5/5   Deltoid:  5/5  Triceps:  5/5  Wrist extension: 5/5   Finger Flexors:  5/5  Left Upper Extremity  Biceps: 5/5   Deltoid:  5/5  Triceps:  5/5  Wrist extension: 5/5   Finger Flexors:  5/5    Reflexes     Left Side  Biceps:  3+  Triceps:  3+  Brachioradialis:  3+  Left Spain's Sign:  Absent  Ankle Clonus:  absent    Right Side   Biceps:  3+  Triceps:  3+  Brachioradialis:  3+  Right [place]'s Sign:  absent  Ankle Clonus:  absent        Assessment:       Encounter Diagnoses   Name Primary?    Isolated cervical dystonia Yes    Muscle spasm     Intractable migraine with aura without status migrainosus          Plan:   We discussed with the patient the assessment and recommendations. The following is the plan we agreed on:    Treatment Plan:   - PT/OT/HEP: Discussed benefits of exercise for pain.   - Procedures: Botox injection done today to cervical musculature. Increased botox to 250 units. 50 units disposed as waste.  - Medications: Continue baclofen 5 mg prn for pain  - Imaging: Reviewed.  - Labs: Reviewed.  Medications are appropriately dosed for current hepatorenal function.  - Misc: Continue with neurology for medical management of headaches    Follow Up: RTC 12 weeks with Dr. Buchanan for botox if pain is reduced with today's injection.     Sander Fall DO   [facility]    Procedure:  Under clean technique, EMG guidance and after discussing with the patient 250 units of Botox were used.  We injected bilateral splenius capitis, longismus, upper trapezius,  as we injected bilateral masseters,  temporalis,  and Upper frontalis.  We also injected the procerus in the midline.  The patient tolerated procedure well.        Diagnoses and all orders for this visit:    Isolated cervical dystonia  -     onabotulinumtoxina injection 300 Units  -     Prior authorization Order    Muscle spasm  -     onabotulinumtoxina injection 300 Units  -     Prior authorization Order    Intractable migraine with aura without status migrainosus  -     onabotulinumtoxina injection 300 Units  -     Prior authorization Order       I have personally taken the history and examined this patient and agree with the fellow's note as stated above.

## 2022-02-04 ENCOUNTER — DOCUMENTATION ONLY (OUTPATIENT)
Dept: REHABILITATION | Facility: HOSPITAL | Age: 42
End: 2022-02-04
Payer: COMMERCIAL

## 2022-02-04 NOTE — PLAN OF CARE
Patient contacted about today's missed appointment.  Reminded patient of next scheduled appointment on 2/14 at 3:00 p.m.

## 2022-02-15 ENCOUNTER — HOSPITAL ENCOUNTER (OUTPATIENT)
Dept: RADIOLOGY | Facility: HOSPITAL | Age: 42
Discharge: HOME | End: 2022-02-15
Attending: ORTHOPAEDIC SURGERY
Payer: COMMERCIAL

## 2022-02-15 ENCOUNTER — OFFICE VISIT (OUTPATIENT)
Dept: ORTHOPEDICS | Facility: CLINIC | Age: 42
End: 2022-02-15
Payer: COMMERCIAL

## 2022-02-15 VITALS — HEIGHT: 69 IN | BODY MASS INDEX: 22.66 KG/M2 | WEIGHT: 153 LBS

## 2022-02-15 DIAGNOSIS — M77.41 METATARSALGIA, RIGHT FOOT: ICD-10-CM

## 2022-02-15 DIAGNOSIS — M79.671 RIGHT FOOT PAIN: ICD-10-CM

## 2022-02-15 DIAGNOSIS — M25.871 SESAMOIDITIS OF RIGHT FOOT: Primary | ICD-10-CM

## 2022-02-15 DIAGNOSIS — R52 PAIN: ICD-10-CM

## 2022-02-15 PROCEDURE — 73630 XR FOOT COMPLETE 3 VIEW RIGHT: ICD-10-PCS | Mod: 26,RT,, | Performed by: RADIOLOGY

## 2022-02-15 PROCEDURE — 73630 X-RAY EXAM OF FOOT: CPT | Mod: 26,RT,, | Performed by: RADIOLOGY

## 2022-02-15 PROCEDURE — 99999 PR PBB SHADOW E&M-EST. PATIENT-LVL III: CPT | Mod: PBBFAC,,, | Performed by: ORTHOPAEDIC SURGERY

## 2022-02-15 PROCEDURE — 1159F PR MEDICATION LIST DOCUMENTED IN MEDICAL RECORD: ICD-10-PCS | Mod: CPTII,S$GLB,, | Performed by: ORTHOPAEDIC SURGERY

## 2022-02-15 PROCEDURE — 99204 PR OFFICE/OUTPT VISIT, NEW, LEVL IV, 45-59 MIN: ICD-10-PCS | Mod: S$GLB,,, | Performed by: ORTHOPAEDIC SURGERY

## 2022-02-15 PROCEDURE — 99999 PR PBB SHADOW E&M-EST. PATIENT-LVL III: ICD-10-PCS | Mod: PBBFAC,,, | Performed by: ORTHOPAEDIC SURGERY

## 2022-02-15 PROCEDURE — 3008F PR BODY MASS INDEX (BMI) DOCUMENTED: ICD-10-PCS | Mod: CPTII,S$GLB,, | Performed by: ORTHOPAEDIC SURGERY

## 2022-02-15 PROCEDURE — 3008F BODY MASS INDEX DOCD: CPT | Mod: CPTII,S$GLB,, | Performed by: ORTHOPAEDIC SURGERY

## 2022-02-15 PROCEDURE — 73630 X-RAY EXAM OF FOOT: CPT | Mod: TC,RT

## 2022-02-15 PROCEDURE — 1160F RVW MEDS BY RX/DR IN RCRD: CPT | Mod: CPTII,S$GLB,, | Performed by: ORTHOPAEDIC SURGERY

## 2022-02-15 PROCEDURE — 1160F PR REVIEW ALL MEDS BY PRESCRIBER/CLIN PHARMACIST DOCUMENTED: ICD-10-PCS | Mod: CPTII,S$GLB,, | Performed by: ORTHOPAEDIC SURGERY

## 2022-02-15 PROCEDURE — 1159F MED LIST DOCD IN RCRD: CPT | Mod: CPTII,S$GLB,, | Performed by: ORTHOPAEDIC SURGERY

## 2022-02-15 PROCEDURE — 99204 OFFICE O/P NEW MOD 45 MIN: CPT | Mod: S$GLB,,, | Performed by: ORTHOPAEDIC SURGERY

## 2022-02-15 NOTE — PROGRESS NOTES
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 She states that the pain began 1 day after wearing flats with little support, and it has been waxing and waning in severity since then. Pain is located under the ball of her foot at first metatarsal and is worse in terminal stance phase. She states that it feels better in cushioned shoes and is worse with activity. She has been able to continue running, walking, working, but often has increased pain afterwards. She has stopped doing barre workouts because of the stress on her toes. Pain severity is about a 3/10 today after working overnight last night. There is no associated numbness and tingling. No history of injury or prior surgery. She cannot take NSAIDs because of recent gastric ulcer.       PAST MEDICAL/SURGICAL HISTORY:  Past Medical History:   Diagnosis Date    Asthma 2001    Exercise induced    Basal cell carcinoma     Migraine      Past Surgical History:   Procedure Laterality Date    ABDOMINAL SURGERY  9/3015    Umbilical, bilateral inguinal  hernia repair    HERNIA REPAIR      umbilical, inguinal hernia repair    HYSTEROSCOPY  11/2011    Hysteroscopy polypectomy    HYSTEROSCOPY W/ POLYPECTOMY      PELVIC LAPAROSCOPY  12/2016    IUD removal    REMOVAL OF INTRAUTERINE DEVICE (IUD)      laparoscopy for perforated IUD    SKIN BIOPSY         Current Medications:   Current Outpatient Medications:     fish oil-omega-3 fatty acids 300-1,000 mg capsule, Take by mouth once daily., Disp: , Rfl:     magnesium 250 mg Tab, Take by mouth once., Disp: , Rfl:     multivitamin (THERAGRAN) per tablet, Take 1 tablet by mouth once daily., Disp: , Rfl:     tranexamic acid (LYSTEDA) 650 mg tablet, Take 1,300 mg by mouth 3 (three) times daily.,  "Disp: , Rfl:     albuterol (PROAIR HFA) 90 mcg/actuation inhaler, Inhale 2 puffs into the lungs every 6 (six) hours as needed for Wheezing. Rescue, Disp: 18 g, Rfl: 1    baclofen (LIORESAL) 10 MG tablet, Take 0.5 tablets (5 mg total) by mouth 3 (three) times daily., Disp: 90 tablet, Rfl: 1    rizatriptan (MAXALT) 10 MG tablet, Take 1 tablet (10 mg total) by mouth as needed for Migraine (May repeat after 2 hours x 2, if needed.  Not to exceed 30mg/24 hours)., Disp: 27 tablet, Rfl: 3    Current Facility-Administered Medications:     albuterol inhaler 2 puff, 2 puff, Inhalation, Q20 Min PRN, Yasir Callahan MD    Social History:   Social History     Socioeconomic History    Marital status:    Tobacco Use    Smoking status: Never Smoker    Smokeless tobacco: Never Used   Substance and Sexual Activity    Alcohol use: Yes     Alcohol/week: 1.0 standard drink     Types: 1 Glasses of wine per week     Comment: few times a week    Drug use: Never    Sexual activity: Yes     Partners: Male     Birth control/protection: Partner-Vasectomy   Other Topics Concern    Are you pregnant or think you may be? No    Breast-feeding No       REVIEW OF SYSTEMS:  Constitution: Negative. Negative for chills, fever and night sweats.   Cardiovascular: Negative for chest pain and syncope.   Respiratory: Negative for cough and shortness of breath.   Gastrointestinal: See HPI. Negative for nausea/vomiting. Negative for abdominal pain.  Genitourinary: See HPI. Negative for discoloration or dysuria.  Skin: Negative for dry skin, itching and rash.   Hematologic/Lymphatic: Negative for bleeding problem. Does not bruise/bleed easily.   Musculoskeletal: Negative for falls and muscle weakness.   Neurological: See HPI. No seizures.   Endocrine: Negative for polydipsia, polyphagia and polyuria.   Allergic/Immunologic: Negative for hives and persistent infections.    PHYSICAL EXAMINATION:    Ht 5' 9" (1.753 m)   Wt 69.4 kg (153 lb)  "  BMI 22.59 kg/m²     General: The patient is a healthy 42 y.o. female in no apparent distress, the patient is orientatied to person, place and time.   Psych: Normal mood and affect  HEENT:  NCAT, sclera nonicteric  Lungs:  Respirations are equal and unlabored.  CV:  2+ bilateral upper and lower extremity pulses.  Skin:  Intact throughout.  Musculoskeletal: No pain with the range of motion of the bilateral hips. No pain with range of motion about the bilateral knees.      Right Foot and Ankle Exam    INSPECTION:      ALIGNMENT:  Gait:    Normal    Hindfoot  Normal    Scars:   None    Midfoot: Normal  Swelling:  None    Forefoot: Normal  Color:   Normal      Atrophy:  None    Collective Ankle-Hindfoot Alignment    Heel / Toe Walking: Pain w R toe walking  Good -plantigrade (PG), well aligned                TENDERNESS:  LATERAL:    ANTERIOR:  Sinus tarsi:  None  Anteromedial joint line:  none  Syndesmosis:  none  Anterolateral joint line:   none  ATFL:   none  Talonavicular:    none   CFL:   none  Anterior tibialis:   none  Anterolateral gutter: none  Extensor tendons:   none  Fibula:   none  Peroneal tendons: none  POSTERIOR:  Peroneal tubercle.  None  Medial/lateral achilles:   none       Medial/lateral achilles insertion: none  MEDIAL:      Deltoid:  none  CALCANEUS:  Malleolus:  none  Retrocalcaneal:   none  PTT:   none  Medial achilles:   none  Navicular:  none  Lateral achilles:   none       Calcaneal tuberosity:   none  FOOT:    Calcaneal cuboid  none MT / MT heads:  Moderate tenderness R 1st MT head laterally  Navicular   none  Medial cord origin PF:  none  Cuneiforms:   none  Web space:   none  Lisfranc    none  Tarsal tunnel:   none  Base of the fifth metatarsal  none Tinels sign   neg        RANGE OF MOTION:  RIGHT/ LEFT   STRENGTH: (affected)  Ankle DF/PF:  15/45  15/45    Anterior tibialis: 5/5     Eversion/Inversion: 15/25 15/25  Posterior tibialis: 5/5   Midfoot  ABD/ADD: 10/10 10/10  Gastroc-soleus: 5/5   First MTP DF/PF: 60/25 60/25  Peroneals:  5/5         EHL:   5/5   (* = pain)     FHL:   5/5         (* = pain)      SPECIAL TESTS:   ANKLE INSTABILITY: (*pain)    Anterior drawer:   Normal      (C-W contralateral side)     Inversion:   30°     Eversion  10°            Collective Instability: (Ant-post and varus-valgus)     Stable         NEUROLOGIC TESTING:  All dermatomes foot, ankle and leg have normal sensation light touch  Ankle Reflexes 2+, symmetric       VASCULAR:  2+ pulses PT/DT with brisk capillary refill toes.          IMAGING:     Radiographs of the right foot were ordered and personally reviewed with the patient today.  No evidence of fracture/dislocation of 1st metatarsal. 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 Xrays show a small avulsion at the proximal pole of lateral sesamoid, consistent with sesamoiditis. Favor sesamoiditis with avulsion within the flexor digitorum brevis tendon over true sesamoid fracture. Diagnosis and management was discussed in detail with patient. As this is likely an overuse injury with inflammation, recommend period of offloading the weight on the MTP joint. Pt was given a fracture boot, which she will wear for the next month to encourage heel weight bearing. Cannot take NSAIDs. Ice and elevate when able. Pt will follow up in 1 month to evaluate symptoms at that point. If patient is still symptomatic, we discussed MRI at that time. Discussed possible sesamoidectomy if things do not improve, but results are unpredictable. This is hopefully something that will resolve with conservative management, although this can be difficult to treat.      1. Sesamoiditis of  right foot     2. Metatarsalgia, right foot     3. Right foot pain  X-Ray Foot Complete Right       I have personally taken the history and examined this patient and agree with the residents note as stated above.  I suspect she has lateral sesamoiditis that will hopefully resolve with a period of nonweightbearing on the ball of her right foot.  If her symptoms are not improved at follow-up I will consider obtaining an MRI scan to confirm a diagnosis of sesamoiditis.

## 2022-02-22 ENCOUNTER — PATIENT MESSAGE (OUTPATIENT)
Dept: ORTHOPEDICS | Facility: CLINIC | Age: 42
End: 2022-02-22
Payer: COMMERCIAL

## 2022-02-24 ENCOUNTER — CLINICAL SUPPORT (OUTPATIENT)
Dept: REHABILITATION | Facility: HOSPITAL | Age: 42
End: 2022-02-24
Attending: PSYCHIATRY & NEUROLOGY
Payer: COMMERCIAL

## 2022-02-24 DIAGNOSIS — R29.898 DECREASED ROM OF NECK: Primary | ICD-10-CM

## 2022-02-24 PROCEDURE — 97140 MANUAL THERAPY 1/> REGIONS: CPT | Mod: PO

## 2022-02-24 NOTE — PROGRESS NOTES
"  [facility]   Physical Therapy Treatment Note     Name: Wendy Munoz  Clinic Number: [id number]    Therapy Diagnosis:   Encounter Diagnosis   Name Primary?    Decreased ROM of neck Yes     Physician: Jose Melendez MD    Visit Date: 2/24/2022    Physician Orders: PT Eval and Treat   Medical Diagnosis from Referral:   G43.719 (ICD-10-CM) - Chronic migraine without aura, intractable, without status migrainosus   M79.18 (ICD-10-CM) - Cervical myofascial pain syndrome         Evaluation Date: 11/29/2021  Authorization Period Expiration: 6/01/2022  Plan of Care Expiration: 3/31/2022  Progress Note Due: 2/05/2022  Visit # / Visits authorized: 5 / 20 (new auth)  FOTO: tbd/ tbd        Precautions: Standard      Time In: 301 pm  Time Out: 344 pm  Total Billable Time:  43 minutes MT 3      SUBJECTIVE     Pt reports: that she had a migraine for most days without much relief overall. Does feel like therapy is helping overall just no change with frequency    She was compliant with home exercise program.  Response to previous treatment: felt fine    Functional change: ongoing     Pain: 4/10 pre and 2/10 post   Location: R>L neck      OBJECTIVE     Objective Measures updated at progress report unless specified.       Treatment     Wendy received the treatments listed below:      therapeutic exercises to develop strength, endurance, ROM, flexibility and posture for 0 minutes including:    B shoulder no money, YTB: 2x10  Serratus wall slides, YTB: 2x8  IR/ER with YTB: 2x12  Prone chin tuck with cervical lift: 2x10, 3"  Supine Y's with YTB: 3x10  Supine chin tucks x 20 3"  Foam roll series: 10x10"each  Side lying open books x 10, 5" hold  Shoulder extension GTB 2 x 10   Shoulder rows GTB 2 x 10       manual therapy techniques: Soft tissue Mobilization, Joint Mobilizations, and Dry Needling were applied for 43 minutes, including:    -TDN without ES to right C3-C6 multifidus/rotatore muscles " "with patient positioned prone using 0.30x40 Seirin J Type Needle. After third needle was inserted, patient reporting increased perspiration and pain. Asked to remove needles. Needles were removed. Followed up with STM and MFR using cupping.   -Prone Grade IV UPA right C3-C4 mobs: 3x30"  -MFR to right pectoralis major and minor and levator scapulae  From most tender to touch to least: levator, masseter, temporalis, frontalis, occipitalis, suboccipital recti, obliquus, SCM, long head triceps, pronator teres, anterior scalenes all R>L except SCM  Cervical traction  Cervical mobs grade III into rotation C2-4 and sideglide R to L with traction  Pronator teres manual STM/MFR and PA and AP radial ulnar mobs grade III into supination.      Patient Education and Home Exercises     Home Exercises Provided and Patient Education Provided     Education:  -HEP  -theracane and self release to levator scapulae, upper trapezius, and suboccipital muscles     Written Home Exercises Provided: Patient instructed to cont prior HEP. Exercises were reviewed and Wendy was able to demonstrate them prior to the end of the session.  Wendy demonstrated good  understanding of the education provided. See EMR under Patient Instructions for exercises provided during therapy sessions    ASSESSMENT        Wendy with marked muscle guarding with biggest triggers masseter, temporalis, suboccipitals levator. Pt had entrapment of median nerve at interosseous space at proximal radial ulnar space and complicated by pronator teres and immediate but short lived relief with manual. Pt with multiple trigger points and limits with good relief after session. Pt report carryover to next therapist.        Wendy is progressing well towards her goals.   Pt prognosis is Good.     Pt will continue to benefit from skilled outpatient physical therapy to address the deficits listed in the problem list box on initial evaluation, provide pt/family education and to " maximize pt's level of independence in the home and community environment.     Pt's spiritual, cultural and educational needs considered and pt agreeable to plan of care and goals.     Anticipated barriers to physical therapy: none    Goals:   Short Term Goals: 6 weeks  1.Report decreased neck/head pain  <   / =  4  /10  to increase tolerance for ADL's, work activities, and self care. Progressing, 02/24/2022  2. Increase cervical ROM by 5-10 degrees in order to perform ADLs with decreased difficulty. Progressing, 02/24/2022  3. Increase strength in B shoulders/scapular stabilizers by 1/3 MMT grade to increase tolerance for ADL and work activities. Not assessed   4. Pt to tolerate HEP to improve ROM and independence with ADL's. Met, 02/24/2022     Long Term Goals: 10 weeks  1.Report decreased neck/head pain  <   / =  0-1 /10  to increase tolerance for ADL's, work activities, and self care  2. Increase UE/neck flexibility in order to improve posture.    3.Increased strength in B shoulders/scapular stabilizers to >/= 4/5 MMT grade to increase tolerance for ADL and work activities.       PLAN     Continue scapular and cervical strengthening , update HEP, thoracic mobility     Idalia Olvera, PT

## 2022-03-10 ENCOUNTER — TELEPHONE (OUTPATIENT)
Dept: OBSTETRICS AND GYNECOLOGY | Facility: CLINIC | Age: 42
End: 2022-03-10
Payer: COMMERCIAL

## 2022-03-10 ENCOUNTER — PATIENT MESSAGE (OUTPATIENT)
Dept: OBSTETRICS AND GYNECOLOGY | Facility: CLINIC | Age: 42
End: 2022-03-10
Payer: COMMERCIAL

## 2022-03-10 DIAGNOSIS — R10.2 PELVIC PAIN: Primary | ICD-10-CM

## 2022-03-11 ENCOUNTER — PATIENT MESSAGE (OUTPATIENT)
Dept: OBSTETRICS AND GYNECOLOGY | Facility: CLINIC | Age: 42
End: 2022-03-11
Payer: COMMERCIAL

## 2022-03-14 ENCOUNTER — OFFICE VISIT (OUTPATIENT)
Dept: ORTHOPEDICS | Facility: CLINIC | Age: 42
End: 2022-03-14
Payer: COMMERCIAL

## 2022-03-14 VITALS — WEIGHT: 153 LBS | BODY MASS INDEX: 22.66 KG/M2 | HEIGHT: 69 IN

## 2022-03-14 DIAGNOSIS — M77.41 METATARSALGIA, RIGHT FOOT: ICD-10-CM

## 2022-03-14 DIAGNOSIS — M25.871 SESAMOIDITIS OF RIGHT FOOT: Primary | ICD-10-CM

## 2022-03-14 PROCEDURE — 99999 PR PBB SHADOW E&M-EST. PATIENT-LVL III: CPT | Mod: PBBFAC,,, | Performed by: ORTHOPAEDIC SURGERY

## 2022-03-14 PROCEDURE — 1159F MED LIST DOCD IN RCRD: CPT | Mod: CPTII,S$GLB,, | Performed by: ORTHOPAEDIC SURGERY

## 2022-03-14 PROCEDURE — 3008F BODY MASS INDEX DOCD: CPT | Mod: CPTII,S$GLB,, | Performed by: ORTHOPAEDIC SURGERY

## 2022-03-14 PROCEDURE — 1160F PR REVIEW ALL MEDS BY PRESCRIBER/CLIN PHARMACIST DOCUMENTED: ICD-10-PCS | Mod: CPTII,S$GLB,, | Performed by: ORTHOPAEDIC SURGERY

## 2022-03-14 PROCEDURE — 99999 PR PBB SHADOW E&M-EST. PATIENT-LVL III: ICD-10-PCS | Mod: PBBFAC,,, | Performed by: ORTHOPAEDIC SURGERY

## 2022-03-14 PROCEDURE — 1160F RVW MEDS BY RX/DR IN RCRD: CPT | Mod: CPTII,S$GLB,, | Performed by: ORTHOPAEDIC SURGERY

## 2022-03-14 PROCEDURE — 3008F PR BODY MASS INDEX (BMI) DOCUMENTED: ICD-10-PCS | Mod: CPTII,S$GLB,, | Performed by: ORTHOPAEDIC SURGERY

## 2022-03-14 PROCEDURE — 99212 OFFICE O/P EST SF 10 MIN: CPT | Mod: S$GLB,,, | Performed by: ORTHOPAEDIC SURGERY

## 2022-03-14 PROCEDURE — 99212 PR OFFICE/OUTPT VISIT, EST, LEVL II, 10-19 MIN: ICD-10-PCS | Mod: S$GLB,,, | Performed by: ORTHOPAEDIC SURGERY

## 2022-03-14 PROCEDURE — 1159F PR MEDICATION LIST DOCUMENTED IN MEDICAL RECORD: ICD-10-PCS | Mod: CPTII,S$GLB,, | Performed by: ORTHOPAEDIC SURGERY

## 2022-03-14 NOTE — PROGRESS NOTES
Wendy Munoz  Returns today for follow-up.  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  Clinical exam and plain x-ray suggested sesamoiditis of the lateral sesamoid under the right 1st metatarsal head.  I recommended of course of boot immobilization and activity modification.  She returns today and reports that her symptoms are improved over the last 4 weeks and as well as with limited activity.  She states that she would like to progress low-impact activities such as walking and walking her dog.  She is okay to refrain from running at this time.    Examination:  She reports less tenderness with palpation of the lateral sesamoid on her right 1st metatarsal head.  She has full functional motion of her right big toe MTP joint with no discomfort.    Impression:  1. Sesamoiditis of right foot     2. Metatarsalgia, right foot         Recommendation:  It is encouraging that her symptoms have improved over the last 4 weeks with limited activity.  At this point I would like her to start progressing her low-impact activity.  Hopefully the symptoms will continue to resolve, but if she has recurrence of symptoms then I would proceed with obtaining an MRI scan to evaluate the sesamoid complex.    Follow-up in 8 weeks if necessary

## 2022-03-21 ENCOUNTER — CLINICAL SUPPORT (OUTPATIENT)
Dept: REHABILITATION | Facility: HOSPITAL | Age: 42
End: 2022-03-21
Attending: PSYCHIATRY & NEUROLOGY
Payer: COMMERCIAL

## 2022-03-21 DIAGNOSIS — R29.898 DECREASED ROM OF NECK: Primary | ICD-10-CM

## 2022-03-21 PROCEDURE — 97140 MANUAL THERAPY 1/> REGIONS: CPT | Mod: PO

## 2022-03-21 NOTE — PROGRESS NOTES
"  [facility]   Physical Therapy Treatment Note     Name: Wendy Munoz  Clinic Number: [id number]    Therapy Diagnosis:   Encounter Diagnosis   Name Primary?    Decreased ROM of neck Yes     Physician: Jose Melendez MD    Visit Date: 3/21/2022    Physician Orders: PT Eval and Treat   Medical Diagnosis from Referral:   G43.719 (ICD-10-CM) - Chronic migraine without aura, intractable, without status migrainosus   M79.18 (ICD-10-CM) - Cervical myofascial pain syndrome         Evaluation Date: 11/29/2021  Authorization Period Expiration: 6/01/2022  Plan of Care Expiration: 3/31/2022  Progress Note Due: 2/05/2022  Visit # / Visits authorized: 5 / 20 (new auth)  FOTO: tbd/ tbd        Precautions: Standard      Time In: 245 pm  Time Out: 330 pm  Total Billable Time:  45 minutes MT 3      SUBJECTIVE     Pt reports: I had 4 days of relief after seeing you and the thumb sensation has not been a problem since you did the forearm release.  Currently have a migraine since this morning    She was compliant with home exercise program.  Response to previous treatment: good temporary relief for days.  Functional change: ongoing     Pain: 5/10 pre and 0/10 post no headaches just mild soreness where you worked on me  Location: R>L neck      OBJECTIVE     Objective Measures updated at progress report unless specified.       Treatment     Wendy received the treatments listed below:      therapeutic exercises to develop strength, endurance, ROM, flexibility and posture for 0 minutes including:    B shoulder no money, YTB: 2x10  Serratus wall slides, YTB: 2x8  IR/ER with YTB: 2x12  Prone chin tuck with cervical lift: 2x10, 3"  Supine Y's with YTB: 3x10  Supine chin tucks x 20 3"  Foam roll series: 10x10"each  Side lying open books x 10, 5" hold  Shoulder extension GTB 2 x 10   Shoulder rows GTB 2 x 10       manual therapy techniques: Soft tissue Mobilization, Joint Mobilizations, and Dry Needling " "were applied for 45 minutes, including:    -TDN without ES to right C3-C6 multifidus/rotatore muscles with patient positioned prone using 0.30x40 Seirin J Type Needle. After third needle was inserted, patient reporting increased perspiration and pain. Asked to remove needles. Needles were removed. Followed up with STM and MFR using cupping.   -Prone Grade IV UPA right C3-C4 mobs: 3x30"  -MFR to right pectoralis major and minor and levator scapulae  From most tender to touch to least: levator, masseter, temporalis, frontalis, occipitalis, suboccipital recti, obliquus, SCM, long head triceps, pronator teres, anterior scalenes all R>L except SCM  Cervical traction  Cervical mobs grade III into rotation C2-4 and sideglide R to L with traction  Pronator teres manual STM/MFR and PA and AP radial ulnar mobs grade III into supination.    STM pectoralis minor, subclavius and teres minor with GH distraction and traction      Patient Education and Home Exercises     Home Exercises Provided and Patient Education Provided     Education:  -HEP  -theracane and self release to levator scapulae, upper trapezius, and suboccipital muscles     Written Home Exercises Provided: Patient instructed to cont prior HEP. Exercises were reviewed and Wendy was able to demonstrate them prior to the end of the session.  Wendy demonstrated good  understanding of the education provided. See EMR under Patient Instructions for exercises provided during therapy sessions    ASSESSMENT        Wendy with good relief response with session today and quick resolution on R suboccpitals, SCM and teres minor and scalenes. Pt education with need for consistency in care for improved carryover. Pt with good insight but limits due to work schedule and scheduling for therapy.       Wendy is progressing well towards her goals.   Pt prognosis is Good.     Pt will continue to benefit from skilled outpatient physical therapy to address the deficits listed in the " problem list box on initial evaluation, provide pt/family education and to maximize pt's level of independence in the home and community environment.     Pt's spiritual, cultural and educational needs considered and pt agreeable to plan of care and goals.     Anticipated barriers to physical therapy: none    Goals:   Short Term Goals: 6 weeks  1.Report decreased neck/head pain  <   / =  4  /10  to increase tolerance for ADL's, work activities, and self care. Progressing, 03/21/2022  2. Increase cervical ROM by 5-10 degrees in order to perform ADLs with decreased difficulty. Progressing, 03/21/2022  3. Increase strength in B shoulders/scapular stabilizers by 1/3 MMT grade to increase tolerance for ADL and work activities. Not assessed   4. Pt to tolerate HEP to improve ROM and independence with ADL's. Met, 03/21/2022     Long Term Goals: 10 weeks  1.Report decreased neck/head pain  <   / =  0-1 /10  to increase tolerance for ADL's, work activities, and self care  2. Increase UE/neck flexibility in order to improve posture.    3.Increased strength in B shoulders/scapular stabilizers to >/= 4/5 MMT grade to increase tolerance for ADL and work activities.       PLAN     Continue scapular and cervical strengthening , update HEP, thoracic mobility     Idalia Olvera, PT

## 2022-03-29 ENCOUNTER — CLINICAL SUPPORT (OUTPATIENT)
Dept: REHABILITATION | Facility: HOSPITAL | Age: 42
End: 2022-03-29
Attending: PSYCHIATRY & NEUROLOGY
Payer: COMMERCIAL

## 2022-03-29 DIAGNOSIS — R29.898 DECREASED ROM OF NECK: Primary | ICD-10-CM

## 2022-03-29 PROCEDURE — 97140 MANUAL THERAPY 1/> REGIONS: CPT | Mod: PO

## 2022-03-29 NOTE — PROGRESS NOTES
"  [facility]   Physical Therapy Treatment Note     Name: Wendy Munoz  Clinic Number: [id number]    Therapy Diagnosis:   Encounter Diagnosis   Name Primary?    Decreased ROM of neck Yes     Physician: Jose Melendez MD    Visit Date: 3/29/2022    Physician Orders: PT Eval and Treat   Medical Diagnosis from Referral:   G43.719 (ICD-10-CM) - Chronic migraine without aura, intractable, without status migrainosus   M79.18 (ICD-10-CM) - Cervical myofascial pain syndrome         Evaluation Date: 11/29/2021  Authorization Period Expiration: 6/01/2022  Plan of Care Expiration: 3/31/2022  Progress Note Due: 2/05/2022  Visit # / Visits authorized: 6 / 20 (new auth)  FOTO: tbd/ tbd        Precautions: Standard      Time In: 105  Time Out: 151  Total Billable Time:  46 minutes       SUBJECTIVE     Pt reports: her neck has been feeling pretty good this week.  She has not had to take migraine medication in ~1 week.       She was compliant with home exercise program.  Response to previous treatment: good temporary relief for days.  Functional change: ongoing     Pain: 2/10   Location: R>L neck      OBJECTIVE     Objective Measures updated at progress report unless specified.       Treatment     Wendy received the treatments listed below:      therapeutic exercises to develop strength, endurance, ROM, flexibility and posture for 00 minutes including:    B shoulder no money, YTB: 2x10  Serratus wall slides, YTB: 2x8  IR/ER with YTB: 2x12  Prone chin tuck with cervical lift: 2x10, 3"  Supine Y's with YTB: 3x10  Supine chin tucks x 20 3"  Foam roll series: 10x10"each  Side lying open books x 10, 5" hold  Shoulder extension GTB 2 x 10   Shoulder rows GTB 2 x 10       manual therapy techniques: Soft tissue Mobilization, Joint Mobilizations, and Dry Needling were applied for 46 minutes, including:    -Prone Grade III Left UPA right T3-T4 mobs: 3x30"  -Pin and stretch to left levator " scapulae  -Cervical traction: 5 minutes  -Pronator teres manual STM/MFR and PA and AP radial ulnar mobs grade III into supination  -mobilization to interosseus membrane       Patient Education and Home Exercises     Home Exercises Provided and Patient Education Provided     Education:  -HEP  -theracane and self release to levator scapulae, upper trapezius, and suboccipital muscles     Written Home Exercises Provided: Patient instructed to cont prior HEP. Exercises were reviewed and Wendy was able to demonstrate them prior to the end of the session.  Wendy demonstrated good  understanding of the education provided. See EMR under Patient Instructions for exercises provided during therapy sessions    ASSESSMENT      Wendy presented to treatment session with no migraine or neck pain in ~1 week. She is restricted into left thoracic rotation which was addressed through manual interventions. Discussed sleeping positions with using proper pillow to facilitate proper spinal alignment. Recommend ergonomic assessment and transitioning to wellness program for maintenance.         Wendy is progressing well towards her goals.   Pt prognosis is Good.     Pt will continue to benefit from skilled outpatient physical therapy to address the deficits listed in the problem list box on initial evaluation, provide pt/family education and to maximize pt's level of independence in the home and community environment.     Pt's spiritual, cultural and educational needs considered and pt agreeable to plan of care and goals.     Anticipated barriers to physical therapy: none    Goals:   Short Term Goals: 6 weeks  1.Report decreased neck/head pain  <   / =  4  /10  to increase tolerance for ADL's, work activities, and self care. Progressing, 03/29/2022  2. Increase cervical ROM by 5-10 degrees in order to perform ADLs with decreased difficulty. Progressing, 03/29/2022  3. Increase strength in B shoulders/scapular stabilizers by 1/3 MMT grade  to increase tolerance for ADL and work activities. Not assessed   4. Pt to tolerate HEP to improve ROM and independence with ADL's. Met, 03/29/2022     Long Term Goals: 10 weeks  1.Report decreased neck/head pain  <   / =  0-1 /10  to increase tolerance for ADL's, work activities, and self care  2. Increase UE/neck flexibility in order to improve posture.    3.Increased strength in B shoulders/scapular stabilizers to >/= 4/5 MMT grade to increase tolerance for ADL and work activities.       PLAN     Continue scapular and cervical strengthening , update HEP, thoracic mobility     Awa Langston, PT

## 2022-04-07 ENCOUNTER — CLINICAL SUPPORT (OUTPATIENT)
Dept: REHABILITATION | Facility: HOSPITAL | Age: 42
End: 2022-04-07
Payer: COMMERCIAL

## 2022-04-07 DIAGNOSIS — R29.898 DECREASED ROM OF NECK: Primary | ICD-10-CM

## 2022-04-07 PROCEDURE — 97140 MANUAL THERAPY 1/> REGIONS: CPT | Mod: PO

## 2022-04-07 NOTE — PROGRESS NOTES
"  [facility]   Physical Therapy Treatment Note     Name: Wendy Munoz  Clinic Number: [id number]    Therapy Diagnosis:   Encounter Diagnosis   Name Primary?    Decreased ROM of neck Yes     Physician: Jose Melendez MD    Visit Date: 4/7/2022    Physician Orders: PT Eval and Treat   Medical Diagnosis from Referral:   G43.719 (ICD-10-CM) - Chronic migraine without aura, intractable, without status migrainosus   M79.18 (ICD-10-CM) - Cervical myofascial pain syndrome         Evaluation Date: 11/29/2021  Authorization Period Expiration: 6/01/2022  Plan of Care Expiration: 3/31/2022  Progress Note Due: 2/05/2022  Visit # / Visits authorized: 6 / 20 (new auth)  FOTO: tbd/ tbd        Precautions: Standard      Time In: 1150 am  Time Out: 1235 pm  Total Billable Time:  45 minutes       SUBJECTIVE     Pt reports: her neck has been feeling pretty good this week had migraine due to very stress day and needed medication so 2x this week       She was compliant with home exercise program.  Response to previous treatment: good temporary relief for days.  Functional change: ongoing     Pain: 2/10 pre and 0/10 post session.   Location: R>L neck      OBJECTIVE     Objective Measures updated at progress report unless specified.       Treatment     Wendy received the treatments listed below:      therapeutic exercises to develop strength, endurance, ROM, flexibility and posture for 3 minutes including:    B shoulder no money, YTB: 2x10  Serratus wall slides, YTB: 2x8  IR/ER with YTB: 2x12  Prone chin tuck with cervical lift: 2x10, 3"  Supine Y's with YTB: 3x10  Supine chin tucks x 20 3"  Foam roll series: 10x10"each  Side lying open books x 10, 5" hold  Shoulder extension GTB 2 x 10   Shoulder rows GTB 2 x 10    Levator stretch with strap 3 x 30 secs  Upper trap stretch with strap 3 x 30 secs       manual therapy techniques: Soft tissue Mobilization, Joint Mobilizations, and Dry Needling were " "applied for 42 minutes, including:    -Prone Grade III Left UPA right T3-T4 mobs: 3x30"  -Pin and stretch to left levator scapulae  -Cervical traction: 5 minutes  -Pronator teres manual STM/MFR and PA and AP radial ulnar mobs grade III into supination  -mobilization to interosseus membrane   STM/MFR SCM, teres minor, levator, anterior scalene, subclavius, pectoralis minor, suboccipitals, rhomboids, paraspinals.   Grade II/III mobs C2-T7 into rotation      Patient Education and Home Exercises     Home Exercises Provided and Patient Education Provided     Education:  -HEP  -theracane and self release to levator scapulae, upper trapezius, and suboccipital muscles     Written Home Exercises Provided: Patient instructed to cont prior HEP. Exercises were reviewed and Wnedy was able to demonstrate them prior to the end of the session.  Wendy demonstrated good  understanding of the education provided. See EMR under Patient Instructions for exercises provided during therapy sessions    ASSESSMENT      Wendy with good relief and self management this week with stressful situation and with use of a new triptan for migraine. Pt is overall very optimistic with outcome and future progress with issues steadily resolving and overall reduction in frequency and intensity of issues with receptiveness with POC beyond therapy services.    Wendy is progressing well towards her goals.   Pt prognosis is Good.     Pt will continue to benefit from skilled outpatient physical therapy to address the deficits listed in the problem list box on initial evaluation, provide pt/family education and to maximize pt's level of independence in the home and community environment.     Pt's spiritual, cultural and educational needs considered and pt agreeable to plan of care and goals.     Anticipated barriers to physical therapy: none    Goals:   Short Term Goals: 6 weeks  1.Report decreased neck/head pain  <   / =  4  /10  to increase tolerance for " ADL's, work activities, and self care. MET, 04/07/2022  2. Increase cervical ROM by 5-10 degrees in order to perform ADLs with decreased difficulty. Progressing, 04/07/2022  3. Increase strength in B shoulders/scapular stabilizers by 1/3 MMT grade to increase tolerance for ADL and work activities. Not assessed   4. Pt to tolerate HEP to improve ROM and independence with ADL's. Met, 04/07/2022     Long Term Goals: 10 weeks  1.Report decreased neck/head pain  <   / =  0-1 /10  to increase tolerance for ADL's, work activities, and self care. MET 4/7/22  2. Increase UE/neck flexibility in order to improve posture.    3.Increased strength in B shoulders/scapular stabilizers to >/= 4/5 MMT grade to increase tolerance for ADL and work activities.       PLAN     Continue scapular and cervical strengthening , update HEP, thoracic mobility     Idalia Olvera, PT

## 2022-04-19 ENCOUNTER — CLINICAL SUPPORT (OUTPATIENT)
Dept: REHABILITATION | Facility: HOSPITAL | Age: 42
End: 2022-04-19
Payer: COMMERCIAL

## 2022-04-19 DIAGNOSIS — R29.898 DECREASED ROM OF NECK: Primary | ICD-10-CM

## 2022-04-19 PROCEDURE — 97140 MANUAL THERAPY 1/> REGIONS: CPT | Mod: PO

## 2022-04-19 NOTE — PROGRESS NOTES
"  [facility]   Physical Therapy Treatment Note     Name: Wendy Hawkins Munoz  Clinic Number: [id number]    Therapy Diagnosis:   Encounter Diagnosis   Name Primary?    Decreased ROM of neck Yes     Physician: Jose Melendez MD    Visit Date: 4/19/2022    Physician Orders: PT Eval and Treat   Medical Diagnosis from Referral:   G43.719 (ICD-10-CM) - Chronic migraine without aura, intractable, without status migrainosus   M79.18 (ICD-10-CM) - Cervical myofascial pain syndrome         Evaluation Date: 11/29/2021  Authorization Period Expiration: 6/01/2022  Plan of Care Expiration: 3/31/2022  Progress Note Due: 2/05/2022  Visit # / Visits authorized: 6 / 20 (new auth)  FOTO: tbd/ tbd        Precautions: Standard      Time In: 935 am ( late arrival and check in)  Time Out: 1015 am  Total Billable Time:  40 minutes MT 3      SUBJECTIVE     Pt reports: with increased pain due to stressful work and then complicated by the easter weekend with preparation and then with children were sick.      She was compliant with home exercise program.  Response to previous treatment: good temporary relief for days.  Functional change: ongoing     Pain: 3-4/10 pre and 0/10 post session.   Location: R<L neck      OBJECTIVE     Objective Measures updated at progress report unless specified.       Treatment     Wendy received the treatments listed below:      therapeutic exercises to develop strength, endurance, ROM, flexibility and posture for 0 minutes including:    B shoulder no money, YTB: 2x10  Serratus wall slides, YTB: 2x8  IR/ER with YTB: 2x12  Prone chin tuck with cervical lift: 2x10, 3"  Supine Y's with YTB: 3x10  Supine chin tucks x 20 3"  Foam roll series: 10x10"each  Side lying open books x 10, 5" hold  Shoulder extension GTB 2 x 10   Shoulder rows GTB 2 x 10    Levator stretch with strap 3 x 30 secs  Upper trap stretch with strap 3 x 30 secs       manual therapy techniques: Soft tissue " "Mobilization, Joint Mobilizations, and Dry Needling were applied for 40 minutes, including:    -Prone Grade III Left UPA right T3-T4 mobs: 3x30"  -Pin and stretch to left levator scapulae  -Cervical traction: 5 minutes  -Pronator teres manual STM/MFR and PA and AP radial ulnar mobs grade III into supination  -mobilization to interosseus membrane   STM/MFR SCM, teres minor, levator, anterior scalene, subclavius, pectoralis minor, suboccipitals, rhomboids, paraspinals.   Grade II/III mobs C2-T7 into rotation      Patient Education and Home Exercises     Home Exercises Provided and Patient Education Provided     Education:  -HEP  -theracane and self release to levator scapulae, upper trapezius, and suboccipital muscles     Written Home Exercises Provided: Patient instructed to cont prior HEP. Exercises were reviewed and Wendy was able to demonstrate them prior to the end of the session.  Wendy demonstrated good  understanding of the education provided. See EMR under Patient Instructions for exercises provided during therapy sessions    ASSESSMENT      Wendy with good relief with session but limited with workload and busy weekend with easter celebration and children being sick leading to holding child on L and leading to levator tightness SCM and suboccipital on L with counter-activation on R. Pt had 3 of 4 days of migraines and needed use of prescription meds for migraines to address.     Wendy is progressing well towards her goals.   Pt prognosis is Good.     Pt will continue to benefit from skilled outpatient physical therapy to address the deficits listed in the problem list box on initial evaluation, provide pt/family education and to maximize pt's level of independence in the home and community environment.     Pt's spiritual, cultural and educational needs considered and pt agreeable to plan of care and goals.     Anticipated barriers to physical therapy: none    Goals:   Short Term Goals: 6 weeks  1.Report " decreased neck/head pain  <   / =  4  /10  to increase tolerance for ADL's, work activities, and self care. MET, 04/19/2022  2. Increase cervical ROM by 5-10 degrees in order to perform ADLs with decreased difficulty. Progressing, 04/19/2022  3. Increase strength in B shoulders/scapular stabilizers by 1/3 MMT grade to increase tolerance for ADL and work activities. Not assessed   4. Pt to tolerate HEP to improve ROM and independence with ADL's. Met, 04/19/2022     Long Term Goals: 10 weeks  1.Report decreased neck/head pain  <   / =  0-1 /10  to increase tolerance for ADL's, work activities, and self care. MET 4/7/22  2. Increase UE/neck flexibility in order to improve posture.    3.Increased strength in B shoulders/scapular stabilizers to >/= 4/5 MMT grade to increase tolerance for ADL and work activities.       PLAN     Continue scapular and cervical strengthening , update HEP, thoracic mobility     Idalia Olvera, PT

## 2022-04-21 ENCOUNTER — CLINICAL SUPPORT (OUTPATIENT)
Dept: REHABILITATION | Facility: HOSPITAL | Age: 42
End: 2022-04-21
Payer: COMMERCIAL

## 2022-04-21 DIAGNOSIS — R29.898 DECREASED ROM OF NECK: Primary | ICD-10-CM

## 2022-04-21 PROCEDURE — 97140 MANUAL THERAPY 1/> REGIONS: CPT | Mod: PO

## 2022-04-21 NOTE — PROGRESS NOTES
"  [facility]   Physical Therapy Treatment Note     Name: Wendy Hawkins Munoz  Clinic Number: [id number]    Therapy Diagnosis:   Encounter Diagnosis   Name Primary?    Decreased ROM of neck Yes     Physician: Jose Melendez MD    Visit Date: 4/21/2022    Physician Orders: PT Eval and Treat   Medical Diagnosis from Referral:   G43.719 (ICD-10-CM) - Chronic migraine without aura, intractable, without status migrainosus   M79.18 (ICD-10-CM) - Cervical myofascial pain syndrome         Evaluation Date: 11/29/2021  Authorization Period Expiration: 6/01/2022  Plan of Care Expiration: 3/31/2022  Progress Note Due: 2/05/2022  Visit # / Visits authorized: 6 / 20 (new auth)  FOTO: tbd/ tbd        Precautions: Standard      Time In: 300 pm   Time Out: 345 pm  Total Billable Time:  40 minutes MT 3      SUBJECTIVE     Pt reports: with increased pain due today after sleeping on the shift last night in bad bed but had good day after session.       She was compliant with home exercise program.  Response to previous treatment: good temporary relief for days.  Functional change: ongoing     Pain: 5/10 pre and 3/10 post session.   Location: R<L neck      OBJECTIVE     Objective Measures updated at progress report unless specified.       Treatment     Wendy received the treatments listed below:      therapeutic exercises to develop strength, endurance, ROM, flexibility and posture for 0 minutes including:    B shoulder no money, YTB: 2x10  Serratus wall slides, YTB: 2x8  IR/ER with YTB: 2x12  Prone chin tuck with cervical lift: 2x10, 3"  Supine Y's with YTB: 3x10  Supine chin tucks x 20 3"  Foam roll series: 10x10"each  Side lying open books x 10, 5" hold  Shoulder extension GTB 2 x 10   Shoulder rows GTB 2 x 10    Levator stretch with strap 3 x 30 secs  Upper trap stretch with strap 3 x 30 secs       manual therapy techniques: Soft tissue Mobilization, Joint Mobilizations, and Dry Needling were applied " "for 45 minutes, including:    -Prone Grade III Left UPA right T1-T5 mobs: 3x30"  -supine side glide L to R on C 2-C5 grade III  -Pin and stretch to left levator scapulae  -Cervical traction: 5 minutes  -Pronator teres manual STM/MFR and PA and AP radial ulnar mobs grade III into supination  -mobilization to interosseus membrane   STM/MFR SCM, teres minor, levator, anterior scalene, subclavius, pectoralis minor, suboccipitals, rhomboids, paraspinals.   Grade II/III mobs C2-T7 into rotation      Patient Education and Home Exercises     Home Exercises Provided and Patient Education Provided     Education:  -HEP  -theracane and self release to levator scapulae, upper trapezius, and suboccipital muscles     Written Home Exercises Provided: Patient instructed to cont prior HEP. Exercises were reviewed and Wendy was able to demonstrate them prior to the end of the session.  Wendy demonstrated good  understanding of the education provided. See EMR under Patient Instructions for exercises provided during therapy sessions    ASSESSMENT      Wendy with presentation in R side flexion and L lateral glide at C2-C5 after falling a sleep in bad bed at work last night and waking up stiff and guarding.  Pt with hypomobility from mid thoracic to upper cervical and overall mild muscle guarding. R masseter with moderate guarding as well.  Pt tolerated session but limited with mobility on departure even after mobs with only moderate progress today.     Wendy is progressing well towards her goals.   Pt prognosis is Good.     Pt will continue to benefit from skilled outpatient physical therapy to address the deficits listed in the problem list box on initial evaluation, provide pt/family education and to maximize pt's level of independence in the home and community environment.     Pt's spiritual, cultural and educational needs considered and pt agreeable to plan of care and goals.     Anticipated barriers to physical therapy: " none    Goals:   Short Term Goals: 6 weeks  1.Report decreased neck/head pain  <   / =  4  /10  to increase tolerance for ADL's, work activities, and self care. MET, 04/21/2022  2. Increase cervical ROM by 5-10 degrees in order to perform ADLs with decreased difficulty. Progressing, 04/21/2022  3. Increase strength in B shoulders/scapular stabilizers by 1/3 MMT grade to increase tolerance for ADL and work activities. Not assessed   4. Pt to tolerate HEP to improve ROM and independence with ADL's. Met, 04/21/2022     Long Term Goals: 10 weeks  1.Report decreased neck/head pain  <   / =  0-1 /10  to increase tolerance for ADL's, work activities, and self care. MET 4/7/22  2. Increase UE/neck flexibility in order to improve posture.    3.Increased strength in B shoulders/scapular stabilizers to >/= 4/5 MMT grade to increase tolerance for ADL and work activities.       PLAN     Continue scapular and cervical strengthening , update HEP, thoracic mobility     Idalia Olvera, PT

## 2022-04-26 ENCOUNTER — CLINICAL SUPPORT (OUTPATIENT)
Dept: REHABILITATION | Facility: HOSPITAL | Age: 42
End: 2022-04-26
Payer: COMMERCIAL

## 2022-04-26 DIAGNOSIS — R29.898 DECREASED ROM OF NECK: Primary | ICD-10-CM

## 2022-04-26 PROCEDURE — 97140 MANUAL THERAPY 1/> REGIONS: CPT | Mod: PO

## 2022-04-26 NOTE — PROGRESS NOTES
"  [facility]   Physical Therapy Treatment Note     Name: Wendy Munoz  Clinic Number: [id number]    Therapy Diagnosis:   Encounter Diagnosis   Name Primary?    Decreased ROM of neck Yes     Physician: Jose Melendez MD    Visit Date: 4/26/2022    Physician Orders: PT Eval and Treat   Medical Diagnosis from Referral:   G43.719 (ICD-10-CM) - Chronic migraine without aura, intractable, without status migrainosus   M79.18 (ICD-10-CM) - Cervical myofascial pain syndrome         Evaluation Date: 11/29/2021  Authorization Period Expiration: 6/01/2022  Plan of Care Expiration: 3/31/2022  Progress Note Due: 2/05/2022  Visit # / Visits authorized: 12 / 20 (new auth)  FOTO: tbd/ tbd        Precautions: Standard      Time In: 1100  Time Out: 1140  Total Billable Time:  40 minutes (MT 3)      SUBJECTIVE     Pt reports: that her neck isnt feeling that great. No relief after last treatment session with 2 headaches and neck pain all weekend.    She was compliant with home exercise program.  Response to previous treatment: no change   Functional change: ongoing     Pain: 8/10  Location: neck    OBJECTIVE     Objective Measures updated at progress report unless specified.       Treatment     Wendy received the treatments listed below:      therapeutic exercises to develop strength, endurance, ROM, flexibility and posture for 0 minutes including:    B shoulder no money, YTB: 2x10  Serratus wall slides, YTB: 2x8  IR/ER with YTB: 2x12  Prone chin tuck with cervical lift: 2x10, 3"  Supine Y's with YTB: 3x10  Supine chin tucks x 20 3"  Foam roll series: 10x10"each  Side lying open books x 10, 5" hold  Shoulder extension GTB 2 x 10   Shoulder rows GTB 2 x 10    Levator stretch with strap 3 x 30 secs  Upper trap stretch with strap 3 x 30 secs       manual therapy techniques: Soft tissue Mobilization, Joint Mobilizations, and Dry Needling were applied for 40 minutes, including:    -Prone Grade III " "Left UPA right T1-T5 mobs: 3x30"  -Instrument assisted soft tissue mobilization to posterior neck  -Cervical traction: 8 minutes      Patient Education and Home Exercises     Home Exercises Provided and Patient Education Provided     Education:  -HEP  -theracane and self release to levator scapulae, upper trapezius, and suboccipital muscles     Written Home Exercises Provided: Patient instructed to cont prior HEP. Exercises were reviewed and Wendy was able to demonstrate them prior to the end of the session.  Wendy demonstrated good  understanding of the education provided. See EMR under Patient Instructions for exercises provided during therapy sessions    ASSESSMENT      Wendy with no change in symptoms since last treatment session with 2 headaches. Responded well to instrument assisted soft tissue mobilization to posterior chain with formation of petechiae.         Wendy is progressing well towards her goals.   Pt prognosis is Good.     Pt will continue to benefit from skilled outpatient physical therapy to address the deficits listed in the problem list box on initial evaluation, provide pt/family education and to maximize pt's level of independence in the home and community environment.     Pt's spiritual, cultural and educational needs considered and pt agreeable to plan of care and goals.     Anticipated barriers to physical therapy: none    Goals:   Short Term Goals: 6 weeks  1.Report decreased neck/head pain  <   / =  4  /10  to increase tolerance for ADL's, work activities, and self care. MET, 04/26/2022  2. Increase cervical ROM by 5-10 degrees in order to perform ADLs with decreased difficulty. Progressing, 04/26/2022  3. Increase strength in B shoulders/scapular stabilizers by 1/3 MMT grade to increase tolerance for ADL and work activities. Not assessed   4. Pt to tolerate HEP to improve ROM and independence with ADL's. Met, 04/26/2022     Long Term Goals: 10 weeks  1.Report decreased neck/head pain "  <   / =  0-1 /10  to increase tolerance for ADL's, work activities, and self care. MET 4/7/22  2. Increase UE/neck flexibility in order to improve posture.    3.Increased strength in B shoulders/scapular stabilizers to >/= 4/5 MMT grade to increase tolerance for ADL and work activities.       PLAN     Continue scapular and cervical strengthening , update HEP, thoracic mobility     Awa Langston, PT

## 2022-05-03 ENCOUNTER — PATIENT MESSAGE (OUTPATIENT)
Dept: RESEARCH | Facility: HOSPITAL | Age: 42
End: 2022-05-03
Payer: COMMERCIAL

## 2022-05-16 ENCOUNTER — PATIENT MESSAGE (OUTPATIENT)
Dept: OBSTETRICS AND GYNECOLOGY | Facility: CLINIC | Age: 42
End: 2022-05-16
Payer: COMMERCIAL

## 2022-05-16 DIAGNOSIS — Z91.89 AT RISK FOR BREAST CANCER: Primary | ICD-10-CM

## 2022-05-17 ENCOUNTER — PATIENT MESSAGE (OUTPATIENT)
Dept: HEMATOLOGY/ONCOLOGY | Facility: CLINIC | Age: 42
End: 2022-05-17
Payer: COMMERCIAL

## 2022-05-17 ENCOUNTER — TELEPHONE (OUTPATIENT)
Dept: HEMATOLOGY/ONCOLOGY | Facility: CLINIC | Age: 42
End: 2022-05-17
Payer: COMMERCIAL

## 2022-05-17 NOTE — TELEPHONE ENCOUNTER
----- Message from Tru Russo NP sent at 5/17/2022  4:28 PM CDT -----  Regarding: RE: Appiontment MRI  Hi Dr. Oshea,   Of course, we will get her scheduled. I do not see where she tried to reach out to me but will call her.   Also, To my knowledge, we do not have a shortage of gadolinium contrast which is what is used with MRI. I'll cc the schedulers and my nurse to get her in for July.   Take care,   ALEJANDRA    Schedulers or Ben,   Patient is due to see me with mRI breast in July 2022. Please get her scheduled as requested.   Thank you,   PJ    ----- Message -----  From: Parul Oshea MD  Sent: 5/17/2022   3:44 PM CDT  To: Tru Russo NP  Subject: Appiontment MRI                                  Can you help this patient get scheduled for yearly visit with you and let her know thoughts on MRI with the contrast shortage.  Thanks,  Dr. Oshea

## 2022-05-18 ENCOUNTER — PATIENT MESSAGE (OUTPATIENT)
Dept: OBSTETRICS AND GYNECOLOGY | Facility: CLINIC | Age: 42
End: 2022-05-18
Payer: COMMERCIAL

## 2022-05-18 ENCOUNTER — TELEPHONE (OUTPATIENT)
Dept: HEMATOLOGY/ONCOLOGY | Facility: CLINIC | Age: 42
End: 2022-05-18
Payer: COMMERCIAL

## 2022-05-26 ENCOUNTER — OFFICE VISIT (OUTPATIENT)
Dept: PRIMARY CARE CLINIC | Facility: CLINIC | Age: 42
End: 2022-05-26
Payer: COMMERCIAL

## 2022-05-26 VITALS
HEART RATE: 65 BPM | OXYGEN SATURATION: 99 % | RESPIRATION RATE: 17 BRPM | HEIGHT: 69 IN | DIASTOLIC BLOOD PRESSURE: 62 MMHG | BODY MASS INDEX: 22.3 KG/M2 | SYSTOLIC BLOOD PRESSURE: 105 MMHG | WEIGHT: 150.56 LBS | TEMPERATURE: 98 F

## 2022-05-26 DIAGNOSIS — K27.9 PUD (PEPTIC ULCER DISEASE): ICD-10-CM

## 2022-05-26 DIAGNOSIS — M25.871 SESAMOIDITIS OF RIGHT FOOT: ICD-10-CM

## 2022-05-26 DIAGNOSIS — Z13.220 SCREENING FOR LIPOID DISORDERS: ICD-10-CM

## 2022-05-26 DIAGNOSIS — N39.3 STRESS INCONTINENCE: ICD-10-CM

## 2022-05-26 DIAGNOSIS — Z80.3 FAMILY HISTORY OF BREAST CANCER: ICD-10-CM

## 2022-05-26 DIAGNOSIS — G43.719 CHRONIC MIGRAINE WITHOUT AURA, INTRACTABLE, WITHOUT STATUS MIGRAINOSUS: ICD-10-CM

## 2022-05-26 DIAGNOSIS — G24.3 CERVICAL DYSTONIA: ICD-10-CM

## 2022-05-26 DIAGNOSIS — Z00.00 NORMAL PHYSICAL EXAM, ROUTINE: Primary | ICD-10-CM

## 2022-05-26 PROCEDURE — 3078F PR MOST RECENT DIASTOLIC BLOOD PRESSURE < 80 MM HG: ICD-10-PCS | Mod: CPTII,S$GLB,, | Performed by: INTERNAL MEDICINE

## 2022-05-26 PROCEDURE — 3074F PR MOST RECENT SYSTOLIC BLOOD PRESSURE < 130 MM HG: ICD-10-PCS | Mod: CPTII,S$GLB,, | Performed by: INTERNAL MEDICINE

## 2022-05-26 PROCEDURE — 3074F SYST BP LT 130 MM HG: CPT | Mod: CPTII,S$GLB,, | Performed by: INTERNAL MEDICINE

## 2022-05-26 PROCEDURE — 99214 OFFICE O/P EST MOD 30 MIN: CPT | Mod: S$GLB,,, | Performed by: INTERNAL MEDICINE

## 2022-05-26 PROCEDURE — 99999 PR PBB SHADOW E&M-EST. PATIENT-LVL V: CPT | Mod: PBBFAC,,, | Performed by: INTERNAL MEDICINE

## 2022-05-26 PROCEDURE — 1159F PR MEDICATION LIST DOCUMENTED IN MEDICAL RECORD: ICD-10-PCS | Mod: CPTII,S$GLB,, | Performed by: INTERNAL MEDICINE

## 2022-05-26 PROCEDURE — 3008F PR BODY MASS INDEX (BMI) DOCUMENTED: ICD-10-PCS | Mod: CPTII,S$GLB,, | Performed by: INTERNAL MEDICINE

## 2022-05-26 PROCEDURE — 1160F RVW MEDS BY RX/DR IN RCRD: CPT | Mod: CPTII,S$GLB,, | Performed by: INTERNAL MEDICINE

## 2022-05-26 PROCEDURE — 99214 PR OFFICE/OUTPT VISIT, EST, LEVL IV, 30-39 MIN: ICD-10-PCS | Mod: S$GLB,,, | Performed by: INTERNAL MEDICINE

## 2022-05-26 PROCEDURE — 1159F MED LIST DOCD IN RCRD: CPT | Mod: CPTII,S$GLB,, | Performed by: INTERNAL MEDICINE

## 2022-05-26 PROCEDURE — 99999 PR PBB SHADOW E&M-EST. PATIENT-LVL V: ICD-10-PCS | Mod: PBBFAC,,, | Performed by: INTERNAL MEDICINE

## 2022-05-26 PROCEDURE — 3008F BODY MASS INDEX DOCD: CPT | Mod: CPTII,S$GLB,, | Performed by: INTERNAL MEDICINE

## 2022-05-26 PROCEDURE — 1160F PR REVIEW ALL MEDS BY PRESCRIBER/CLIN PHARMACIST DOCUMENTED: ICD-10-PCS | Mod: CPTII,S$GLB,, | Performed by: INTERNAL MEDICINE

## 2022-05-26 PROCEDURE — 3078F DIAST BP <80 MM HG: CPT | Mod: CPTII,S$GLB,, | Performed by: INTERNAL MEDICINE
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
## 2022-05-31 DIAGNOSIS — Z80.3 FAMILY HISTORY OF BREAST CANCER: ICD-10-CM

## 2022-05-31 DIAGNOSIS — Z91.89 AT HIGH RISK FOR BREAST CANCER: ICD-10-CM

## 2022-05-31 DIAGNOSIS — R92.30 DENSE BREAST TISSUE ON MAMMOGRAM: ICD-10-CM

## 2022-05-31 DIAGNOSIS — Z91.89 INCREASED RISK OF BREAST CANCER: Primary | ICD-10-CM

## 2022-06-07 ENCOUNTER — PATIENT MESSAGE (OUTPATIENT)
Dept: NEUROLOGY | Facility: CLINIC | Age: 42
End: 2022-06-07
Payer: COMMERCIAL

## 2022-06-08 RX ORDER — TIZANIDINE 2 MG/1
2-4 TABLET ORAL NIGHTLY
Qty: 60 TABLET | Refills: 3 | Status: SHIPPED | OUTPATIENT
Start: 2022-06-08 | End: 2022-11-02 | Stop reason: SDUPTHER

## 2022-07-13 ENCOUNTER — HOSPITAL ENCOUNTER (OUTPATIENT)
Dept: RADIOLOGY | Facility: HOSPITAL | Age: 42
Discharge: HOME | End: 2022-07-13
Attending: NURSE PRACTITIONER
Payer: COMMERCIAL

## 2022-07-13 DIAGNOSIS — R92.30 DENSE BREAST TISSUE ON MAMMOGRAM: ICD-10-CM

## 2022-07-13 DIAGNOSIS — Z80.3 FAMILY HISTORY OF BREAST CANCER: ICD-10-CM

## 2022-07-13 DIAGNOSIS — Z91.89 AT HIGH RISK FOR BREAST CANCER: ICD-10-CM

## 2022-07-13 PROCEDURE — A9577 INJ MULTIHANCE: HCPCS | Performed by: NURSE PRACTITIONER

## 2022-07-13 PROCEDURE — 25500020 PHARM REV CODE 255: Performed by: NURSE PRACTITIONER

## 2022-07-13 PROCEDURE — 77049 MRI BREAST W/WO CONTRAST, W/CAD, BILATERAL: ICD-10-PCS | Mod: 26,,, | Performed by: RADIOLOGY

## 2022-07-13 PROCEDURE — 77049 MRI BREAST C-+ W/CAD BI: CPT | Mod: 26,,, | Performed by: RADIOLOGY

## 2022-07-13 PROCEDURE — 77049 MRI BREAST C-+ W/CAD BI: CPT | Mod: TC

## 2022-07-13 RX ADMIN — GADOBENATE DIMEGLUMINE 15 ML: 529 INJECTION, SOLUTION INTRAVENOUS at 10:07

## 2022-07-18 DIAGNOSIS — N92.0 MENORRHAGIA WITH REGULAR CYCLE: Primary | ICD-10-CM

## 2022-07-18 RX ORDER — TRANEXAMIC ACID 650 MG/1
1300 TABLET ORAL 3 TIMES DAILY
Qty: 30 TABLET | Refills: 0 | Status: SHIPPED | OUTPATIENT
Start: 2022-07-18 | End: 2022-07-23

## 2022-07-20 ENCOUNTER — OFFICE VISIT (OUTPATIENT)
Dept: HEMATOLOGY/ONCOLOGY | Facility: CLINIC | Age: 42
End: 2022-07-20
Payer: COMMERCIAL

## 2022-07-20 VITALS
TEMPERATURE: 98 F | BODY MASS INDEX: 21.91 KG/M2 | DIASTOLIC BLOOD PRESSURE: 68 MMHG | HEART RATE: 63 BPM | HEIGHT: 69 IN | OXYGEN SATURATION: 99 % | WEIGHT: 147.94 LBS | RESPIRATION RATE: 18 BRPM | SYSTOLIC BLOOD PRESSURE: 111 MMHG

## 2022-07-20 DIAGNOSIS — Z91.89 AT HIGH RISK FOR BREAST CANCER: Primary | ICD-10-CM

## 2022-07-20 DIAGNOSIS — R92.30 DENSE BREAST TISSUE ON MAMMOGRAM: ICD-10-CM

## 2022-07-20 DIAGNOSIS — Z80.3 FAMILY HISTORY OF BREAST CANCER: ICD-10-CM

## 2022-07-20 PROCEDURE — 3074F PR MOST RECENT SYSTOLIC BLOOD PRESSURE < 130 MM HG: ICD-10-PCS | Mod: CPTII,S$GLB,, | Performed by: NURSE PRACTITIONER

## 2022-07-20 PROCEDURE — 3008F PR BODY MASS INDEX (BMI) DOCUMENTED: ICD-10-PCS | Mod: CPTII,S$GLB,, | Performed by: NURSE PRACTITIONER

## 2022-07-20 PROCEDURE — 99999 PR PBB SHADOW E&M-EST. PATIENT-LVL IV: ICD-10-PCS | Mod: PBBFAC,,, | Performed by: NURSE PRACTITIONER

## 2022-07-20 PROCEDURE — 3074F SYST BP LT 130 MM HG: CPT | Mod: CPTII,S$GLB,, | Performed by: NURSE PRACTITIONER

## 2022-07-20 PROCEDURE — 3078F DIAST BP <80 MM HG: CPT | Mod: CPTII,S$GLB,, | Performed by: NURSE PRACTITIONER

## 2022-07-20 PROCEDURE — 1159F MED LIST DOCD IN RCRD: CPT | Mod: CPTII,S$GLB,, | Performed by: NURSE PRACTITIONER

## 2022-07-20 PROCEDURE — 99214 PR OFFICE/OUTPT VISIT, EST, LEVL IV, 30-39 MIN: ICD-10-PCS | Mod: S$GLB,,, | Performed by: NURSE PRACTITIONER

## 2022-07-20 PROCEDURE — 3078F PR MOST RECENT DIASTOLIC BLOOD PRESSURE < 80 MM HG: ICD-10-PCS | Mod: CPTII,S$GLB,, | Performed by: NURSE PRACTITIONER

## 2022-07-20 PROCEDURE — 99214 OFFICE O/P EST MOD 30 MIN: CPT | Mod: S$GLB,,, | Performed by: NURSE PRACTITIONER

## 2022-07-20 PROCEDURE — 3008F BODY MASS INDEX DOCD: CPT | Mod: CPTII,S$GLB,, | Performed by: NURSE PRACTITIONER

## 2022-07-20 PROCEDURE — 1159F PR MEDICATION LIST DOCUMENTED IN MEDICAL RECORD: ICD-10-PCS | Mod: CPTII,S$GLB,, | Performed by: NURSE PRACTITIONER

## 2022-07-20 PROCEDURE — 99999 PR PBB SHADOW E&M-EST. PATIENT-LVL IV: CPT | Mod: PBBFAC,,, | Performed by: NURSE PRACTITIONER

## 2022-07-20 NOTE — PROGRESS NOTES
CC:   Increased lifetime risk of breast cancer    HPI:   Wendy Munoz presents for follow up for increased risk of breast cancer.   Mom had genetic testing and was negative for any mutations.   Today, Feels good and no complaints.   No breast concerns.  MRI reports extremely dense breast    2022 MRI breast   Impression:  Normal exam.     BI-RADS Category:   Overall: 1 - Negative     Recommendation:  Return to annual screening mammogram schedule is recommended     Your estimated lifetime risk of breast cancer (to age 85) based on Tyrer-Cuzick risk assessment model is Tyrer-Cuzick: 23.4 %. According to the American Cancer Society, patients with a lifetime breast cancer risk of 20% or higher might benefit from supplemental screening tests.     High Risk Breast cancer specific history:  - Age: 42 y.o.   - Height:  5'9  - Weight: 154 lbs  - Breast density per BI-RADS:  c-heterogeneously dense> d- extremely dense  - Age at menarche:  14 yo  - Number of pregnancies: ; age of first live birth: 32  - History of breast feeding: Yes - 19 months with 1st child; 2 years with 2nd child; 3 years with last child.    - Age at menopause, if applicable:  N/A   -Uterus and ovaries intact: Yes  - HRT: No  - Genetic testing: No  - Personal history of cancer: basal cell  - Previous chest radiation exposure between ages 10-30 years old: No  - Personal history of breast biopsy: No  - Ashkenazi Scientologist Inheritance: No  - Family history of cancer:    Mom- breast cancer dx 54, currently 70- brca negative  Few woman in family.   Dad- prostate cancer.    Social History:  Tobacco use:  no  Alcohol use:  social  Exercise regimen: 2 times a week- running, yoga  Employment: OB here at [employer]         Past Medical   Past Medical History:   Diagnosis Date    Asthma 2001    Exercise induced    Basal cell carcinoma     History of COVID-19 2021    Metatarsalgia of right foot     Migraine     PUD (peptic ulcer disease)      Sesamoiditis of right foot      Patient Active Problem List   Diagnosis    Migraine headache    Increased risk of breast cancer    Dense breast tissue on mammogram    Family history of breast cancer    Cervical myofascial pain syndrome    Chronic migraine without aura, intractable, without status migrainosus    Decreased ROM of neck    Sesamoiditis of right foot     Social History   Social History     Tobacco Use    Smoking status: Never Smoker    Smokeless tobacco: Never Used   Substance Use Topics    Alcohol use: Yes     Alcohol/week: 1.0 standard drink     Types: 1 Glasses of wine per week     Comment: few times a week    Drug use: Never     Family History  Family History   Problem Relation Age of Onset    Breast cancer Mother 54        No genetic testing    Hypertension Mother     Hypertension Father     Prostate cancer Father     Hyperlipidemia Father     Melanoma Neg Hx      Medications    Current Outpatient Medications:     fish oil-omega-3 fatty acids 300-1,000 mg capsule, Take by mouth once daily., Disp: , Rfl:     magnesium 250 mg Tab, Take by mouth once., Disp: , Rfl:     multivitamin (THERAGRAN) per tablet, Take 1 tablet by mouth once daily., Disp: , Rfl:     rizatriptan (MAXALT) 10 MG tablet, Take 1 tablet (10 mg total) by mouth as needed for Migraine (May repeat after 2 hours x 2, if needed.  Not to exceed 30mg/24 hours)., Disp: 27 tablet, Rfl: 3    tiZANidine (ZANAFLEX) 2 MG tablet, Take 1-2 tablets (2-4 mg total) by mouth every evening., Disp: 60 tablet, Rfl: 3    tranexamic acid (LYSTEDA) 650 mg tablet, Take 2 tablets (1,300 mg total) by mouth 3 (three) times daily. for 5 days, Disp: 30 tablet, Rfl: 0    albuterol (PROAIR HFA) 90 mcg/actuation inhaler, Inhale 2 puffs into the lungs every 6 (six) hours as needed for Wheezing. Rescue, Disp: 18 g, Rfl: 1    Current Facility-Administered Medications:     albuterol inhaler 2 puff, 2 puff, Inhalation, Q20 Min PRNYasir  "MD Sindy  Allergies  Review of patient's allergies indicates:   Allergen Reactions    Latex, natural rubber      Rash/itching       Review of Systems       See above   All other systems reviewed and are negative.    Objective:      Vitals:   Vitals:    07/20/22 0919   BP: 111/68   Pulse: 63   Resp: 18   Temp: 97.7 °F (36.5 °C)   TempSrc: Oral   SpO2: 99%   Weight: 67.1 kg (147 lb 14.9 oz)   Height: 5' 9" (1.753 m)     BMI: Body mass index is 21.85 kg/m².   Body surface area is 1.81 meters squared.    Physical Exam  Vitals signs reviewed.   Constitutional:  Normal appearance. NAD.   HENT: Normocephalic.   Eyes: Pupils are equal, round, and reactive to light.   Cardiovascular: RRR  Pulmonary: Pulmonary effort is normal. CTA  Musculoskeletal: Normal range of motion.   Lymphadenopathy: No cervical adenopathy. No axillary adenopathy.   Skin: Skin is warm and dry.   Neurological:  She is alert and oriented to person, place, and time.   Psychiatric: Mood normal.     Breast Exam: Bilateral breast normal. No masses, no tenderness, no skin or nipple abnormalities.  No lymphadenopathy.       Laboratory Data: reviewed most recent   Imaging: reviewed most recent        Assessment:     1. At high risk for breast cancer    2. Dense breast tissue on mammogram    3. Family history of breast cancer          Plan:     Doing well, clinically negative.  Continue with alternating annual mammogram and annual breast MRI along with semiannual CBEs.  Breast awareness monthly   Patient will follow up with PCP or GYN for one semiannual CBE along with annual mammogram in January and will RTC here for one semiannual CBE along with annual breast imaging in July. Note- goes out of town the last 2 weeks of July      MMG due 1/2023  RTC in early July 2023 to see me either at [facility] or on 3rd floor with a MRI breast. Call to make appts please.      Route Chart for Scheduling    Med Onc Chart Routing      Follow up with physician    Follow up " with IDANIA . RTC in early July 2023 to see me either at [facility] or on 3rd floor with a MRI breast. Call to make appts please.     Infusion scheduling note    Injection scheduling note    Labs    Imaging    Pharmacy appointment    Other referrals             Patient is in agreement with the proposed treatment plan. All questions were answered to the patient's satisfaction. Pt knows to call clinic for any new or worsening symptoms and if anything is needed before the next clinic visit.      FRED Hein-SALVADOR  Hematology & Oncology  [address] [place] [id number]  ph. [phone]  Fax. [phone]     Face to Face time with patient:25 minutes  30 minutes of total time spent on the encounter, which includes face to face time and non-face to face time preparing to see the patient (eg, review of tests), Obtaining and/or reviewing separately obtained history, Documenting clinical information in the electronic or other health record, Independently interpreting results (not separately reported) and communicating results to the patient/family/caregiver, or Care coordination (not separately reported).

## 2022-08-15 RX ORDER — RIZATRIPTAN BENZOATE 10 MG/1
10 TABLET ORAL
Qty: 27 TABLET | Refills: 3 | Status: SHIPPED | OUTPATIENT
Start: 2022-08-15 | End: 2023-06-28

## 2022-09-02 ENCOUNTER — OFFICE VISIT (OUTPATIENT)
Dept: ORTHOPEDICS | Facility: CLINIC | Age: 42
End: 2022-09-02
Payer: COMMERCIAL

## 2022-09-02 ENCOUNTER — HOSPITAL ENCOUNTER (OUTPATIENT)
Dept: RADIOLOGY | Facility: HOSPITAL | Age: 42
Discharge: HOME | End: 2022-09-02
Attending: ORTHOPAEDIC SURGERY
Payer: COMMERCIAL

## 2022-09-02 VITALS — BODY MASS INDEX: 21.47 KG/M2 | HEIGHT: 69 IN | WEIGHT: 144.94 LBS

## 2022-09-02 DIAGNOSIS — M25.559 HIP PAIN: ICD-10-CM

## 2022-09-02 DIAGNOSIS — M25.559 HIP PAIN: Primary | ICD-10-CM

## 2022-09-02 PROCEDURE — 73502 X-RAY EXAM HIP UNI 2-3 VIEWS: CPT | Mod: TC,RT

## 2022-09-02 PROCEDURE — 1159F PR MEDICATION LIST DOCUMENTED IN MEDICAL RECORD: ICD-10-PCS | Mod: CPTII,S$GLB,, | Performed by: ORTHOPAEDIC SURGERY

## 2022-09-02 PROCEDURE — 99213 OFFICE O/P EST LOW 20 MIN: CPT | Mod: S$GLB,,, | Performed by: ORTHOPAEDIC SURGERY

## 2022-09-02 PROCEDURE — 99999 PR PBB SHADOW E&M-EST. PATIENT-LVL III: ICD-10-PCS | Mod: PBBFAC,,, | Performed by: ORTHOPAEDIC SURGERY

## 2022-09-02 PROCEDURE — 99999 PR PBB SHADOW E&M-EST. PATIENT-LVL III: CPT | Mod: PBBFAC,,, | Performed by: ORTHOPAEDIC SURGERY

## 2022-09-02 PROCEDURE — 3008F BODY MASS INDEX DOCD: CPT | Mod: CPTII,S$GLB,, | Performed by: ORTHOPAEDIC SURGERY

## 2022-09-02 PROCEDURE — 73502 X-RAY EXAM HIP UNI 2-3 VIEWS: CPT | Mod: 26,RT,, | Performed by: RADIOLOGY

## 2022-09-02 PROCEDURE — 73502 XR HIP WITH PELVIS WHEN PERFORMED, 2 OR 3  VIEWS RIGHT: ICD-10-PCS | Mod: 26,RT,, | Performed by: RADIOLOGY

## 2022-09-02 PROCEDURE — 1159F MED LIST DOCD IN RCRD: CPT | Mod: CPTII,S$GLB,, | Performed by: ORTHOPAEDIC SURGERY

## 2022-09-02 PROCEDURE — 1160F RVW MEDS BY RX/DR IN RCRD: CPT | Mod: CPTII,S$GLB,, | Performed by: ORTHOPAEDIC SURGERY

## 2022-09-02 PROCEDURE — 99213 PR OFFICE/OUTPT VISIT, EST, LEVL III, 20-29 MIN: ICD-10-PCS | Mod: S$GLB,,, | Performed by: ORTHOPAEDIC SURGERY

## 2022-09-02 PROCEDURE — 3008F PR BODY MASS INDEX (BMI) DOCUMENTED: ICD-10-PCS | Mod: CPTII,S$GLB,, | Performed by: ORTHOPAEDIC SURGERY

## 2022-09-02 PROCEDURE — 1160F PR REVIEW ALL MEDS BY PRESCRIBER/CLIN PHARMACIST DOCUMENTED: ICD-10-PCS | Mod: CPTII,S$GLB,, | Performed by: ORTHOPAEDIC SURGERY

## 2022-09-02 RX ORDER — METHYLPREDNISOLONE 4 MG/1
TABLET ORAL
Qty: 21 EACH | Refills: 0 | Status: SHIPPED | OUTPATIENT
Start: 2022-09-02 | End: 2022-09-23

## 2022-09-05 NOTE — PROGRESS NOTES
Subjective:       Patient ID: Wendy Munoz is a 42 y.o. female.    Chief Complaint:   Pain of the Right Hip  She comes in for right hip pain.  This is about 3/10 on a regular basis, occasionally worse.  She woke up with the pain about 2-3 weeks ago.  She has for a while been seeing [facility] for sesamoiditis.  She has had to stop trying to run because of the hip pain.  She went on a small hike with new shoes recently and wonders if this had something to do with that.  Her main problem is abducting the hip to get out of her car.  She can not take NSAIDs because of history of gastric ulcer although she has been trying some with some co therapy.  No knee pain, distal numbness and tingling.  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    Past Medical History:   Diagnosis Date    Asthma 2001    Exercise induced    Basal cell carcinoma     History of COVID-19 08/2021    Metatarsalgia of right foot     Migraine     PUD (peptic ulcer disease)     Sesamoiditis of right foot      Past Surgical History:   Procedure Laterality Date    ABDOMINAL SURGERY  9/3015    Umbilical, bilateral inguinal  hernia repair    HERNIA REPAIR      umbilical, inguinal hernia repair    HYSTEROSCOPY  11/2011    Hysteroscopy polypectomy    HYSTEROSCOPY W/ POLYPECTOMY      PELVIC LAPAROSCOPY  12/2016    IUD removal    REMOVAL OF INTRAUTERINE DEVICE (IUD)      laparoscopy for perforated IUD    SKIN BIOPSY       Family History   Problem Relation Age of Onset    Breast cancer Mother 54        No genetic testing    Hypertension Mother     Hypertension Father     Prostate cancer Father     Hyperlipidemia Father     Melanoma Neg Hx      Social History     Socioeconomic History    Marital status:    Tobacco Use    Smoking status: Never    Smokeless tobacco: Never   Substance and Sexual Activity    Alcohol use: Yes     Alcohol/week: 1.0 standard drink     Types: 1 Glasses of wine per week     Comment: few times  a week    Drug use: Never    Sexual activity: Yes     Partners: Male     Birth control/protection: Partner-Vasectomy   Other Topics Concern    Are you pregnant or think you may be? No    Breast-feeding No     Social Determinants of Health     Financial Resource Strain: Low Risk     Difficulty of Paying Living Expenses: Not hard at all   Food Insecurity: No Food Insecurity    Worried About Running Out of Food in the Last Year: Never true    Ran Out of Food in the Last Year: Never true   Transportation Needs: Unmet Transportation Needs    Lack of Transportation (Medical): Yes    Lack of Transportation (Non-Medical): No   Physical Activity: Insufficiently Active    Days of Exercise per Week: 3 days    Minutes of Exercise per Session: 30 min   Stress: No Stress Concern Present    Feeling of Stress : Only a little   Social Connections: Unknown    Frequency of Communication with Friends and Family: Three times a week    Frequency of Social Gatherings with Friends and Family: Three times a week    Active Member of Clubs or Organizations: Yes    Attends Club or Organization Meetings: 1 to 4 times per year    Marital Status:    Housing Stability: Low Risk     Unable to Pay for Housing in the Last Year: No    Number of Places Lived in the Last Year: 1    Unstable Housing in the Last Year: No       Current Outpatient Medications   Medication Sig Dispense Refill    albuterol (PROAIR HFA) 90 mcg/actuation inhaler Inhale 2 puffs into the lungs every 6 (six) hours as needed for Wheezing. Rescue 18 g 1    fish oil-omega-3 fatty acids 300-1,000 mg capsule Take by mouth once daily.      magnesium 250 mg Tab Take by mouth once.      methylPREDNISolone (MEDROL DOSEPACK) 4 mg tablet use as directed 21 each 0    multivitamin (THERAGRAN) per tablet Take 1 tablet by mouth once daily.      rizatriptan (MAXALT) 10 MG tablet Take 1 tablet (10 mg total) by mouth as needed for Migraine (May repeat after 2 hours x 2, if needed.  Not to  "exceed 30mg/24 hours). 27 tablet 3    tiZANidine (ZANAFLEX) 2 MG tablet Take 1-2 tablets (2-4 mg total) by mouth every evening. 60 tablet 3     Current Facility-Administered Medications   Medication Dose Route Frequency Provider Last Rate Last Admin    albuterol inhaler 2 puff  2 puff Inhalation Q20 Min PRN Yasir Callahan MD         Review of patient's allergies indicates:   Allergen Reactions    Latex, natural rubber      Rash/itching         Objective:      Vitals:    09/02/22 1117   Weight: 65.8 kg (144 lb 15.2 oz)   Height: 5' 9" (1.753 m)     Physical Exam  On exam she has normal gait.  Positive FADIR test.  Negative MILAGROS test.  Positive C sign.  Knee benign without tenderness or effusion.  Distal neurovascular normal.  Imaging Review:   Plain x-rays are unremarkable, with the exception of a possible faint, small calcification in the labrum.  She does have cam morphology  Assessment:   Likely right hip labral tear from ONDINA  Plan:       We discussed going ahead and getting an MR arthrogram, but ultimately decided to try treatment with a Medrol Dosepak and then see where she is symptomatically before moving onto imaging.  The patient's pathophysiology was explained in detail with reference to x-rays, models, other visual aids as appropriate.  Treatment options were discussed in detail.  Questions were invited and answered to the patient's satisfaction.    Alexx Bailey MD  Orthopaedic Surgery      "

## 2022-09-09 ENCOUNTER — PATIENT MESSAGE (OUTPATIENT)
Dept: ORTHOPEDICS | Facility: CLINIC | Age: 42
End: 2022-09-09
Payer: COMMERCIAL

## 2022-09-10 DIAGNOSIS — S73.191A TEAR OF RIGHT ACETABULAR LABRUM, INITIAL ENCOUNTER: Primary | ICD-10-CM

## 2022-09-21 ENCOUNTER — IMMUNIZATION (OUTPATIENT)
Dept: INTERNAL MEDICINE | Facility: CLINIC | Age: 42
End: 2022-09-21
Payer: COMMERCIAL

## 2022-09-21 DIAGNOSIS — Z23 NEED FOR VACCINATION: Primary | ICD-10-CM

## 2022-09-21 PROCEDURE — 91312 COVID-19, MRNA, LNP-S, BIVALENT BOOSTER, PF, 30 MCG/0.3 ML DOSE: ICD-10-PCS | Mod: S$GLB,,, | Performed by: INTERNAL MEDICINE

## 2022-09-21 PROCEDURE — 0124A COVID-19, MRNA, LNP-S, BIVALENT BOOSTER, PF, 30 MCG/0.3 ML DOSE: CPT | Mod: PBBFAC | Performed by: INTERNAL MEDICINE

## 2022-09-21 PROCEDURE — 91312 COVID-19, MRNA, LNP-S, BIVALENT BOOSTER, PF, 30 MCG/0.3 ML DOSE: CPT | Mod: S$GLB,,, | Performed by: INTERNAL MEDICINE

## 2022-09-24 ENCOUNTER — IMMUNIZATION (OUTPATIENT)
Dept: INTERNAL MEDICINE | Facility: CLINIC | Age: 42
End: 2022-09-24
Payer: COMMERCIAL

## 2022-09-24 PROCEDURE — 90686 IIV4 VACC NO PRSV 0.5 ML IM: CPT | Mod: S$GLB,,, | Performed by: INTERNAL MEDICINE

## 2022-09-24 PROCEDURE — 90471 IMMUNIZATION ADMIN: CPT | Mod: S$GLB,,, | Performed by: INTERNAL MEDICINE

## 2022-09-24 PROCEDURE — 90686 FLU VACCINE (QUAD) GREATER THAN OR EQUAL TO 3YO PRESERVATIVE FREE IM: ICD-10-PCS | Mod: S$GLB,,, | Performed by: INTERNAL MEDICINE

## 2022-09-24 PROCEDURE — 90471 FLU VACCINE (QUAD) GREATER THAN OR EQUAL TO 3YO PRESERVATIVE FREE IM: ICD-10-PCS | Mod: S$GLB,,, | Performed by: INTERNAL MEDICINE

## 2022-09-29 ENCOUNTER — OFFICE VISIT (OUTPATIENT)
Dept: SPORTS MEDICINE | Facility: CLINIC | Age: 42
End: 2022-09-29
Payer: COMMERCIAL

## 2022-09-29 ENCOUNTER — HOSPITAL ENCOUNTER (OUTPATIENT)
Dept: RADIOLOGY | Facility: HOSPITAL | Age: 42
Discharge: HOME | End: 2022-09-29
Attending: STUDENT IN AN ORGANIZED HEALTH CARE EDUCATION/TRAINING PROGRAM
Payer: COMMERCIAL

## 2022-09-29 VITALS
SYSTOLIC BLOOD PRESSURE: 122 MMHG | BODY MASS INDEX: 21.41 KG/M2 | HEIGHT: 69 IN | HEART RATE: 77 BPM | DIASTOLIC BLOOD PRESSURE: 76 MMHG

## 2022-09-29 DIAGNOSIS — M25.851 FEMOROACETABULAR IMPINGEMENT OF RIGHT HIP: Primary | ICD-10-CM

## 2022-09-29 DIAGNOSIS — M25.559 PAIN IN UNSPECIFIED HIP: ICD-10-CM

## 2022-09-29 DIAGNOSIS — M25.551 RIGHT HIP PAIN: ICD-10-CM

## 2022-09-29 DIAGNOSIS — M25.871 SESAMOIDITIS OF RIGHT FOOT: ICD-10-CM

## 2022-09-29 PROCEDURE — 3008F PR BODY MASS INDEX (BMI) DOCUMENTED: ICD-10-PCS | Mod: CPTII,S$GLB,, | Performed by: STUDENT IN AN ORGANIZED HEALTH CARE EDUCATION/TRAINING PROGRAM

## 2022-09-29 PROCEDURE — 1160F RVW MEDS BY RX/DR IN RCRD: CPT | Mod: CPTII,S$GLB,, | Performed by: STUDENT IN AN ORGANIZED HEALTH CARE EDUCATION/TRAINING PROGRAM

## 2022-09-29 PROCEDURE — 99204 PR OFFICE/OUTPT VISIT, NEW, LEVL IV, 45-59 MIN: ICD-10-PCS | Mod: S$GLB,,, | Performed by: STUDENT IN AN ORGANIZED HEALTH CARE EDUCATION/TRAINING PROGRAM

## 2022-09-29 PROCEDURE — 99204 OFFICE O/P NEW MOD 45 MIN: CPT | Mod: S$GLB,,, | Performed by: STUDENT IN AN ORGANIZED HEALTH CARE EDUCATION/TRAINING PROGRAM

## 2022-09-29 PROCEDURE — 3074F PR MOST RECENT SYSTOLIC BLOOD PRESSURE < 130 MM HG: ICD-10-PCS | Mod: CPTII,S$GLB,, | Performed by: STUDENT IN AN ORGANIZED HEALTH CARE EDUCATION/TRAINING PROGRAM

## 2022-09-29 PROCEDURE — 3078F PR MOST RECENT DIASTOLIC BLOOD PRESSURE < 80 MM HG: ICD-10-PCS | Mod: CPTII,S$GLB,, | Performed by: STUDENT IN AN ORGANIZED HEALTH CARE EDUCATION/TRAINING PROGRAM

## 2022-09-29 PROCEDURE — 73502 X-RAY EXAM HIP UNI 2-3 VIEWS: CPT | Mod: 26,RT,, | Performed by: RADIOLOGY

## 2022-09-29 PROCEDURE — 1159F MED LIST DOCD IN RCRD: CPT | Mod: CPTII,S$GLB,, | Performed by: STUDENT IN AN ORGANIZED HEALTH CARE EDUCATION/TRAINING PROGRAM

## 2022-09-29 PROCEDURE — 73502 XR HIP WITH PELVIS WHEN PERFORMED, 2 OR 3  VIEWS RIGHT: ICD-10-PCS | Mod: 26,RT,, | Performed by: RADIOLOGY

## 2022-09-29 PROCEDURE — 99999 PR PBB SHADOW E&M-EST. PATIENT-LVL IV: CPT | Mod: PBBFAC,,, | Performed by: STUDENT IN AN ORGANIZED HEALTH CARE EDUCATION/TRAINING PROGRAM

## 2022-09-29 PROCEDURE — 73502 X-RAY EXAM HIP UNI 2-3 VIEWS: CPT | Mod: TC,RT

## 2022-09-29 PROCEDURE — 3078F DIAST BP <80 MM HG: CPT | Mod: CPTII,S$GLB,, | Performed by: STUDENT IN AN ORGANIZED HEALTH CARE EDUCATION/TRAINING PROGRAM

## 2022-09-29 PROCEDURE — 1160F PR REVIEW ALL MEDS BY PRESCRIBER/CLIN PHARMACIST DOCUMENTED: ICD-10-PCS | Mod: CPTII,S$GLB,, | Performed by: STUDENT IN AN ORGANIZED HEALTH CARE EDUCATION/TRAINING PROGRAM

## 2022-09-29 PROCEDURE — 99999 PR PBB SHADOW E&M-EST. PATIENT-LVL IV: ICD-10-PCS | Mod: PBBFAC,,, | Performed by: STUDENT IN AN ORGANIZED HEALTH CARE EDUCATION/TRAINING PROGRAM

## 2022-09-29 PROCEDURE — 3008F BODY MASS INDEX DOCD: CPT | Mod: CPTII,S$GLB,, | Performed by: STUDENT IN AN ORGANIZED HEALTH CARE EDUCATION/TRAINING PROGRAM

## 2022-09-29 PROCEDURE — 1159F PR MEDICATION LIST DOCUMENTED IN MEDICAL RECORD: ICD-10-PCS | Mod: CPTII,S$GLB,, | Performed by: STUDENT IN AN ORGANIZED HEALTH CARE EDUCATION/TRAINING PROGRAM

## 2022-09-29 PROCEDURE — 3074F SYST BP LT 130 MM HG: CPT | Mod: CPTII,S$GLB,, | Performed by: STUDENT IN AN ORGANIZED HEALTH CARE EDUCATION/TRAINING PROGRAM

## 2022-09-29 NOTE — PROGRESS NOTES
Subjective:          Chief Complaint: Wendy Munoz is a 42 y.o. female who had concerns including Pain of the Right Hip.    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  She is referred by Dr. Bailey.  She states she has had pain for some time.  About 1 year ago she had an ankle sprain which led to sesamoiditis in the right great toe.  This is being treated by Dr. Lopez.  She did obtain new shoes, [employer]okas.  When she went for a 1 mi run in these afterwards she began experiencing sharp pain in the anterior aspect of the right hip/groin.  This has persisted since.  The pain increases with hip flexion.  This includes getting in out of a car.  Trying to sit in an [place] style position playing with her kids.  The pain also increases with quick twisting movements.  She denies any true locking in her hip but does states she has some sensations of clicking.  Denies any numbness or radicular symptoms.  Denies any back pain.    Past Medical History:   Diagnosis Date    Asthma 2001    Exercise induced    Basal cell carcinoma     History of COVID-19 08/2021    Metatarsalgia of right foot     Migraine     PUD (peptic ulcer disease)     Sesamoiditis of right foot        Current Outpatient Medications on File Prior to Visit   Medication Sig Dispense Refill    fish oil-omega-3 fatty acids 300-1,000 mg capsule Take by mouth once daily.      magnesium 250 mg Tab Take by mouth once.      multivitamin (THERAGRAN) per tablet Take 1 tablet by mouth once daily.      rizatriptan (MAXALT) 10 MG tablet Take 1 tablet (10 mg total) by mouth as needed for Migraine (May repeat after 2 hours x 2, if needed.  Not to exceed 30mg/24 hours). 27 tablet 3    tiZANidine (ZANAFLEX) 2 MG tablet Take 1-2 tablets (2-4 mg total) by mouth every evening. 60 tablet 3    albuterol (PROAIR HFA) 90 mcg/actuation inhaler Inhale 2 puffs into the lungs every 6 (six) hours as needed for Wheezing. Rescue 18 g  1     Current Facility-Administered Medications on File Prior to Visit   Medication Dose Route Frequency Provider Last Rate Last Admin    albuterol inhaler 2 puff  2 puff Inhalation Q20 Min PRN Yasir Callahan MD           Past Surgical History:   Procedure Laterality Date    ABDOMINAL SURGERY  9/3015    Umbilical, bilateral inguinal  hernia repair    HERNIA REPAIR      umbilical, inguinal hernia repair    HYSTEROSCOPY  11/2011    Hysteroscopy polypectomy    HYSTEROSCOPY W/ POLYPECTOMY      PELVIC LAPAROSCOPY  12/2016    IUD removal    REMOVAL OF INTRAUTERINE DEVICE (IUD)      laparoscopy for perforated IUD    SKIN BIOPSY         Family History   Problem Relation Age of Onset    Breast cancer Mother 54        No genetic testing    Hypertension Mother     Hypertension Father     Prostate cancer Father     Hyperlipidemia Father     Melanoma Neg Hx        Social History     Socioeconomic History    Marital status:    Tobacco Use    Smoking status: Never    Smokeless tobacco: Never   Substance and Sexual Activity    Alcohol use: Yes     Alcohol/week: 1.0 standard drink     Types: 1 Glasses of wine per week     Comment: few times a week    Drug use: Never    Sexual activity: Yes     Partners: Male     Birth control/protection: Partner-Vasectomy   Other Topics Concern    Are you pregnant or think you may be? No    Breast-feeding No     Social Determinants of Health     Financial Resource Strain: Low Risk     Difficulty of Paying Living Expenses: Not hard at all   Food Insecurity: No Food Insecurity    Worried About Running Out of Food in the Last Year: Never true    Ran Out of Food in the Last Year: Never true   Transportation Needs: Unmet Transportation Needs    Lack of Transportation (Medical): Yes    Lack of Transportation (Non-Medical): No   Physical Activity: Insufficiently Active    Days of Exercise per Week: 3 days    Minutes of Exercise per Session: 30 min   Stress: No Stress Concern Present    Feeling  of Stress : Only a little   Social Connections: Unknown    Frequency of Communication with Friends and Family: Three times a week    Frequency of Social Gatherings with Friends and Family: Three times a week    Active Member of Clubs or Organizations: Yes    Attends Club or Organization Meetings: 1 to 4 times per year    Marital Status:    Housing Stability: Low Risk     Unable to Pay for Housing in the Last Year: No    Number of Places Lived in the Last Year: 1    Unstable Housing in the Last Year: No       Review of Systems   Constitutional: Negative.   HENT: Negative.     Eyes: Negative.    Cardiovascular: Negative.    Respiratory: Negative.     Endocrine: Negative.    Hematologic/Lymphatic: Negative.    Skin: Negative.    Musculoskeletal:  Positive for joint pain. Negative for arthritis, back pain, falls, joint swelling, muscle cramps, muscle weakness, myalgias and stiffness.   Neurological: Negative.    Psychiatric/Behavioral: Negative.     Allergic/Immunologic: Negative.      Pain Related Questions  Over the past 3 days, what was your average pain during activity? (I.e. running, jogging, walking, climbing stairs, getting dressed, ect.): 7  Over the past 3 days, what was your highest pain level?: 7  Over the past 3 days, what was your lowest pain level? : 3    Other  Was the patient's HEIGHT measured or patient reported?: Patient Reported  Was the patient's WEIGHT measured or patient reported?: Measured      Objective:        General: Wendy is well-developed, well-nourished, appears stated age, in no acute distress, alert and oriented to time, place and person.     General    Nursing note and vitals reviewed.  Constitutional: She is oriented to person, place, and time. She appears well-developed and well-nourished. No distress.   HENT:   Head: Normocephalic and atraumatic.   Nose: Nose normal.   Eyes: EOM are normal.   Cardiovascular:  Intact distal pulses.            Pulmonary/Chest: Effort normal. No  respiratory distress.   Neurological: She is alert and oriented to person, place, and time.   Psychiatric: She has a normal mood and affect. Her behavior is normal. Judgment and thought content normal.     General Musculoskeletal Exam   Gait: normal       Right Knee Exam     Inspection   Alignment:  normal  Effusion: absent    Left Knee Exam     Inspection   Alignment:  normal  Effusion: absent    Right Hip Exam     Inspection   Scars: absent  Swelling: absent  Bruising: absent  No deformity of hip.  Quadriceps Atrophy:  Negative  Erythema: absent    Range of Motion   Abduction:  45   Adduction:  30   Extension:  0   Flexion:  100   External rotation:  60   Internal rotation:  15     Tests   Pain w/ forced internal rotation (MILAGROS): present  Pain w/ forced external rotation (FADIR): present  Roma: negative  Trendelenburg Test: negative  Circumduction test: negative  Stinchfield test: positive  Log Roll: negative    Other   Sensation: normal  Left Hip Exam     Inspection   Scars: absent  Swelling: absent  No deformity of hip.  Quadriceps Atrophy:  negative  Erythema: absent  Bruising: absent    Range of Motion   Abduction:  45   Adduction:  30   Extension:  0   Flexion:  120   External rotation:  70   Internal rotation: 30     Tests   Pain w/ forced internal rotation (MILAGROS): absent  Pain w/ forced external rotation (FADIR): absent  Roma: negative  Trendelenburg Test: negative  Circumduction test: negative  Stinchfield test: negative  Log Roll: negative    Other   Sensation: normal          Muscle Strength   Right Lower Extremity   Hip Abduction: 5/5   Hip Adduction: 5/5   Hip Flexion: 5/5   Ankle Dorsiflexion:  5/5   Left Lower Extremity   Hip Abduction: 5/5   Hip Adduction: 5/5   Hip Flexion: 5/5   Ankle Dorsiflexion:  5/5     Vascular Exam     Right Pulses  Dorsalis Pedis:      2+  Posterior Tibial:      2+        Left Pulses  Dorsalis Pedis:      2+  Posterior Tibial:      2+        Capillary Refill  Left Hand:  normal capillary refill        Edema  Right Upper Leg: absent  Left Upper Leg: absent    Imaging:  X-rays of the right hip from 09/29/2022 personally reviewed by me on that day.  These include AP hip, AP pelvis, bilateral modified Miles.  Joint spaces are well preserved bilaterally.  The right hip demonstrates an ischial spur sign indicating retroversion.  There is a crossover sign as well.  There is a moderate cam deformity.  Small calcification in the lateral aspect of the labrum.        Assessment:     Wendy Munoz is a 42 y.o. female with right hip femoral acetabular impingement and known sesamoiditis of the right foot.  Encounter Diagnoses   Name Primary?    Femoroacetabular impingement of right hip Yes    Right hip pain     Pain in unspecified hip     Sesamoiditis of right foot           Plan:         The diagnosis and treatment options were discussed length with the patient all her questions were answered.  I showed her the x-rays and explained the findings to her.  We discussed treatment options for femoral acetabular impingement.  I do think she has a concomitant labral tear and this is contributing to her symptoms.  We will obtain an MRI with the 3 T magnet to avoid an arthrogram.  This will allow was to evaluate the acetabular labrum as well as the potential bony morphology and cam impingement.  In addition, her sesamoiditis seems to be worsening from when she saw Dr. Lopez.  This makes it difficult to perform her job when she has to move quickly.  She states an MRI was discussed with him if he had failure to improve.  She would like to obtain the MRI we will order this for him.  Return to clinic after the MRI of the hip to review the results and discuss treatment options.    All of their questions were answered.  They will call the clinic with any questions or concerns in the interim.    Should the patient's symptoms worsen, persist, or fail to improve they should return for reevaluation and I  would be happy to see them back anytime.        Fredrick Renee M.D.    Please be aware that this note has been generated with the assistance of [employer] voice-to-text.  Please excuse any spelling or grammatical errors.    Thank you for choosing Dr. Fredrick Renee for your sports medicine care. It is our goal to provide you with exceptional care that will help keep you healthy, active, and get you back in the game.     If you felt that you received exemplary care today, please consider leaving feedback for Dr. Renee on [employer] at [email or web address].    Please do not hesitate to reach out to us via email, phone, or MyChart with any questions, concerns, or feedback.

## 2022-10-02 ENCOUNTER — PATIENT MESSAGE (OUTPATIENT)
Dept: SPORTS MEDICINE | Facility: CLINIC | Age: 42
End: 2022-10-02
Payer: COMMERCIAL

## 2022-10-19 ENCOUNTER — HOSPITAL ENCOUNTER (OUTPATIENT)
Dept: RADIOLOGY | Facility: HOSPITAL | Age: 42
Discharge: HOME | End: 2022-10-19
Attending: STUDENT IN AN ORGANIZED HEALTH CARE EDUCATION/TRAINING PROGRAM
Payer: COMMERCIAL

## 2022-10-19 DIAGNOSIS — M25.851 FEMOROACETABULAR IMPINGEMENT OF RIGHT HIP: ICD-10-CM

## 2022-10-19 DIAGNOSIS — M25.871 SESAMOIDITIS OF RIGHT FOOT: ICD-10-CM

## 2022-10-19 PROCEDURE — 73718 MRI LOWER EXTREMITY W/O DYE: CPT | Mod: 26,RT,, | Performed by: RADIOLOGY

## 2022-10-19 PROCEDURE — 73721 MRI JNT OF LWR EXTRE W/O DYE: CPT | Mod: TC,RT

## 2022-10-19 PROCEDURE — 73718 MRI FOOT TOES WITHOUT CONTRAST RIGHT: ICD-10-PCS | Mod: 26,RT,, | Performed by: RADIOLOGY

## 2022-10-19 PROCEDURE — 73721 MRI JNT OF LWR EXTRE W/O DYE: CPT | Mod: 26,RT,, | Performed by: RADIOLOGY

## 2022-10-19 PROCEDURE — 73718 MRI LOWER EXTREMITY W/O DYE: CPT | Mod: TC,RT

## 2022-10-19 PROCEDURE — 73721 MRI HIP WITHOUT CONTRAST RIGHT: ICD-10-PCS | Mod: 26,RT,, | Performed by: RADIOLOGY

## 2022-10-20 ENCOUNTER — PATIENT MESSAGE (OUTPATIENT)
Dept: SPORTS MEDICINE | Facility: CLINIC | Age: 42
End: 2022-10-20
Payer: COMMERCIAL

## 2022-10-20 ENCOUNTER — PATIENT MESSAGE (OUTPATIENT)
Dept: PRIMARY CARE CLINIC | Facility: CLINIC | Age: 42
End: 2022-10-20
Payer: COMMERCIAL

## 2022-10-21 ENCOUNTER — LAB VISIT (OUTPATIENT)
Dept: LAB | Facility: OTHER | Age: 42
End: 2022-10-21
Attending: INTERNAL MEDICINE
Payer: COMMERCIAL

## 2022-10-21 DIAGNOSIS — Z00.00 NORMAL PHYSICAL EXAM, ROUTINE: ICD-10-CM

## 2022-10-21 DIAGNOSIS — Z13.220 SCREENING FOR LIPOID DISORDERS: ICD-10-CM

## 2022-10-21 LAB
ALBUMIN SERPL BCP-MCNC: 3.9 G/DL (ref 3.5–5.2)
ALP SERPL-CCNC: 67 U/L (ref 55–135)
ALT SERPL W/O P-5'-P-CCNC: 25 U/L (ref 10–44)
ANION GAP SERPL CALC-SCNC: 5 MMOL/L (ref 8–16)
AST SERPL-CCNC: 21 U/L (ref 10–40)
BASOPHILS # BLD AUTO: 0.05 K/UL (ref 0–0.2)
BASOPHILS NFR BLD: 0.9 % (ref 0–1.9)
BILIRUB SERPL-MCNC: 0.5 MG/DL (ref 0.1–1)
BUN SERPL-MCNC: 10 MG/DL (ref 6–20)
CALCIUM SERPL-MCNC: 8.8 MG/DL (ref 8.7–10.5)
CHLORIDE SERPL-SCNC: 105 MMOL/L (ref 95–110)
CHOLEST SERPL-MCNC: 163 MG/DL (ref 120–199)
CHOLEST/HDLC SERPL: 2.8 {RATIO} (ref 2–5)
CO2 SERPL-SCNC: 27 MMOL/L (ref 23–29)
CREAT SERPL-MCNC: 0.8 MG/DL (ref 0.5–1.4)
DIFFERENTIAL METHOD: ABNORMAL
EOSINOPHIL # BLD AUTO: 0.1 K/UL (ref 0–0.5)
EOSINOPHIL NFR BLD: 1.6 % (ref 0–8)
ERYTHROCYTE [DISTWIDTH] IN BLOOD BY AUTOMATED COUNT: 11.9 % (ref 11.5–14.5)
EST. GFR  (NO RACE VARIABLE): >60 ML/MIN/1.73 M^2
GLUCOSE SERPL-MCNC: 90 MG/DL (ref 70–110)
HCT VFR BLD AUTO: 37.4 % (ref 37–48.5)
HDLC SERPL-MCNC: 59 MG/DL (ref 40–75)
HDLC SERPL: 36.2 % (ref 20–50)
HGB BLD-MCNC: 12.8 G/DL (ref 12–16)
IMM GRANULOCYTES # BLD AUTO: 0.03 K/UL (ref 0–0.04)
IMM GRANULOCYTES NFR BLD AUTO: 0.5 % (ref 0–0.5)
LDLC SERPL CALC-MCNC: 90.8 MG/DL (ref 63–159)
LYMPHOCYTES # BLD AUTO: 2.2 K/UL (ref 1–4.8)
LYMPHOCYTES NFR BLD: 38.7 % (ref 18–48)
MCH RBC QN AUTO: 30.2 PG (ref 27–31)
MCHC RBC AUTO-ENTMCNC: 34.2 G/DL (ref 32–36)
MCV RBC AUTO: 88 FL (ref 82–98)
MONOCYTES # BLD AUTO: 0.4 K/UL (ref 0.3–1)
MONOCYTES NFR BLD: 6.1 % (ref 4–15)
NEUTROPHILS # BLD AUTO: 3 K/UL (ref 1.8–7.7)
NEUTROPHILS NFR BLD: 52.2 % (ref 38–73)
NONHDLC SERPL-MCNC: 104 MG/DL
NRBC BLD-RTO: 0 /100 WBC
PLATELET # BLD AUTO: 303 K/UL (ref 150–450)
PMV BLD AUTO: 9.1 FL (ref 9.2–12.9)
POTASSIUM SERPL-SCNC: 3.8 MMOL/L (ref 3.5–5.1)
PROT SERPL-MCNC: 6.9 G/DL (ref 6–8.4)
RBC # BLD AUTO: 4.24 M/UL (ref 4–5.4)
SODIUM SERPL-SCNC: 137 MMOL/L (ref 136–145)
T4 FREE SERPL-MCNC: 0.96 NG/DL (ref 0.71–1.51)
TRIGL SERPL-MCNC: 66 MG/DL (ref 30–150)
TSH SERPL DL<=0.005 MIU/L-ACNC: 1.26 UIU/ML (ref 0.4–4)
WBC # BLD AUTO: 5.73 K/UL (ref 3.9–12.7)

## 2022-10-21 PROCEDURE — 84443 ASSAY THYROID STIM HORMONE: CPT | Performed by: INTERNAL MEDICINE

## 2022-10-21 PROCEDURE — 80053 COMPREHEN METABOLIC PANEL: CPT | Performed by: INTERNAL MEDICINE

## 2022-10-21 PROCEDURE — 36415 COLL VENOUS BLD VENIPUNCTURE: CPT | Performed by: INTERNAL MEDICINE

## 2022-10-21 PROCEDURE — 80061 LIPID PANEL: CPT | Performed by: INTERNAL MEDICINE

## 2022-10-21 PROCEDURE — 85025 COMPLETE CBC W/AUTO DIFF WBC: CPT | Performed by: INTERNAL MEDICINE

## 2022-10-21 PROCEDURE — 84439 ASSAY OF FREE THYROXINE: CPT | Performed by: INTERNAL MEDICINE

## 2022-10-26 NOTE — PROGRESS NOTES
I sent pt a my chart message -  I reviewed your labs. Your thyroid functions are normal.  Your Cholesterol looked good.  Your kidney function and liver functions looked good.  You are not anemic. No further recommendations at this time. I also wanted to say Thanks for taking such good care of my sister Mihaela.  She told me you were her hospitalist for the delivery of Turner.  We weren't able to be there since my Mom was in  and since Ketty Oshea was out of town I was so glad to hear she was in good hands    Ofelia

## 2022-10-27 DIAGNOSIS — S73.191A TEAR OF RIGHT ACETABULAR LABRUM, INITIAL ENCOUNTER: Primary | ICD-10-CM

## 2022-10-27 DIAGNOSIS — M25.851 FEMOROACETABULAR IMPINGEMENT OF RIGHT HIP: ICD-10-CM

## 2022-11-02 ENCOUNTER — PATIENT MESSAGE (OUTPATIENT)
Dept: OBSTETRICS AND GYNECOLOGY | Facility: CLINIC | Age: 42
End: 2022-11-02
Payer: COMMERCIAL

## 2022-11-03 RX ORDER — TIZANIDINE 2 MG/1
2-4 TABLET ORAL NIGHTLY
Qty: 60 TABLET | Refills: 3 | Status: SHIPPED | OUTPATIENT
Start: 2022-11-03 | End: 2023-03-19 | Stop reason: SDUPTHER

## 2022-11-03 RX ORDER — TRANEXAMIC ACID 650 MG/1
1300 TABLET ORAL 3 TIMES DAILY
Qty: 30 TABLET | Refills: 0 | Status: SHIPPED | OUTPATIENT
Start: 2022-11-03 | End: 2022-11-09 | Stop reason: SDUPTHER

## 2022-11-09 ENCOUNTER — OFFICE VISIT (OUTPATIENT)
Dept: OBSTETRICS AND GYNECOLOGY | Facility: CLINIC | Age: 42
End: 2022-11-09
Payer: COMMERCIAL

## 2022-11-09 VITALS
BODY MASS INDEX: 22.28 KG/M2 | WEIGHT: 150.44 LBS | HEIGHT: 69 IN | DIASTOLIC BLOOD PRESSURE: 80 MMHG | SYSTOLIC BLOOD PRESSURE: 110 MMHG

## 2022-11-09 DIAGNOSIS — Z12.31 ENCOUNTER FOR SCREENING MAMMOGRAM FOR BREAST CANCER: ICD-10-CM

## 2022-11-09 DIAGNOSIS — N92.0 MENORRHAGIA WITH REGULAR CYCLE: ICD-10-CM

## 2022-11-09 DIAGNOSIS — Z11.3 SCREENING FOR STDS (SEXUALLY TRANSMITTED DISEASES): ICD-10-CM

## 2022-11-09 DIAGNOSIS — Z01.419 ENCOUNTER FOR ANNUAL ROUTINE GYNECOLOGICAL EXAMINATION: Primary | ICD-10-CM

## 2022-11-09 PROCEDURE — 99999 PR PBB SHADOW E&M-EST. PATIENT-LVL III: ICD-10-PCS | Mod: PBBFAC,,, | Performed by: OBSTETRICS & GYNECOLOGY

## 2022-11-09 PROCEDURE — 99999 PR PBB SHADOW E&M-EST. PATIENT-LVL III: CPT | Mod: PBBFAC,,, | Performed by: OBSTETRICS & GYNECOLOGY

## 2022-11-09 PROCEDURE — 3008F BODY MASS INDEX DOCD: CPT | Mod: CPTII,S$GLB,, | Performed by: OBSTETRICS & GYNECOLOGY

## 2022-11-09 PROCEDURE — 99396 PR PREVENTIVE VISIT,EST,40-64: ICD-10-PCS | Mod: S$GLB,,, | Performed by: OBSTETRICS & GYNECOLOGY

## 2022-11-09 PROCEDURE — 1160F PR REVIEW ALL MEDS BY PRESCRIBER/CLIN PHARMACIST DOCUMENTED: ICD-10-PCS | Mod: CPTII,S$GLB,, | Performed by: OBSTETRICS & GYNECOLOGY

## 2022-11-09 PROCEDURE — 3079F PR MOST RECENT DIASTOLIC BLOOD PRESSURE 80-89 MM HG: ICD-10-PCS | Mod: CPTII,S$GLB,, | Performed by: OBSTETRICS & GYNECOLOGY

## 2022-11-09 PROCEDURE — 3074F PR MOST RECENT SYSTOLIC BLOOD PRESSURE < 130 MM HG: ICD-10-PCS | Mod: CPTII,S$GLB,, | Performed by: OBSTETRICS & GYNECOLOGY

## 2022-11-09 PROCEDURE — 99396 PREV VISIT EST AGE 40-64: CPT | Mod: S$GLB,,, | Performed by: OBSTETRICS & GYNECOLOGY

## 2022-11-09 PROCEDURE — 3008F PR BODY MASS INDEX (BMI) DOCUMENTED: ICD-10-PCS | Mod: CPTII,S$GLB,, | Performed by: OBSTETRICS & GYNECOLOGY

## 2022-11-09 PROCEDURE — 1159F MED LIST DOCD IN RCRD: CPT | Mod: CPTII,S$GLB,, | Performed by: OBSTETRICS & GYNECOLOGY

## 2022-11-09 PROCEDURE — 1159F PR MEDICATION LIST DOCUMENTED IN MEDICAL RECORD: ICD-10-PCS | Mod: CPTII,S$GLB,, | Performed by: OBSTETRICS & GYNECOLOGY

## 2022-11-09 PROCEDURE — 3079F DIAST BP 80-89 MM HG: CPT | Mod: CPTII,S$GLB,, | Performed by: OBSTETRICS & GYNECOLOGY

## 2022-11-09 PROCEDURE — 1160F RVW MEDS BY RX/DR IN RCRD: CPT | Mod: CPTII,S$GLB,, | Performed by: OBSTETRICS & GYNECOLOGY

## 2022-11-09 PROCEDURE — 3074F SYST BP LT 130 MM HG: CPT | Mod: CPTII,S$GLB,, | Performed by: OBSTETRICS & GYNECOLOGY

## 2022-11-09 RX ORDER — TRANEXAMIC ACID 650 MG/1
1300 TABLET ORAL 3 TIMES DAILY
Qty: 90 TABLET | Refills: 3 | Status: SHIPPED | OUTPATIENT
Start: 2022-11-09 | End: 2023-01-12

## 2022-11-09 NOTE — PROGRESS NOTES
Chief Complaint: Well Woman Exam     HPI:      Wendy Munoz is a 42 y.o.  who presents today for well woman exam.  LMP: Patient's last menstrual period was 2022 (exact date).  No issues, problems, or complaints. Specifically, patient denies abnormal vaginal bleeding, discharge, pelvic pain, urinary problems, or changes in appetite. Ms. Munoz is currently sexually active with a single male partner. She would like STD screening today. Patient has regular monthly menses with Lysteda use for heavy cycles. Patient's last menstrual period was 2022 (exact date). She is currently using vasectomy for contraception. Menopausal complaints: none    Previous Pap: no abnormalities/HPV negative  (2020)  Previous Mammogram: BiRads: 1 T-C Score: 23% Results for orders placed during the hospital encounter of 22    Mammo Digital Screening Bilat w/ Chidi    Narrative  Result:  Mammo Digital Screening Bilat w/ Chidi    History:  Patient is 42 y.o. and is seen for a screening mammogram.      Films Compared:  Prior images (if available) were compared.    Findings:  This procedure was performed using tomosynthesis.  Computer-aided detection was utilized in the interpretation of this examination.    The breasts are heterogeneously dense, which may obscure small masses. There is no evidence of suspicious masses, microcalcifications or architectural distortion.    Impression  No mammographic evidence of malignancy.    BI-RADS Category 1: Negative    Recommendation:  Routine screening mammogram in 1 year is recommended.    Your estimated lifetime risk of breast cancer (to age 85) based on Tyrer-Cuzick risk assessment model is 23.4 %.  According to the American Cancer Society, patients with a lifetime breast cancer risk of 20% or higher might benefit from supplemental screening tests. ??    MRI done 2022 and normal      Gardasil: Completed     OB History          4    Para   3    Term   3      "       AB   1    Living   3         SAB   1    IAB        Ectopic        Multiple        Live Births   3           Obstetric Comments   Menarche at 13  H/O abnormal pap (2004)- colpo normal  No other STDs                   ROS:     GENERAL: Denies unintentional weight gain or weight loss. Feeling well overall.   SKIN: Denies rash or lesions.   HEENT: Denies headaches, or vision changes.   CARDIOVASCULAR: Denies palpitations or chest pain.   RESPIRATORY: Denies shortness of breath or dyspnea on exertion.  BREASTS: Denies pain, lumps, or nipple discharge.   ABDOMEN: Denies abdominal pain, constipation, diarrhea, nausea, vomiting, change in appetite.  URINARY: Denies frequency, dysuria, hematuria.  NEUROLOGIC: Denies syncope or weakness.   PSYCHIATRIC: Denies depression, anxiety or mood swings.    Physical Exam:      PHYSICAL EXAM:  /80   Ht 5' 9" (1.753 m)   Wt 68.2 kg (150 lb 7.4 oz)   LMP 11/01/2022 (Exact Date)   BMI 22.22 kg/m²   Body mass index is 22.22 kg/m².     APPEARANCE: Well nourished, well developed, in no acute distress.  PSYCH: Appropriate mood and affect.  SKIN: No acne or hirsutism  NECK: Neck symmetric without masses or thyromegaly  NODES: No inguinal, axillary, or supraclavicular lymph node enlargement  ABDOMEN: Soft.  No tenderness or masses.    CARDIOVASCULAR: No edema of peripheral extremities  BREASTS: Symmetrical, no skin changes or visible lesions.  No palpable masses or nipple discharge bilaterally.  PELVIC: Normal external genitalia without lesions.  Normal hair distribution.  Adequate perineal body, normal urethral meatus.  Vagina moist and well rugated without lesions or discharge.  Cervix pink, without lesions, discharge or tenderness.  No significant cystocele or rectocele.  Bimanual exam shows uterus to be normal size, regular, mobile and nontender.  Adnexa without masses or tenderness.      Assessment/Plan:     Encounter for annual routine gynecological examination  Normal " exam today. Pap smear up to date and normal. STD screen desired due to needle stick risk at work. Gardasil completed. Plan mammogram 1/2023 then MRI 7/2023 due to elevated T-C score. Lysteda refilled for year for management of heavy cycles. Other health maintenance up to date per PCP.     Encounter for screening mammogram for breast cancer  -     Mammo Digital Screening Bilat w/ Chidi; Future; Expected date: 11/09/2022    Screening for STDs (sexually transmitted diseases)  -     Hepatitis B Surface Antigen; Future; Expected date: 11/09/2022  -     Hepatitis C Antibody; Future; Expected date: 02/09/2023  -     HIV 1/2 Ag/Ab (4th Gen); Future; Expected date: 11/09/2022  -     RPR; Future; Expected date: 11/09/2022    Menorrhagia with regular cycle  -     tranexamic acid (LYSTEDA) 650 mg tablet; Take 2 tablets (1,300 mg total) by mouth 3 (three) times daily. for 5 days  Dispense: 90 tablet; Refill: 3    RTC 1 year    Counseling:     Patient was counseled today on current ASCCP pap guidelines, the recommendation for yearly pelvic exams, healthy diet and exercise routines, breast self awareness.She is to see her PCP for other health maintenance.     Use of the [facility] Patient Portal discussed and encouraged during today's visit.       Parul Oshea MD      As of April 1, 2021, the Cures Act has been passed nationally. This new law requires that all doctors progress notes, lab results, pathology reports and radiology reports be released IMMEDIATELY to the patient in the patient portal. That means that the results are released to you at the EXACT same time they are released to me. Therefore, with all of the patients that I have I am not able to reply to each patient exactly when the results come in. So there will be a delay from when you see the results to when I see them and have time to come up with a response to send you. Also I only see these results when I am on the computer at work. So if the results come in over the  weekend or after 5 pm of a work day, I will not see them until the next business day. As you can tell, this is a challenge as a physician to give every patient the quick response they hope for and deserve. So please be patient! Thanks for understanding, Dr. Oshea

## 2022-11-30 ENCOUNTER — CLINICAL SUPPORT (OUTPATIENT)
Dept: REHABILITATION | Facility: HOSPITAL | Age: 42
End: 2022-11-30
Attending: STUDENT IN AN ORGANIZED HEALTH CARE EDUCATION/TRAINING PROGRAM
Payer: COMMERCIAL

## 2022-11-30 DIAGNOSIS — R29.898 WEAKNESS OF BOTH HIPS: ICD-10-CM

## 2022-11-30 DIAGNOSIS — M25.551 ACUTE RIGHT HIP PAIN: ICD-10-CM

## 2022-11-30 DIAGNOSIS — M62.81 WEAKNESS OF TRUNK MUSCULATURE: ICD-10-CM

## 2022-11-30 DIAGNOSIS — M25.851 FEMOROACETABULAR IMPINGEMENT OF RIGHT HIP: ICD-10-CM

## 2022-11-30 DIAGNOSIS — S73.191A TEAR OF RIGHT ACETABULAR LABRUM, INITIAL ENCOUNTER: ICD-10-CM

## 2022-11-30 PROCEDURE — 97161 PT EVAL LOW COMPLEX 20 MIN: CPT | Mod: PO

## 2022-11-30 PROCEDURE — 97110 THERAPEUTIC EXERCISES: CPT | Mod: PO

## 2022-11-30 PROCEDURE — 97140 MANUAL THERAPY 1/> REGIONS: CPT | Mod: PO

## 2022-12-01 NOTE — PLAN OF CARE
[facility]  Physical Therapy Initial Evaluation    Date: 11/30/2022   Name: Wendy Munoz  Clinic Number: [id number]    Therapy Diagnosis:   Encounter Diagnoses   Name Primary?    Femoroacetabular impingement of right hip     Tear of right acetabular labrum, initial encounter     Acute right hip pain     Weakness of both hips     Weakness of trunk musculature      Physician: Fredrick Renee MD    Physician Orders: PT eval and treat  Medical Diagnosis: Femoroacetabular impingement of right hip [M25.851]   Tear of right acetabular labrum, initial encounter [S73.191A]  Evaluation Date: 11/30/22  Authorization Period Expiration: 2/28/23  Plan of Care Certification Period: 2/28/23  Progress Note Due: 12/30/22    Visit # / Visits authorized: 1/ 1   FOTO: 1/ 3   PTA Visit #: 0/5     Time In: 849 am  Time Out: 932 am  Total Appointment Time (timed & untimed codes): 43 minutes, low complex eval. MT 1, TE 1    Precautions: Standard    Subjective   Date of onset: +4 months ago after short run in new shoes after no running for a month.    History of current condition - Wendy reports: per ortho visit on on 9/29/22:  Chief Complaint: Wendy Munoz is a 42 y.o. female who had concerns including Pain of the Right Hip.     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  She is referred by Dr. Bailey.  She states she has had pain for some time.  About 1 year ago she had an ankle sprain which led to sesamoiditis in the right great toe.  This is being treated by Dr. Lopez.  She did obtain new shoes, Hokas.  When she went for a 1 mi run in these afterwards she began experiencing sharp pain in the anterior aspect of the right hip/groin.  This has persisted since.  The pain increases with hip flexion.  This includes getting in out of a car.  Trying to sit in an  style position playing with her kids.  The pain also increases with quick  twisting movements.  She denies any true locking in her hip but does states she has some sensations of clicking.  Denies any numbness or radicular symptoms.  Denies any back pain     Medical History:   Past Medical History:   Diagnosis Date    Acute right hip pain 11/30/2022    Asthma 2001    Exercise induced    Basal cell carcinoma     Dysmenorrhea 2016    History of COVID-19 08/2021    Metatarsalgia of right foot     Migraine     PUD (peptic ulcer disease)     Sesamoiditis of right foot        Surgical History:   Wendy Munoz  has a past surgical history that includes Hysteroscopy w/ polypectomy; Hernia repair; Removal of intrauterine device (IUD); Abdominal surgery (9/3015); Hysteroscopy (11/2011); Pelvic laparoscopy (12/2016); and Skin biopsy.    Medications:   Wendy has a current medication list which includes the following prescription(s): albuterol, fish oil-omega-3 fatty acids, magnesium, multivitamin, rizatriptan, and tizanidine, and the following Facility-Administered Medications: albuterol.    Allergies:   Review of patient's allergies indicates:   Allergen Reactions    Latex, natural rubber      Rash/itching        Imaging, MRI studies:   EXAMINATION:  MRI HIP WITHOUT CONTRAST RIGHT     CLINICAL HISTORY:  Hip pain, chronic, labral tear suspected, xray done;evaluate labrum;  Other specified joint disorders, right hip     COMPARISON:  X-ray 9292     FINDINGS:  Bone and Joints:Bone marrow signal and morphology normal.  Specifically, normal morphology of the femoral head neck junction with alpha angle of 40 degrees.     Articular cartilage and labrum:There is tearing of the anterior superior acetabular labrum with associated chondrolabral separation suspected there is adjacent articular cartilage blistering of the anterior superior aspect of the acetabulum.Articular cartilage otherwise preserved.  Labrum otherwise intact.  No evidence of a paralabral cyst.  There is some thickening and heterogeneous  signal of the ligamentum teres suggesting intrasubstance degeneration.  No focal disruption.     Soft-tissues:Feathery T2 signal abnormality of the gluteus minimus muscle suggests grade 1 strain.  There is some thickening and a small partial tear of the insertional fibers of the gluteus medius at the insertion onto the lateral facet of the femur.  Similar findings are seen on the contralateral side, also noting soft tissue edema/T2 hyperintensity in the region of the trochanteric bursa bilaterally.     Miscellaneous:Visualized pelvic viscera within normal limits for age.     Impression:     Anterior superior acetabular labral tear with associated chondrolabral separation.     Intrasubstance degeneration of the ligamentum teres     Grade 1 muscle strain of the gluteus minimus bilaterally     Partial-thickness insertional tears and overlying trochanteric bursal thickening bilaterally.        Electronically signed by: Julian Allen Jr  Date:                                            10/20/2022  Time:                                           09:05           Exam Ended: 10/19/22 19:55 Last Resulted: 10/20/22              Prior Therapy: yes for cervical   Social History:  lives with their family  Occupation: OB Gyn MD  Prior Level of Function: independent community level with recreational exercise  Current Level of Function: same but limited by pain    Pain:  Current 2/10, worst 8/10, best 0/10   Location: right back , buttocks , and groin   Description: Tight and Sharp  Aggravating Factors: sudden change in position and loading with pivot ie exiting a car  Easing Factors: relaxation, pain medication, and rest    Pts goals: return to running without pain    Objective     Lumbar ROM: (measured in degrees)    Degrees Quality   flexion   +100    extension   20    Rotation R WNL Pain on R   Rotation L WFL  Pain on R     Active/Passive Hip ROM: (measured in degrees) R with pain into flexion and attempts with combined  motion.      RLE LLE   Flexion 90/ 100/   Extension 15/ 20/     Lower Extremity Strength (graded 0-5 out of 5)   RLE LLE   Hip flexion: 4-/5 4+/5   Hip extension: 4-/5 4/5             Knee flexion 5/5 5/5   Knee extension 5/5 5/5   Hip adduction 4+/5 5/5   Hip abduction 4/5 4+/5     Special Tests: ((+): pos.; (-): neg.) deferred due to R hip pain at this time and guarding.  Fabers Test: NT  Slump Test: neg  SLR Test: NT  Palpation: iliacus, psoas, rectus femoris, quad tendon, QL, glut medius in order of worst to least on R  Gait: mild decreased loading phase and hip extension on R with decreased alisha    CMS Impairment/Limitation/Restriction for FOTO hip Survey    Therapist reviewed FOTO scores for Wendy Munoz on 2022.   FOTO documents entered into [employer] - see Media section.    Limitation Score: 31%  Category: Other             TREATMENT   Treatment Time In: 907  Treatment Time Out: 932  Total Treatment time (time-based codes) separate from Evaluation: 25 minutes    Wendy received therapeutic exercises to develop strength, endurance, posture, and core stabilization for 8 minutes includin set to educate with HEP.    PPT x 30  Small amplitude trunk rotation x 30    Bridges with add ball 3 x 10  Bridges with abd GTB 3 x 10  Supine clams GTB 3 x 10      Hip add/frog stretch 3 x 30 secs  Trunk rotation stretch 3 x 30 secs  SKTC 3 x 30 secs NP  Jose De Jesus test position hip flexor stretch 3 x 30 secs     Wendy received the following manual therapy techniques: Manual traction, Myofacial release, and Soft tissue Mobilization were applied to the: iliacus, psoas, QL. Rectus femoris for 17 minutes, including:    Short axis traction minimal        Wendy received hot pack for 0 minutes to hip lumbar.      Home Exercises and Patient Education Provided    Education provided:   - HEP, pain management    Written Home Exercises Provided: Patient instructed to cont prior HEP.  Exercises were reviewed and Wendy was  able to demonstrate them prior to the end of the session.  Wendy demonstrated good  understanding of the education provided.     See EMR under Patient Instructions for exercises provided 11/30/2022.    Assessment   Wendy is a 42 y.o. female referred to outpatient Physical Therapy with a medical diagnosis of Femoroacetabular impingement of right hip [M25.851]   Tear of right acetabular labrum, initial encounter [S73.239A]. Pt presents with R hip guarding and apprehension into flexion and coming out of flexion. Pt with no tolerance for combined motion and sensitive with palpation and limited assessment of hip joint beyond palpation. Pt with mild patella alexandra and tracking laterally overall. Pt with decreased overall hip extension and abduction with decreased overall hip stability and lumbar. 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 Pt with acute injury with new shoes and recent issues at R great toe leading to compensation and coordination and timing with loading phase leading to impingement syndrome at R hip and previous instability and guarding due to labral tear.     Pt prognosis is Good.   Pt will benefit from skilled outpatient Physical Therapy to address the deficits stated above and in the chart below, provide pt/family education, and to maximize pt's level of independence.     Plan of care discussed with patient: Yes  Pt's spiritual, cultural and educational needs considered and patient is agreeable to the plan of care and goals as stated below:     Anticipated Barriers for therapy: none    Medical Necessity is demonstrated by the following  History  Co-morbidities and personal factors that may impact the plan of care Co-morbidities:   See PMHx    Personal Factors:   no deficits     low   Examination  Body Structures and Functions, activity limitations and participation restrictions that may impact the plan of care Body  Regions:   back  lower extremities    Body Systems:    gross symmetry  ROM  strength  gross coordinated movement  gait    Participation Restrictions:   none    Activity limitations:   Learning and applying knowledge  no deficits    General Tasks and Commands  no deficits    Communication  no deficits    Mobility  lifting and carrying objects  walking    Self care  no deficits    Domestic Life  no deficits    Interactions/Relationships  no deficits    Life Areas  employment    Community and Social Life  community life  recreation and leisure         low     Clinical Presentation stable and uncomplicated low   Decision Making/ Complexity Score: low     Goals:STG 4 weeks  1.  Pt to be independent with HEP for increased functional mobility and pain control.  2.  Pt to increase AROM at 30 degs B hip extension for increased functional mobility and transfers.  3.  Pt to decrease pain to less than 0/10 after session for increased functional mobility.    Long Term Goals: 12 weeks   1.  Pt to be independent with pain management strategies and verbalize 2 strategies for increased functional mobility and pain control.  2.  Pt to increase B hip extension and abduction to 4+/5 to 5/5 for increased functional mobility and transfers.  3.  Pt to decrease pain to less than 0/10 after 1 mile run for increased functional mobility.  4.  Pt to increase overall tolerance to 45 min of recreational exercise without increased pain overall for return to recreation exercise.     Plan   Plan of care Certification: 11/30/2022 to 2/28/23.    Outpatient Physical Therapy 1 times weekly for 12 weeks to include the following interventions: Cervical/Lumbar Traction, Manual Therapy, Moist Heat/ Ice, Neuromuscular Re-ed, Patient Education, and Therapeutic Exercise.     Idalia Olvera, PT

## 2022-12-07 ENCOUNTER — CLINICAL SUPPORT (OUTPATIENT)
Dept: REHABILITATION | Facility: HOSPITAL | Age: 42
End: 2022-12-07
Attending: INTERNAL MEDICINE
Payer: COMMERCIAL

## 2022-12-07 DIAGNOSIS — R29.898 WEAKNESS OF BOTH HIPS: ICD-10-CM

## 2022-12-07 DIAGNOSIS — M62.81 WEAKNESS OF TRUNK MUSCULATURE: ICD-10-CM

## 2022-12-07 DIAGNOSIS — M25.551 ACUTE RIGHT HIP PAIN: Primary | ICD-10-CM

## 2022-12-07 PROCEDURE — 97140 MANUAL THERAPY 1/> REGIONS: CPT | Mod: PO

## 2022-12-07 PROCEDURE — 97110 THERAPEUTIC EXERCISES: CPT | Mod: PO

## 2022-12-07 NOTE — PROGRESS NOTES
[facility]   Physical Therapy Treatment Note     Name: Wendy Munoz  Clinic Number: [id number]    Therapy Diagnosis:   Encounter Diagnoses   Name Primary?    Acute right hip pain Yes    Weakness of both hips     Weakness of trunk musculature      Physician: Fredrick Renee MD    Visit Date: 2022    Physician Orders: PT eval and treat  Medical Diagnosis: Femoroacetabular impingement of right hip [M25.851]   Tear of right acetabular labrum, initial encounter [S73.191A]  Evaluation Date: 22  Authorization Period Expiration: 23  Plan of Care Certification Period: 23  Progress Note Due: 22    Visit # / Visits authorized: 1/ 10  FOTO: 1/ 3   PTA Visit #:      Precautions: Standard    Time In: 848 am  Time Out: 928 am  Total Billable Time: 40 minutes TE 2, MT 1      SUBJECTIVE     Pt reports: sore for 1-2 days but much better already and strained during transfers of patient sliding them with rotation and flexion and side flexion..  She was compliant with home exercise program.  Response to previous treatment: good pain relief  Functional change: improved walking and tolerance during work day.    Pain: 2/10  Location: right groin  and hip      OBJECTIVE     Objective Measures updated at progress report unless specified.       TREATMENT     Total Treatment time (time-based codes) separate from Evaluation: 40 minutes     Wendy received the treatments listed below:        Wendy received therapeutic exercises to develop strength, endurance, posture, and core stabilization for 23 minutes includin set to educate with HEP.     PPT x 30  Small amplitude trunk rotation x 30     Bridges with add ball 3 x 10  Bridges with abd GTB 3 x 10  Supine clams GTB 3 x 10 in up position  Side step with GTB above knee 3 x 20'         Hip add/frog stretch 3 x 30 secs  Trunk rotation stretch 3 x 30 secs  SKTC 3 x 30 secs NP  Jose De Jesus test position hip flexor stretch 3 x 30 secs       Wendy received the following manual therapy techniques: Manual traction, Myofacial release, and Soft tissue Mobilization were applied to the: iliacus, psoas, QL. Rectus femoris for 17 minutes, including:     Short axis traction  Long axis traction  Sidelying long axis neutral and with mild rotation           Wendy received hot pack for 0 minutes to hip lumbar.       PATIENT EDUCATION AND HOME EXERCISES     Home Exercises Provided and Patient Education Provided     Education provided:   - HEP    Written Home Exercises Provided: Patient instructed to cont prior HEP. Exercises were reviewed and Wendy was able to demonstrate them prior to the end of the session.  Wendy demonstrated good  understanding of the education provided. See EMR under Patient Instructions for exercises provided during therapy sessions    ASSESSMENT     Pt with good relief today and no issues with progression of workload. Pt increased tolerance with adductor stretch and marked decreased psoas and iliacus guarding with more normal gait.       Wendy Is progressing well towards her goals.   Pt prognosis is Good.     Pt will continue to benefit from skilled outpatient physical therapy to address the deficits listed in the problem list box on initial evaluation, provide pt/family education and to maximize pt's level of independence in the home and community environment.     Pt's spiritual, cultural and educational needs considered and pt agreeable to plan of care and goals.     Anticipated barriers to physical therapy: none    Goals:   STG 4 weeks  1.  Pt to be independent with HEP for increased functional mobility and pain control. Progressing 12/7/22  2.  Pt to increase AROM at 30 degs B hip extension for increased functional mobility and transfers.  3.  Pt to decrease pain to less than 0/10 after session for increased functional mobility. Progressing 12/7/22     Long Term Goals: 12 weeks   1.  Pt to be independent with pain management  strategies and verbalize 2 strategies for increased functional mobility and pain control.  2.  Pt to increase B hip extension and abduction to 4+/5 to 5/5 for increased functional mobility and transfers.  3.  Pt to decrease pain to less than 0/10 after 1 mile run for increased functional mobility.  4.  Pt to increase overall tolerance to 45 min of recreational exercise without increased pain overall for return to recreation exercise.     PLAN     Cont with POC    Idalia Olvera, PT

## 2022-12-14 ENCOUNTER — CLINICAL SUPPORT (OUTPATIENT)
Dept: REHABILITATION | Facility: HOSPITAL | Age: 42
End: 2022-12-14
Attending: STUDENT IN AN ORGANIZED HEALTH CARE EDUCATION/TRAINING PROGRAM
Payer: COMMERCIAL

## 2022-12-14 DIAGNOSIS — M25.551 ACUTE RIGHT HIP PAIN: Primary | ICD-10-CM

## 2022-12-14 DIAGNOSIS — R29.898 WEAKNESS OF BOTH HIPS: ICD-10-CM

## 2022-12-14 DIAGNOSIS — M62.81 WEAKNESS OF TRUNK MUSCULATURE: ICD-10-CM

## 2022-12-14 PROCEDURE — 97140 MANUAL THERAPY 1/> REGIONS: CPT | Mod: PO

## 2022-12-14 PROCEDURE — 97110 THERAPEUTIC EXERCISES: CPT | Mod: PO

## 2022-12-14 NOTE — PROGRESS NOTES
[facility]   Physical Therapy Treatment Note     Name: Wendy Munoz  Clinic Number: [id number]    Therapy Diagnosis:   Encounter Diagnoses   Name Primary?    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  She was compliant with home exercise program.  Response to previous treatment: good pain relief  Functional change: improved walking and tolerance during work day.    Pain: 0-2/10  Location: right groin  and hip      OBJECTIVE     Objective Measures updated at progress report unless specified.       TREATMENT     Total Treatment time (time-based codes) separate from Evaluation: 40 minutes     Wendy received the treatments listed below:        Wendy received therapeutic exercises to develop strength, endurance, posture, and core stabilization for 26 minutes includin set to educate with HEP.     PPT x 30  Small amplitude trunk rotation x 30     Bridges with add ball 2 x 20 staggered  Bridges with abd GTB 2 x 20 staggered  Supine clams GTB 2 x15 in up position reciprocal and with 10# overhead.  Side step with GTB above knee 3 x 20'   Prone hip extension or modified aquaman with contralateral shoulder elevation x 15  Supine 90 90  marching for hip flexor eccentric control x 15  Deadlift 10-15#  2 x 10        Hip add/frog stretch 3 x 30 secs  Trunk rotation stretch 3 x 30 secs  SKTC 3 x 30 secs NP  Jose De Jesus test position hip flexor stretch 3 x 30 secs   Lunge stretch 3 steps with rotation or side flexion 2 x 30 secs     Wendy received the following manual therapy techniques: Manual traction, Myofacial release, and Soft tissue Mobilization were applied to the: iliacus, psoas, QL. Rectus femoris for 14 minutes, including:     Short axis traction  Long axis traction  Sidelying long axis neutral and with mild rotation           Wendy received hot pack for 0 minutes to hip lumbar.       PATIENT EDUCATION AND HOME EXERCISES     Home Exercises Provided and Patient Education Provided     Education provided:   - HEP    Written Home Exercises Provided: Patient instructed to cont prior HEP. Exercises were reviewed and Wendy was able to demonstrate them prior to the end of the session.  Wendy demonstrated good  understanding of the education provided. See EMR under Patient Instructions for exercises provided during therapy sessions    ASSESSMENT     Pt with good relief today and no issues with progression of workload with good mechanics with deadlift and lunge stretch with rotation and or side flexion for modified stretch of hip flexors.        Wendy Is progressing well towards her goals.   Pt prognosis is Good.     Pt will continue to benefit from skilled outpatient physical therapy to address the deficits listed in the problem list box on initial evaluation, provide pt/family education and to maximize pt's level of independence in the home and community environment.     Pt's spiritual, cultural and educational needs considered and pt agreeable to plan of care and goals.     Anticipated barriers to physical therapy: none    Goals:   STG 4 weeks  1.  Pt to be independent with HEP for increased functional mobility and pain control. Progressing 12/7/22  2.  Pt to increase AROM at 30 degs B hip extension for increased  functional mobility and transfers.  3.  Pt to decrease pain to less than 0/10 after session for increased functional mobility. Progressing 12/7/22     Long Term Goals: 12 weeks   1.  Pt to be independent with pain management strategies and verbalize 2 strategies for increased functional mobility and pain control.  2.  Pt to increase B hip extension and abduction to 4+/5 to 5/5 for increased functional mobility and transfers.  3.  Pt to decrease pain to less than 0/10 after 1 mile run for increased functional mobility.  4.  Pt to increase overall tolerance to 45 min of recreational exercise without increased pain overall for return to recreation exercise.     PLAN     Cont with KHURRAM Olvera, PT

## 2022-12-15 NOTE — PATIENT INSTRUCTIONS
Perform on ground like this or with foot on 3 steps lunge forward then rotation to front leg or side bend to front leg.

## 2022-12-20 ENCOUNTER — CLINICAL SUPPORT (OUTPATIENT)
Dept: REHABILITATION | Facility: HOSPITAL | Age: 42
End: 2022-12-20
Attending: STUDENT IN AN ORGANIZED HEALTH CARE EDUCATION/TRAINING PROGRAM
Payer: COMMERCIAL

## 2022-12-20 DIAGNOSIS — M25.551 ACUTE RIGHT HIP PAIN: Primary | ICD-10-CM

## 2022-12-20 DIAGNOSIS — R29.898 WEAKNESS OF BOTH HIPS: ICD-10-CM

## 2022-12-20 DIAGNOSIS — M62.81 WEAKNESS OF TRUNK MUSCULATURE: ICD-10-CM

## 2022-12-20 PROCEDURE — 97140 MANUAL THERAPY 1/> REGIONS: CPT | Mod: PO

## 2022-12-20 PROCEDURE — 97110 THERAPEUTIC EXERCISES: CPT | Mod: PO

## 2022-12-20 NOTE — PROGRESS NOTES
[facility]   Physical Therapy Treatment Note     Name: Wendy Munoz  Clinic Number: [id number]    Therapy Diagnosis:   Encounter Diagnoses   Name Primary?    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  Response to previous treatment: good pain relief  Functional change: improved walking and tolerance during work day.    Pain: 5/10 pre session.   Location: right groin  and hip      OBJECTIVE     Objective Measures updated at progress report unless specified.       TREATMENT     Total Treatment time (time-based codes) separate from Evaluation: 40 minutes     Wendy received the treatments listed below:        Wendy received therapeutic exercises to develop strength, endurance, posture, and core stabilization for 30 minutes includin set to educate with HEP.     PPT x 30  Small amplitude trunk rotation x 30     Bridges with add ball 1 x 20 staggered on bosu  Bridges with abd BTB 1 x 20 staggered on bosu  Supine clams BTB 2 x15 in up position reciprocal and with 10# overhead.  Side step with BTB above knee 2 x 20' legs straight with loaded lead arm 5-10# and semisquat with press forward 10#    Prone hip extension or modified aquaman with contralateral shoulder elevation x 15  Supine 90 90  marching for hip flexor eccentric control x 15  Deadlift 10-15# 2 x 10  +palloff press in lunge and semisquat with green theratubing 2 x 10  + with green theratubing in lunge 2 x 10  + reverse woodchopper x 10# 2x 10        Hip add/frog stretch 3 x 30 secs  Trunk rotation stretch 3 x 30 secs  SKTC 3 x 30 secs NP  Jose De Jesus test position hip flexor stretch 3 x 30 secs   Lunge stretch 3 steps with rotation or side flexion 2 x 30 secs  +prone quad and rectus femoris stretch with towel roll under knee 2 x 20 secs.     Wendy received the following manual therapy techniques: Manual traction, Myofacial release, and Soft tissue Mobilization were applied to the: iliacus, psoas, QL. Rectus femoris for 15 minutes, including:     Short axis traction  Long axis traction  Sidelying long axis neutral and with mild rotation           Wendy received hot pack for 0 minutes to hip lumbar.       PATIENT EDUCATION AND HOME EXERCISES     Home Exercises Provided and Patient Education Provided     Education provided:   - HEP    Written Home Exercises Provided: Patient instructed to cont prior HEP. Exercises were reviewed and Wendy was able to demonstrate them prior to the end of the session.  Wendy demonstrated good  understanding of the education provided. See EMR under Patient Instructions for exercises provided during therapy sessions    ASSESSMENT     Pt with good relief for over week but had flareup with road trip to [place] and then rowing for 10 minutes leading to R hip flexor flareup.  Pt cues for increased abduction in row position and stretch before and after rowing. Pt was well controlled in session with exercise and manual. Pt cues to avoid excess trunk flexion in eccentric portion of rowing.       Wendy Is progressing well towards her goals.   Pt prognosis is Good.     Pt will continue to benefit from skilled  outpatient physical therapy to address the deficits listed in the problem list box on initial evaluation, provide pt/family education and to maximize pt's level of independence in the home and community environment.     Pt's spiritual, cultural and educational needs considered and pt agreeable to plan of care and goals.     Anticipated barriers to physical therapy: none    Goals:   STG 4 weeks  1.  Pt to be independent with HEP for increased functional mobility and pain control. Progressing 12/7/22  2.  Pt to increase AROM at 30 degs B hip extension for increased functional mobility and transfers.  3.  Pt to decrease pain to less than 0/10 after session for increased functional mobility. Progressing 12/7/22     Long Term Goals: 12 weeks   1.  Pt to be independent with pain management strategies and verbalize 2 strategies for increased functional mobility and pain control.  2.  Pt to increase B hip extension and abduction to 4+/5 to 5/5 for increased functional mobility and transfers.  3.  Pt to decrease pain to less than 0/10 after 1 mile run for increased functional mobility.  4.  Pt to increase overall tolerance to 45 min of recreational exercise without increased pain overall for return to recreation exercise.     PLAN     Cont with KHURRAM Olvera, PT

## 2023-01-24 ENCOUNTER — TELEPHONE (OUTPATIENT)
Dept: DERMATOLOGY | Facility: CLINIC | Age: 43
End: 2023-01-24
Payer: COMMERCIAL

## 2023-01-25 ENCOUNTER — CLINICAL SUPPORT (OUTPATIENT)
Dept: REHABILITATION | Facility: HOSPITAL | Age: 43
End: 2023-01-25
Attending: INTERNAL MEDICINE
Payer: COMMERCIAL

## 2023-01-25 DIAGNOSIS — M62.81 WEAKNESS OF TRUNK MUSCULATURE: ICD-10-CM

## 2023-01-25 DIAGNOSIS — R29.898 WEAKNESS OF BOTH HIPS: ICD-10-CM

## 2023-01-25 DIAGNOSIS — M25.551 ACUTE RIGHT HIP PAIN: Primary | ICD-10-CM

## 2023-01-25 PROCEDURE — 97140 MANUAL THERAPY 1/> REGIONS: CPT | Mod: PO

## 2023-01-25 PROCEDURE — 97110 THERAPEUTIC EXERCISES: CPT | Mod: PO

## 2023-01-25 NOTE — PROGRESS NOTES
[facility]   Physical Therapy Treatment Note     Name: Wendy Munoz  Clinic Number: [id number]    Therapy Diagnosis:   Encounter Diagnoses   Name Primary?    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  Response to previous treatment: good pain relief  Functional change: improved to return to rowing.     Pain: 3/10 pre session and 1/10 post session  Location: right groin  and hip      OBJECTIVE   23:  AROM: WNL with B hips and lumbar but with mild tightness with Abduction on R hip  Palpation:  R iliacus mild and R psoas moderate tenderness to touch  Grossly 5/5 to 4+/5 overall B hip and lumbar except  Hip extension and hip abduction 4+/5 and decreased overall endurance/tolerance.       TREATMENT     Total Treatment time (time-based codes) separate from Evaluation: 36 minutes     Wendy received the treatments listed below:        Wendy received therapeutic exercises to develop strength, endurance, posture, and core stabilization for 24 minutes includin set to educate with HEP.     PPT x 30  Small amplitude trunk rotation x 30     Bridges with add ball  1 x 20 staggered on bosu  Bridges with abd BTB 1 x 20 staggered on bosu  Supine clams BTB 2 x15 in up position reciprocal and with 10# overhead.  Side step with BTB above knee 2 x 20' legs straight with loaded lead arm 5-10# and semisquat with press forward 10#   Prone hip extension or modified aquaman with contralateral shoulder elevation x 15  Supine 90 90  marching for hip flexor eccentric control x 15  Deadlift 10-15# 2 x 10  +palloff press in lunge and semisquat with green theratubing 2 x 10  + with green theratubing in lunge 2 x 10  + reverse woodchopper x 10# 2x 10        Hip add/frog stretch 3 x 30 secs  Trunk rotation stretch 3 x 30 secs  SKTC 3 x 30 secs NP  Jose De Jesus test position hip flexor stretch 3 x 30 secs   Lunge stretch 3 steps with rotation or side flexion 2 x 30 secs  +prone quad and rectus femoris stretch with towel roll under knee 2 x 20 secs.     Wendy received the following manual therapy techniques: Manual traction, Myofacial release, and Soft tissue Mobilization were applied to the: iliacus, psoas, QL. Rectus femoris for 12 minutes, including:     Short axis traction  Long axis traction  Sidelying long axis neutral and with mild rotation           Wendy received hot pack for 0 minutes to hip lumbar.       PATIENT EDUCATION AND HOME EXERCISES     Home Exercises Provided and Patient Education Provided     Education provided:   - HEP    Written Home Exercises Provided: Patient instructed to cont prior HEP. Exercises were reviewed and Wendy was able to demonstrate them prior to the end of the session.  Wendy demonstrated good  understanding of the education provided. See EMR under Patient Instructions for exercises provided during therapy sessions    ASSESSMENT     Pt with good insight no flare-ups and increased return to recreational activity. Pt performed updated HEP for thoracolumbar stabilization and continued hip flexor elongation and stretch. Pt continues to progress and has  some pain with combined motion at times with sudden use. Pt achieved 2 of 7 set goals.       Wendy Is progressing well towards her goals.   Pt prognosis is Good.     Pt will continue to benefit from skilled outpatient physical therapy to address the deficits listed in the problem list box on initial evaluation, provide pt/family education and to maximize pt's level of independence in the home and community environment.     Pt's spiritual, cultural and educational needs considered and pt agreeable to plan of care and goals.     Anticipated barriers to physical therapy: none    Goals:   STG 4 weeks  1.  Pt to be independent with HEP for increased functional mobility and pain control. MET 1/24/23  2.  Pt to increase AROM at 30 degs B hip extension for increased functional mobility and transfers. MET 1/24/23  3.  Pt to decrease pain to less than 0/10 after session for increased functional mobility. Progressing 1/24/23     Long Term Goals: 12 weeks   1.  Pt to be independent with pain management strategies and verbalize 2 strategies for increased functional mobility and pain control.  2.  Pt to increase B hip extension and abduction to 4+/5 to 5/5 for increased functional mobility and transfers.  3.  Pt to decrease pain to less than 0/10 after 1 mile run for increased functional mobility.  4.  Pt to increase overall tolerance to 45 min of recreational exercise without increased pain overall for return to recreation exercise.     PLAN     Cont with POC extend to 3/31/23    Idalia Olvera PT

## 2023-01-30 ENCOUNTER — TELEPHONE (OUTPATIENT)
Dept: DERMATOLOGY | Facility: CLINIC | Age: 43
End: 2023-01-30
Payer: COMMERCIAL

## 2023-01-30 NOTE — TELEPHONE ENCOUNTER
----- Message from Anais Montalvo sent at 1/30/2023 11:45 AM CST -----  Wendy Munoz calling regarding Patient Advice for a missed call from April, call back [phone]

## 2023-01-31 ENCOUNTER — TELEPHONE (OUTPATIENT)
Dept: DERMATOLOGY | Facility: CLINIC | Age: 43
End: 2023-01-31
Payer: COMMERCIAL

## 2023-02-01 ENCOUNTER — HOSPITAL ENCOUNTER (OUTPATIENT)
Dept: RADIOLOGY | Facility: OTHER | Age: 43
Discharge: HOME | End: 2023-02-01
Attending: OBSTETRICS & GYNECOLOGY
Payer: COMMERCIAL

## 2023-02-01 DIAGNOSIS — Z12.31 ENCOUNTER FOR SCREENING MAMMOGRAM FOR BREAST CANCER: ICD-10-CM

## 2023-02-01 PROCEDURE — 77063 MAMMO DIGITAL SCREENING BILAT WITH TOMO: ICD-10-PCS | Mod: 26,,, | Performed by: RADIOLOGY

## 2023-02-01 PROCEDURE — 77067 MAMMO DIGITAL SCREENING BILAT WITH TOMO: ICD-10-PCS | Mod: 26,,, | Performed by: RADIOLOGY

## 2023-02-01 PROCEDURE — 77067 SCR MAMMO BI INCL CAD: CPT | Mod: 26,,, | Performed by: RADIOLOGY

## 2023-02-01 PROCEDURE — 77067 SCR MAMMO BI INCL CAD: CPT | Mod: TC

## 2023-02-01 PROCEDURE — 77063 BREAST TOMOSYNTHESIS BI: CPT | Mod: 26,,, | Performed by: RADIOLOGY

## 2023-02-06 ENCOUNTER — PATIENT MESSAGE (OUTPATIENT)
Dept: DERMATOLOGY | Facility: CLINIC | Age: 43
End: 2023-02-06
Payer: COMMERCIAL

## 2023-02-20 NOTE — PROGRESS NOTES
[facility]   Physical Therapy Treatment Note     Name: Wendy Munoz  Clinic Number: [id number]    Therapy Diagnosis:   Encounter Diagnoses   Name Primary?    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    She was compliant with home exercise program.  Response to previous treatment: good pain relief  Functional change: improved to return to rowing.     Pain: 1-2/10 pre session and 1/10 post session  Location: right groin  and hip      OBJECTIVE   23:  AROM: WNL with B hips and lumbar but with mild tightness with extension on R hip  Palpation:  R iliacus moderate and R psoas very mild  tenderness to touch  Grossly 5/5 to 4+/5 overall B hip and lumbar except  Hip extension and hip abduction 4+/5 and decreased overall endurance/tolerance.     SI with normal mobility into hip flexion.       TREATMENT     Total Treatment time (time-based codes) separate from Evaluation: 50 minutes     Wendy received the treatments listed below:        Wendy received therapeutic exercises to develop strength, endurance, posture, and core stabilization for 25 minutes includin set to educate with HEP.     PPT x 30  Small amplitude  trunk rotation x 30     Bridges with add ball 1 x 20 staggered on bosu  Bridges with abd BTB 1 x 20 staggered on bosu  Supine clams BTB 2 x15 in up position reciprocal and with 10# overhead.  Side step with BTB above knee 2 x 20' legs straight with loaded lead arm 5-10# and semisquat with press forward 10#   Prone hip extension or modified aquaman with contralateral shoulder elevation x 15  Supine 90 90  marching for hip flexor eccentric control x 15  Deadlift 10-15# 2 x 10  +palloff press in lunge and semisquat with green theratubing 2 x 10  + with green theratubing in lunge 2 x 10  + reverse woodchopper x 10# 2x 10    Leg press 3 x 15 60# with progressive lowering into more hip flexion  Rebounder for jumping with 62.5# x 15-20 reps in neutral, laterally, cw and CCW  Jogging:walking 1:1 ratio 75' 5 laps        Hip add/frog stretch 3 x 30 secs  Trunk rotation stretch 3 x 30 secs  SKTC 3 x 30 secs NP  Jose De Jesus test position hip flexor stretch 3 x 30 secs   Lunge stretch 3 steps with rotation or side flexion 2 x 30 secs  +prone quad and rectus femoris stretch with towel roll under knee 2 x 20 secs.     Wendy received the following manual therapy techniques: Manual traction, Myofacial release, and Soft tissue Mobilization were applied to the: iliacus, psoas, QL. Rectus femoris for 25 minutes, including:    Self education with STM use of small lacrosse ball to iliacus     Short axis traction  Long axis traction  Lateral traction with belt into flexion and IR/ER of hip and combined motion  Prone traction  Prone PA hip glide grade III  Sidelying long axis neutral and with mild rotation           Wendy received hot pack for 0 minutes to hip lumbar.       PATIENT EDUCATION AND HOME EXERCISES     Home Exercises Provided and Patient Education Provided     Education provided:   - HEP    Written Home Exercises Provided: Patient instructed to cont prior HEP. Exercises were reviewed and Wendy was able to demonstrate  them prior to the end of the session.  Wendy demonstrated good  understanding of the education provided. See EMR under Patient Instructions for exercises provided during therapy sessions    ASSESSMENT     Pt with good insight no flare-ups and increased return to recreational activity returned to prior level except rowing and jogging. Pt achieved 3 of 7 set goals. Pt with good mechanics and uniformity with rebounder jumps near 50% of bodyweight and good hip extension jogging with good relief with increased hip extension and decreased iliacus tightness with jogging. Pt with good insight and good self release with small lacrosse ball.       Wendy Is progressing well towards her goals.   Pt prognosis is Good.     Pt will continue to benefit from skilled outpatient physical therapy to address the deficits listed in the problem list box on initial evaluation, provide pt/family education and to maximize pt's level of independence in the home and community environment.     Pt's spiritual, cultural and educational needs considered and pt agreeable to plan of care and goals.     Anticipated barriers to physical therapy: none    Goals:   STG 4 weeks  1.  Pt to be independent with HEP for increased functional mobility and pain control. MET 1/24/23  2.  Pt to increase AROM at 30 degs B hip extension for increased functional mobility and transfers. MET 1/24/23  3.  Pt to decrease pain to less than 0/10 after session for increased functional mobility. Progressing 2/23/23     Long Term Goals: 12 weeks   1.  Pt to be independent with pain management strategies and verbalize 2 strategies for increased functional mobility and pain control. MET 2/23/23  2.  Pt to increase B hip extension and abduction to 4+/5 to 5/5 for increased functional mobility and transfers.  3.  Pt to decrease pain to less than 0/10 after 1 mile run for increased functional mobility.  4.  Pt to increase overall tolerance to 45 min of recreational exercise  without increased pain overall for return to recreation exercise. Progressing 2/23/23    PLAN     Cont with POC extend to 3/31/23    Idalia Olvera, PT

## 2023-02-23 ENCOUNTER — LAB VISIT (OUTPATIENT)
Dept: LAB | Facility: OTHER | Age: 43
End: 2023-02-23
Attending: OBSTETRICS & GYNECOLOGY
Payer: COMMERCIAL

## 2023-02-23 ENCOUNTER — CLINICAL SUPPORT (OUTPATIENT)
Dept: REHABILITATION | Facility: HOSPITAL | Age: 43
End: 2023-02-23
Attending: STUDENT IN AN ORGANIZED HEALTH CARE EDUCATION/TRAINING PROGRAM
Payer: COMMERCIAL

## 2023-02-23 DIAGNOSIS — M25.551 ACUTE RIGHT HIP PAIN: Primary | ICD-10-CM

## 2023-02-23 DIAGNOSIS — Z11.3 SCREENING FOR STDS (SEXUALLY TRANSMITTED DISEASES): ICD-10-CM

## 2023-02-23 DIAGNOSIS — R29.898 WEAKNESS OF BOTH HIPS: ICD-10-CM

## 2023-02-23 DIAGNOSIS — M62.81 WEAKNESS OF TRUNK MUSCULATURE: ICD-10-CM

## 2023-02-23 LAB
HBV SURFACE AG SERPL QL IA: NORMAL
HCV AB SERPL QL IA: NORMAL
HIV 1+2 AB+HIV1 P24 AG SERPL QL IA: NORMAL
RPR SER QL: NORMAL

## 2023-02-23 PROCEDURE — 97110 THERAPEUTIC EXERCISES: CPT | Mod: PO

## 2023-02-23 PROCEDURE — 87340 HEPATITIS B SURFACE AG IA: CPT | Performed by: OBSTETRICS & GYNECOLOGY

## 2023-02-23 PROCEDURE — 87389 HIV-1 AG W/HIV-1&-2 AB AG IA: CPT | Performed by: OBSTETRICS & GYNECOLOGY

## 2023-02-23 PROCEDURE — 97140 MANUAL THERAPY 1/> REGIONS: CPT | Mod: PO

## 2023-02-23 PROCEDURE — 86803 HEPATITIS C AB TEST: CPT | Performed by: OBSTETRICS & GYNECOLOGY

## 2023-02-23 PROCEDURE — 36415 COLL VENOUS BLD VENIPUNCTURE: CPT | Performed by: OBSTETRICS & GYNECOLOGY

## 2023-02-23 PROCEDURE — 86592 SYPHILIS TEST NON-TREP QUAL: CPT | Performed by: OBSTETRICS & GYNECOLOGY

## 2023-03-01 NOTE — PROGRESS NOTES
[facility]   Physical Therapy Treatment Note     Name: Wendy Munoz  Clinic Number: [id number]    Therapy Diagnosis:   Encounter Diagnoses   Name Primary?    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     She was compliant with home exercise program.  Response to previous treatment: good pain relief  Functional change: improved to return to rowing.     Pain: 1-2/10 pre session and 1/10 post session  Location: right groin  and hip      OBJECTIVE   23:  AROM: WNL with B hips and lumbar but with mild tightness with extension on R hip  Palpation:  R iliacus moderate and R psoas very mild  tenderness to touch  Grossly 5/5 to 4+/5 overall B hip and lumbar except  Hip extension and hip abduction 4+/5 and decreased overall endurance/tolerance.     SI with normal mobility into hip flexion.       TREATMENT     Total Treatment time (time-based codes) separate from Evaluation: 50 minutes     Wendy received the treatments listed below:        Wendy received therapeutic exercises to develop strength, endurance, posture, and core stabilization for 32 minutes includin set to educate with HEP.     PPT x 30  Small amplitude trunk rotation x 30    "  Bridges with add ball 1 x 20 staggered on bosu  Bridges with abd BTB 1 x 20 staggered on bosu  Supine clams BTB 2 x15 in up position reciprocal and with 10# overhead.  Side step with BTB above knee 2 x 20' legs straight with loaded lead arm 5-10# and semisquat with press forward 10#   Prone hip extension or modified aquaman with contralateral shoulder elevation x 15  Supine 90 90  marching for hip flexor eccentric control x 15  Deadlift 10-15# 2 x 10  +palloff press in lunge and semisquat with green theratubing 2 x 10  + with green theratubing in lunge 2 x 10  + reverse woodchopper x 10# 2x 10    Leg press on rebounder 3 x 15 60# with progressive lowering into more hip flexion  Rebounder for jumping with 62.5# x 15-20 reps in neutral, laterally, cw and CCW  Jogging:walking 1:1 ratio 75' 5 laps  Step up 20" with use of TRX as needed 5 x 5-10 reps in various settings:  with contralateral hip flexion, contralateral hip extension  Sled push 2 x 50' with 90#  Human sled with moderate manual resistance for increase dynamic stabilization 2 x 50'  Walking lunges 2 x 30'        Hip add/frog stretch 3 x 30 secs  Trunk rotation stretch 3 x 30 secs  SKTC 3 x 30 secs NP  Jose De Jesus test position hip flexor stretch 3 x 30 secs   Lunge stretch 3 steps with rotation or side flexion 2 x 30 secs  +prone quad and rectus femoris stretch with towel roll under knee 2 x 20 secs.     Wendy received the following manual therapy techniques: Manual traction, Myofacial release, and Soft tissue Mobilization were applied to the: iliacus, psoas, QL. Rectus femoris for 12 minutes, including:    Self education with STM use of small lacrosse ball to iliacus     Short axis traction  Long axis traction  Lateral traction with belt into flexion and IR/ER of hip and combined motion  Prone traction  Prone PA hip glide grade III  Sidelying long axis neutral and with mild rotation           Wendy received hot pack for 0 minutes to hip lumbar.   "     PATIENT EDUCATION AND HOME EXERCISES     Home Exercises Provided and Patient Education Provided     Education provided:   - HEP    Written Home Exercises Provided: Patient instructed to cont prior HEP. Exercises were reviewed and Wendy was able to demonstrate them prior to the end of the session.  Wendy demonstrated good  understanding of the education provided. See EMR under Patient Instructions for exercises provided during therapy sessions    ASSESSMENT     Pt with good insight with progression of running as tolerated with less overall guarding from original R side pain less guarding than L today and progressing without issues and progressive exercise without pain with good eccentric control and good lumbar pelvic control and posture during session.       Wendy Is progressing well towards her goals.   Pt prognosis is Good.     Pt will continue to benefit from skilled outpatient physical therapy to address the deficits listed in the problem list box on initial evaluation, provide pt/family education and to maximize pt's level of independence in the home and community environment.     Pt's spiritual, cultural and educational needs considered and pt agreeable to plan of care and goals.     Anticipated barriers to physical therapy: none    Goals:   STG 4 weeks  1.  Pt to be independent with HEP for increased functional mobility and pain control. MET 1/24/23  2.  Pt to increase AROM at 30 degs B hip extension for increased functional mobility and transfers. MET 1/24/23  3.  Pt to decrease pain to less than 0/10 after session for increased functional mobility. Progressing 2/23/23     Long Term Goals: 12 weeks   1.  Pt to be independent with pain management strategies and verbalize 2 strategies for increased functional mobility and pain control. MET 2/23/23  2.  Pt to increase B hip extension and abduction to 4+/5 to 5/5 for increased functional mobility and transfers.  3.  Pt to decrease pain to less than 0/10  after 1 mile run for increased functional mobility.  4.  Pt to increase overall tolerance to 45 min of recreational exercise without increased pain overall for return to recreation exercise. Progressing 2/23/23    PLAN     Cont with POC extend to 3/31/23    Idalia Olvera, PT

## 2023-03-02 ENCOUNTER — CLINICAL SUPPORT (OUTPATIENT)
Dept: REHABILITATION | Facility: HOSPITAL | Age: 43
End: 2023-03-02
Attending: STUDENT IN AN ORGANIZED HEALTH CARE EDUCATION/TRAINING PROGRAM
Payer: COMMERCIAL

## 2023-03-02 DIAGNOSIS — M62.81 WEAKNESS OF TRUNK MUSCULATURE: ICD-10-CM

## 2023-03-02 DIAGNOSIS — M25.551 ACUTE RIGHT HIP PAIN: Primary | ICD-10-CM

## 2023-03-02 DIAGNOSIS — R29.898 WEAKNESS OF BOTH HIPS: ICD-10-CM

## 2023-03-02 PROCEDURE — 97140 MANUAL THERAPY 1/> REGIONS: CPT | Mod: PO

## 2023-03-02 PROCEDURE — 97110 THERAPEUTIC EXERCISES: CPT | Mod: PO

## 2023-03-03 ENCOUNTER — OFFICE VISIT (OUTPATIENT)
Dept: DERMATOLOGY | Facility: CLINIC | Age: 43
End: 2023-03-03
Payer: COMMERCIAL

## 2023-03-03 DIAGNOSIS — Z12.83 SKIN CANCER SCREENING: Primary | ICD-10-CM

## 2023-03-03 DIAGNOSIS — L82.1 SK (SEBORRHEIC KERATOSIS): ICD-10-CM

## 2023-03-03 DIAGNOSIS — D22.9 MULTIPLE BENIGN NEVI: ICD-10-CM

## 2023-03-03 DIAGNOSIS — D48.5 NEOPLASM OF UNCERTAIN BEHAVIOR OF SKIN: ICD-10-CM

## 2023-03-03 DIAGNOSIS — L98.8 RHYTIDES: ICD-10-CM

## 2023-03-03 PROCEDURE — 11103 TANGNTL BX SKIN EA SEP/ADDL: CPT | Mod: S$GLB,,, | Performed by: DERMATOLOGY

## 2023-03-03 PROCEDURE — 1159F PR MEDICATION LIST DOCUMENTED IN MEDICAL RECORD: ICD-10-PCS | Mod: CPTII,S$GLB,, | Performed by: DERMATOLOGY

## 2023-03-03 PROCEDURE — 88305 TISSUE EXAM BY PATHOLOGIST: ICD-10-PCS | Mod: 26,,, | Performed by: PATHOLOGY

## 2023-03-03 PROCEDURE — 11102 TANGNTL BX SKIN SINGLE LES: CPT | Mod: S$GLB,,, | Performed by: DERMATOLOGY

## 2023-03-03 PROCEDURE — 88341 IMHCHEM/IMCYTCHM EA ADD ANTB: CPT | Performed by: PATHOLOGY

## 2023-03-03 PROCEDURE — 88305 TISSUE EXAM BY PATHOLOGIST: CPT | Mod: 26,,, | Performed by: PATHOLOGY

## 2023-03-03 PROCEDURE — 11103 PR TANGENTIAL BIOPSY, SKIN, EA ADDTL LESION: ICD-10-PCS | Mod: S$GLB,,, | Performed by: DERMATOLOGY

## 2023-03-03 PROCEDURE — 11102 PR TANGENTIAL BIOPSY, SKIN, SINGLE LESION: ICD-10-PCS | Mod: S$GLB,,, | Performed by: DERMATOLOGY

## 2023-03-03 PROCEDURE — 88305 TISSUE EXAM BY PATHOLOGIST: CPT | Mod: 59 | Performed by: PATHOLOGY

## 2023-03-03 PROCEDURE — 88342 IMHCHEM/IMCYTCHM 1ST ANTB: CPT | Performed by: PATHOLOGY

## 2023-03-03 PROCEDURE — 99213 OFFICE O/P EST LOW 20 MIN: CPT | Mod: 25,S$GLB,, | Performed by: DERMATOLOGY

## 2023-03-03 PROCEDURE — 88341 PR IHC OR ICC EACH ADD'L SINGLE ANTIBODY  STAINPR: ICD-10-PCS | Mod: 26,,, | Performed by: PATHOLOGY

## 2023-03-03 PROCEDURE — 88342 CHG IMMUNOCYTOCHEMISTRY: ICD-10-PCS | Mod: 26,,, | Performed by: PATHOLOGY

## 2023-03-03 PROCEDURE — 99213 PR OFFICE/OUTPT VISIT, EST, LEVL III, 20-29 MIN: ICD-10-PCS | Mod: 25,S$GLB,, | Performed by: DERMATOLOGY

## 2023-03-03 PROCEDURE — 99999 PR PBB SHADOW E&M-EST. PATIENT-LVL III: ICD-10-PCS | Mod: PBBFAC,,, | Performed by: DERMATOLOGY

## 2023-03-03 PROCEDURE — 88341 IMHCHEM/IMCYTCHM EA ADD ANTB: CPT | Mod: 26,,, | Performed by: PATHOLOGY

## 2023-03-03 PROCEDURE — 1159F MED LIST DOCD IN RCRD: CPT | Mod: CPTII,S$GLB,, | Performed by: DERMATOLOGY

## 2023-03-03 PROCEDURE — 1160F PR REVIEW ALL MEDS BY PRESCRIBER/CLIN PHARMACIST DOCUMENTED: ICD-10-PCS | Mod: CPTII,S$GLB,, | Performed by: DERMATOLOGY

## 2023-03-03 PROCEDURE — 1160F RVW MEDS BY RX/DR IN RCRD: CPT | Mod: CPTII,S$GLB,, | Performed by: DERMATOLOGY

## 2023-03-03 PROCEDURE — 88342 IMHCHEM/IMCYTCHM 1ST ANTB: CPT | Mod: 26,,, | Performed by: PATHOLOGY

## 2023-03-03 PROCEDURE — 99999 PR PBB SHADOW E&M-EST. PATIENT-LVL III: CPT | Mod: PBBFAC,,, | Performed by: DERMATOLOGY

## 2023-03-03 RX ORDER — TRETINOIN 0.25 MG/G
CREAM TOPICAL
Qty: 45 G | Refills: 3 | Status: SHIPPED | OUTPATIENT
Start: 2023-03-03

## 2023-03-03 NOTE — PATIENT INSTRUCTIONS
Sun Protection      The [facility] would like to remind you of the importance of sun protection all year round and particularly during the summer when the suns rays are the strongest. It has been proven that both acute and chronic sun exposure damages our cells and leads to skin cancer. Beyond skin cancer, the sun causes 90% of the symptoms of premature skin aging, including wrinkles, lentigines (brown spots), and thin, easily bruised skin. Proper sun protection can help prevent these unwanted conditions.    Many patients report that they dont go in the sun. It has been shown that the average person receives 18 hours of incidental sun exposure per week during activities such as walking through parking lots, driving, or sitting next to windows. This accumulates to several bad sunburns per year!    In choosing sunscreen, you want one that protects against both UVA and UVB rays (broad spectrum). It is recommended that you use one of SPF 30 or higher. It is important to apply the sunscreen about 20 minutes prior to sun exposure. Most sunscreens are chemical sunscreens and a reaction must take place in the skin so that they are effective. If they are applied and then you are immediately exposed to the sun or start sweating, this reaction has not had time to take place and you are therefore unprotected. Sunscreen needs to be reapplied every 2 hours if you are participating in water sports or sweating. We recommend Elta MD or CeraVe sunscreens for daily use; however there are many options and it is most important for you to find one that you will use on a consistent basis.    If you have sensitive skin, you may do best with a sunscreen that contains only physical blockers in the active ingredient section. The only physical blockers available in the [place] currently are titanium dioxide or zinc oxide. These are typically thicker and harder to apply, however they afford very good protection.  Neutrogena Sensitive Skin, Blue Lizard Sensitive Skin (pink top) or Neutrogena Pure and Free are popular ones.     Aside from sunscreen, clothes with UV protection (UPF), wide brimmed hats, and sunglasses are other means of sun protection that we recommend.      Based on a recent study (6/2021) and out of an abundance of caution, we are recommending that you AVOID the following sunscreens as they may contain the carcinogen, benzene:    Spray and gel sunscreens  Any [employer] or [employer] brands as well as Max Block and TopCare brands   Neutrogena Ultra Sheer Dry-touch Water Resistant Sunscreen LOTION SPF 70   Neutrogena Sheer Zinc Dry-touch Face Sunscreen LOTION SPF 50   5.   Aveeno Baby Continuous Protection Sensitive Skin Sunscreen LOTION - Broad Spectrum SPF 50    Please note that Benzene is not an ingredient or the degradation product of any ingredient in any sunscreen. This study suggested that the findings are a result of contamination in the manufacturing process. At this point, we don't know how effectively Benzene gets through the skin, if it gets absorbed systemically, and what effects it may have.     We do know that ultraviolet radiation is a well-established carcinogen. Please use daily sun protection/avoidance and use of at least SPF 30, broad-spectrum sunscreen not listed above.                       [facility] - DERMATOLOGY [address]  [place] [address]  Dept: [phone]  Dept Fax: [phone]                                                                               Shave Biopsy Wound Care    Your doctor has performed a shave biopsy today.  A band aid and vaseline ointment has been placed over the site.  This should remain in place for NO LONGER THAN 48 hours.  It is fine to remove the bandaid after 24 hours, if the area is no longer bleeding. It is recommended that you keep the area dry (do not wet)) for the first 24 hours.  After 24 hours, wash the  area with warm soap and water and apply Vaseline jelly.  Many patients prefer to use Neosporin or Bacitracin ointment.  This is acceptable; however, know that you can develop an allergy to this medication even if you have used it safely for years.  It is important to keep the area moist.  Letting it dry out and get air slows healing time, and will worsen the scar.        If you notice increasing redness, tenderness, pain, or yellow drainage at the biopsy site, please notify your doctor.  These are signs of an infection.    If your biopsy site is bleeding, apply firm pressure for 15 minutes straight.  Repeat for another 15 minutes, if it is still bleeding.   If the surgical site continues to bleed, then please contact your doctor.      For [facility] users:   You will receive your biopsy results in [facility] as soon as they are available. Please be assured that your physician/provider will review your results and will then determine what further treatment, evaluation, or planning is required. You should be contacted by your physician's/provider's office within 5 business days of receiving your results; If not, please reach out to directly. This is one more way [facility] is putting you first.     [facility] [place] [address]/ [phone] FAX/ [email or web address]

## 2023-03-03 NOTE — PROGRESS NOTES
Subjective:       Patient ID:  Wendy Munoz is a 43 y.o. female who presents for   Chief Complaint   Patient presents with    Skin Check       HPI  History of Present Illness: The patient presents for skin check.    The patient was last seen on: 1/14/2022 for TBSE.  This is a high risk patient here to check for the development of new lesions.   H/o BCC right clavicle s/p ED&C 03/2018     Lesion on L arm noted last visit is still present, not growing. Asymptomatic and no prior treatment.    Also interested in discussing botox.    Review of Systems   Constitutional: Negative.  Negative for fever and chills.   Skin:  Negative for daily sunscreen use and activity-related sunscreen use.   Hematologic/Lymphatic: Does not bruise/bleed easily.      Objective:    Physical Exam   Constitutional: She appears well-developed and well-nourished. No distress.   Genitourinary:         Neurological: She is alert and oriented to person, place, and time. She is not disoriented.   Psychiatric: She has a normal mood and affect.   Skin:   Areas Examined (abnormalities noted in diagram):   Scalp / Hair Palpated and Inspected  Head / Face Inspection Performed  Neck Inspection Performed  Chest / Axilla Inspection Performed  Abdomen Inspection Performed  Genitals / Buttocks / Groin Inspection Performed  Back Inspection Performed  RUE Inspected  LUE Inspection Performed  RLE Inspected  LLE Inspection Performed  Nails and Digits Inspection Performed                 Diagram Legend     Erythematous scaling macule/papule c/w actinic keratosis       Vascular papule c/w angioma      Pigmented verrucoid papule/plaque c/w seborrheic keratosis      Yellow umbilicated papule c/w sebaceous hyperplasia      Irregularly shaped tan macule c/w lentigo     1-2 mm smooth white papules consistent with Milia      Movable subcutaneous cyst with punctum c/w epidermal inclusion cyst      Subcutaneous movable cyst c/w pilar cyst      Firm pink to brown  papule c/w dermatofibroma      Pedunculated fleshy papule(s) c/w skin tag(s)      Evenly pigmented macule c/w junctional nevus     Mildly variegated pigmented, slightly irregular-bordered macule c/w mildly atypical nevus      Flesh colored to evenly pigmented papule c/w intradermal nevus       Pink pearly papule/plaque c/w basal cell carcinoma      Erythematous hyperkeratotic cursted plaque c/w SCC      Surgical scar with no sign of skin cancer recurrence      Open and closed comedones      Inflammatory papules and pustules      Verrucoid papule consistent consistent with wart     Erythematous eczematous patches and plaques     Dystrophic onycholytic nail with subungual debris c/w onychomycosis     Umbilicated papule    Erythematous-base heme-crusted tan verrucoid plaque consistent with inflamed seborrheic keratosis     Erythematous Silvery Scaling Plaque c/w Psoriasis     See annotation      Assessment / Plan:    Neoplasm of uncertain behavior of skin  Shave biopsy procedure note:    Shave biopsy performed after verbal consent including risk of infection, scar, recurrence, need for additional treatment of site. Area prepped with alcohol, anesthetized with approximately 1.0cc of 1% lidocaine with epinephrine. Lesional tissue shaved with razor blade. Hemostasis achieved with application of aluminum chloride followed by hyfrecation. No complications. Dressing applied. Wound care explained.    -     Specimen to Pathology, Dermatology  Pathology Orders:       Normal Orders This Visit    Specimen to Pathology, Dermatology     Comments:    Number of Specimens:->2  ------------------------->-------------------------  Spec 1 Procedure:->Biopsy  Spec 1 Clinical Impression:->r/o BCC vs AK vs lichenoid  keratosis vs other  Spec 1 Source:->L upper arm - upper anterior  ------------------------->-------------------------  Spec 2 Procedure:->Biopsy  Spec 2 Clinical Impression:->r/o atypical nevus vs other  Spec 2 Source:->L upper  arm - lower posterior    Questions:    Procedure Type: Dermatology and skin neoplasms    Number of Specimens: 2    ------------------------: -------------------------    Spec 1 Procedure: Biopsy    Spec 1 Clinical Impression: r/o BCC vs AK vs lichenoid keratosis vs other    Spec 1 Source: L upper arm - upper anterior    ------------------------: -------------------------    Spec 2 Procedure: Biopsy    Spec 2 Clinical Impression: r/o atypical nevus vs other    Spec 2 Source: L upper arm - lower posterior    Release to patient:           Skin cancer screening  Total body skin examination performed today including at least 12 points as noted in physical examination. Suspicious lesions noted above.  Patient instructed in importance of daily broad spectrum sunscreen use with spf at least 30. Sun avoidance and topical protection/protective clothing discussed.    Multiple benign nevi  Benign-appearing nevi present on exam today. Reassurance provided. Periodically examine moles and return to clinic if any moles change or become symptomatic (bleeding, itching, pain, etc).    SK (seborrheic keratosis)  These are benign inherited growths without a malignant potential. Reassurance given to patient. No treatment is necessary.   Treatment of benign, asymptomatic lesions may be considered cosmetic.  Warned about risk of hypo- or hyperpigmentation with treatment and risk of recurrence.    Rhytides  -     tretinoin (RETIN-A) 0.025 % cream; Apply small amount to entire face every night at bedtime. Wash off every morning and apply sunscreen.  Dispense: 45 g; Refill: 3  Counseled pt that the retinoid may make the skin dry, red, and irritated. May moisturize daily with a non-comedogenic moisturizer. If still dry, would recommend using the retinoid every other night or less frequently as tolerated. Avoid using around corners of eyes, nose, and mouth.  Patient instructed in importance of daily broad spectrum sunscreen use with spf at  least 30. Sun avoidance and topical protection/protective clothing discussed.    Discussed botox and pt will schedule cosmetic appt for this next month.      Follow up in about 1 year (around 3/3/2024) for skin check or sooner for any concerns.

## 2023-03-08 ENCOUNTER — CLINICAL SUPPORT (OUTPATIENT)
Dept: REHABILITATION | Facility: HOSPITAL | Age: 43
End: 2023-03-08
Attending: INTERNAL MEDICINE
Payer: COMMERCIAL

## 2023-03-08 ENCOUNTER — TELEPHONE (OUTPATIENT)
Dept: PHARMACY | Facility: CLINIC | Age: 43
End: 2023-03-08
Payer: COMMERCIAL

## 2023-03-08 DIAGNOSIS — M62.81 WEAKNESS OF TRUNK MUSCULATURE: ICD-10-CM

## 2023-03-08 DIAGNOSIS — R29.898 WEAKNESS OF BOTH HIPS: ICD-10-CM

## 2023-03-08 DIAGNOSIS — M25.551 ACUTE RIGHT HIP PAIN: Primary | ICD-10-CM

## 2023-03-08 PROCEDURE — 97140 MANUAL THERAPY 1/> REGIONS: CPT | Mod: PO

## 2023-03-08 PROCEDURE — 97110 THERAPEUTIC EXERCISES: CPT | Mod: PO

## 2023-03-08 NOTE — PROGRESS NOTES
[facility]   Physical Therapy Treatment Note     Name: Wendy Munoz  Clinic Number: [id number]    Therapy Diagnosis:   Encounter Diagnoses   Name Primary?    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     She was compliant with home exercise program.  Response to previous treatment: good pain relief but soreness for 36 hrs  Functional change: improved jogging without increased pain.     Pain: 1-2/10 pre session and 110 post session  Location: right groin  and hip      OBJECTIVE   23:  AROM: WNL with B hips and lumbar but with mild tightness with extension on R hip  Palpation:  R iliacus moderate and R psoas very mild  tenderness to touch  Grossly 5/5 to 4+/5 overall B hip and lumbar except  Hip extension and hip abduction 4+/5 and decreased overall endurance/tolerance.     SI with normal mobility into hip flexion.       TREATMENT     Total Treatment time (time-based codes) separate from Evaluation: 66 minutes     Wendy received the treatments listed below:        Wendy received therapeutic exercises to develop strength, endurance, posture, and core stabilization for 44 minutes includin set to educate with HEP.     PPT x  "30  Small amplitude trunk rotation x 30     Bridges with add ball 1 x 20 staggered on bosu  Bridges with abd BTB 1 x 20 staggered on bosu  Supine clams BTB 2 x15 in up position reciprocal and with 10# overhead.  Side step with BTB above knee 2 x 20' legs straight with loaded lead arm 5-10# and semisquat with press forward 10#   Prone hip extension or modified aquaman with contralateral shoulder elevation x 15  Supine 90 90  marching for hip flexor eccentric control 2x 15  Deadlift 10-15# 2 x 10  +palloff press in lunge and semisquat with black theratubing 2 x 10  + with green theratubing in lunge 2 x 10  + reverse woodchopper x 10# 2x 10    Leg press on rebounder 3 x 15 100# with progressive lowering into more hip flexion  Rebounder for jumping with 62.5# x 15-20 reps in neutral, laterally, cw and CCW  Jogging:walking 1:1 ratio 75' 5 laps  Step up 20" with use of TRX as needed 3 x 5-10 reps in various settings:  with contralateral hip flexion, contralateral hip extension  Sled push 2 x 50' with 90#  Human sled with moderate manual resistance for increase dynamic stabilization 2 x 50'  Walking lunges 2 x 30'  Lunge stretch with neutral, side flexion and rotation 3 x 30 secs  Sidelying planks and double leg lift 10x to tolerance  Spider planks x 1 min         Hip add/frog stretch 3 x 30 secs  Trunk rotation stretch 3 x 30 secs  SKTC 3 x 30 secs NP  Jose De Jesus test position hip flexor stretch 3 x 30 secs   Lunge stretch 3 steps with rotation or side flexion 2 x 30 secs  +prone quad and rectus femoris stretch with towel roll under knee 2 x 20 secs.     Wendy received the following manual therapy techniques: Manual traction, Myofacial release, and Soft tissue Mobilization were applied to the: iliacus, psoas, QL. Rectus femoris for 22 minutes, including:    Self education with STM use of small lacrosse ball to iliacus     Short axis traction  Long axis traction  Lateral traction with belt into flexion and IR/ER " of hip and combined motion  Prone traction  Prone PA hip glide grade III  Sidelying long axis neutral and with mild rotation           Wendy received hot pack for 0 minutes to hip lumbar.       PATIENT EDUCATION AND HOME EXERCISES     Home Exercises Provided and Patient Education Provided     Education provided:   - HEP    Written Home Exercises Provided: Patient instructed to cont prior HEP. Exercises were reviewed and Wendy was able to demonstrate them prior to the end of the session.  Wendy demonstrated good  understanding of the education provided. See EMR under Patient Instructions for exercises provided during therapy sessions    ASSESSMENT     Pt with good insight with progression of running and walking program up to 60 minutes without increased pain. Pt with some residual guarding at tightness on L iliacus and B psoas but R iliacus full resolved. Pt with good exercise tolerance last time and today.      Wendy Is progressing well towards her goals.   Pt prognosis is Good.     Pt will continue to benefit from skilled outpatient physical therapy to address the deficits listed in the problem list box on initial evaluation, provide pt/family education and to maximize pt's level of independence in the home and community environment.     Pt's spiritual, cultural and educational needs considered and pt agreeable to plan of care and goals.     Anticipated barriers to physical therapy: none    Goals:   STG 4 weeks  1.  Pt to be independent with HEP for increased functional mobility and pain control. MET 1/24/23  2.  Pt to increase AROM at 30 degs B hip extension for increased functional mobility and transfers. MET 1/24/23  3.  Pt to decrease pain to less than 0/10 after session for increased functional mobility. Progressing 3/8/23     Long Term Goals: 12 weeks   1.  Pt to be independent with pain management strategies and verbalize 2 strategies for increased functional mobility and pain control. MET 2/23/23  2.   Pt to increase B hip extension and abduction to 4+/5 to 5/5 for increased functional mobility and transfers.  3.  Pt to decrease pain to less than 0/10 after 1 mile run for increased functional mobility. Progressing 3/8/23  4.  Pt to increase overall tolerance to 45 min of recreational exercise without increased pain overall for return to recreation exercise. Progressing 3/8/23    PLAN     Cont with POC extend to 3/31/23    Idalia Olvera, PT

## 2023-03-16 ENCOUNTER — CLINICAL SUPPORT (OUTPATIENT)
Dept: REHABILITATION | Facility: HOSPITAL | Age: 43
End: 2023-03-16
Attending: STUDENT IN AN ORGANIZED HEALTH CARE EDUCATION/TRAINING PROGRAM
Payer: COMMERCIAL

## 2023-03-16 ENCOUNTER — OFFICE VISIT (OUTPATIENT)
Dept: OPTOMETRY | Facility: CLINIC | Age: 43
End: 2023-03-16
Payer: COMMERCIAL

## 2023-03-16 DIAGNOSIS — M25.551 ACUTE RIGHT HIP PAIN: Primary | ICD-10-CM

## 2023-03-16 DIAGNOSIS — H52.13 MYOPIA OF BOTH EYES: ICD-10-CM

## 2023-03-16 DIAGNOSIS — H53.10 SUBJECTIVE VISUAL DISTURBANCE: ICD-10-CM

## 2023-03-16 DIAGNOSIS — G43.719 CHRONIC MIGRAINE WITHOUT AURA, INTRACTABLE, WITHOUT STATUS MIGRAINOSUS: Primary | ICD-10-CM

## 2023-03-16 DIAGNOSIS — M62.81 WEAKNESS OF TRUNK MUSCULATURE: ICD-10-CM

## 2023-03-16 DIAGNOSIS — R29.898 WEAKNESS OF BOTH HIPS: ICD-10-CM

## 2023-03-16 DIAGNOSIS — Z01.00 COMPLETE EYE EXAM, ENCOUNTER FOR: ICD-10-CM

## 2023-03-16 PROCEDURE — 92015 PR REFRACTION: ICD-10-PCS | Mod: S$GLB,,, | Performed by: OPTOMETRIST

## 2023-03-16 PROCEDURE — 99999 PR PBB SHADOW E&M-EST. PATIENT-LVL III: ICD-10-PCS | Mod: PBBFAC,,, | Performed by: OPTOMETRIST

## 2023-03-16 PROCEDURE — 97110 THERAPEUTIC EXERCISES: CPT | Mod: PO

## 2023-03-16 PROCEDURE — 99999 PR PBB SHADOW E&M-EST. PATIENT-LVL III: CPT | Mod: PBBFAC,,, | Performed by: OPTOMETRIST

## 2023-03-16 PROCEDURE — 99204 PR OFFICE/OUTPT VISIT, NEW, LEVL IV, 45-59 MIN: ICD-10-PCS | Mod: S$GLB,,, | Performed by: OPTOMETRIST

## 2023-03-16 PROCEDURE — 92015 DETERMINE REFRACTIVE STATE: CPT | Mod: S$GLB,,, | Performed by: OPTOMETRIST

## 2023-03-16 PROCEDURE — 97140 MANUAL THERAPY 1/> REGIONS: CPT | Mod: PO

## 2023-03-16 PROCEDURE — 99204 OFFICE O/P NEW MOD 45 MIN: CPT | Mod: S$GLB,,, | Performed by: OPTOMETRIST

## 2023-03-16 PROCEDURE — 1159F MED LIST DOCD IN RCRD: CPT | Mod: CPTII,S$GLB,, | Performed by: OPTOMETRIST

## 2023-03-16 PROCEDURE — 1159F PR MEDICATION LIST DOCUMENTED IN MEDICAL RECORD: ICD-10-PCS | Mod: CPTII,S$GLB,, | Performed by: OPTOMETRIST

## 2023-03-16 NOTE — PROGRESS NOTES
HPI    Last eye exam was approximately 4 years ago.  Patient states decrease in distance vision. Has glasses but doesn't wear   them that often-didn't bring them today. Gets migraines.  Patient denies diplopia, flashes/floaters, and pain.    Last edited by Victoria Mello MA on 3/16/2023 10:10 AM.            Assessment /Plan     For exam results, see Encounter Report.    Chronic migraine without aura, intractable, without status migrainosus    Complete eye exam, encounter for    Myopia of both eyes    Subjective visual disturbance      MONITOR. ED PT ON ALL EXAM FINDINGS  RX FINAL SPECS. CAN HOLD ON PAL PRN   OCULAR HEALTH WNL OD, OS   RTC 1 YR//PRN FOR REE/DFE

## 2023-03-17 NOTE — PROGRESS NOTES
[facility]   Physical Therapy Treatment Note     Name: Wendy Munoz  Clinic Number: [id number]    Therapy Diagnosis:   Encounter Diagnoses   Name Primary?    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]   Tear of right acetabular labrum, initial encounter [S73.191A]  Evaluation Date: 11/30/22  Authorization Period Expiration: 2/28/23  Plan of Care Certification Period: 2/28/23 extend 3/31/23  Progress Note Due: 3/22/23  Visit # / Visits authorized: 5/5,  4/ 20 in 2023  FOTO: 1/ 3   PTA Visit #: 0/5     Precautions: Standard    Time In: 805 am  Time Out: 845 am  Total Billable Time: 40 minutes TE 3, MT 1      SUBJECTIVE     Pt reports: good relief and no real issues and 60 min run jog no pain  or issues while running or at work or with sharp turns     She was compliant with home exercise program.  Response to previous treatment: good pain relief but soreness for 36 hrs  Functional change: improved jogging without increased pain.     Pain: 0/10 pre session and 1/10 post session and 3/10 on manual palpation.   Location: right groin  and hip      OBJECTIVE   2/23/23:  AROM: WNL with B hips and lumbar but with mild tightness with extension on R hip  Palpation:  R iliacus moderate and R psoas very mild  tenderness to touch  Grossly 5/5 to 4+/5 overall B hip and lumbar except  Hip extension and hip abduction 4+/5 and decreased overall endurance/tolerance.     SI with normal mobility into hip flexion.       TREATMENT     Total Treatment time (time-based codes) separate from Evaluation: 66 minutes     Wendy received the treatments listed below:        Wendy received therapeutic exercises to develop strength, endurance, posture, and core stabilization for 30 minutes including:  "1 set to educate with HEP.     PPT x 30  Small amplitude trunk rotation x 30     Bridges with add ball 1 x 20 staggered on bosu  Bridges with abd BTB 1 x 20 staggered on bosu  Supine clams BTB 2 x15 in up position reciprocal and with 10# overhead.  Side step with BTB above knee 2 x 20' legs straight with loaded lead arm 5-10# and semisquat with press forward 10#   Prone hip extension or modified aquaman with contralateral shoulder elevation x 15  Supine 90 90  marching for hip flexor eccentric control 2x 15  Deadlift 10-15# 2 x 10  +palloff press in lunge and semisquat with black theratubing 2 x 10  + with green theratubing in lunge 2 x 10  + reverse woodchopper x 10# 2x 10    Leg press on rebounder 3 x 15 100# with progressive lowering into more hip flexion  Rebounder for jumping with 62.5# x 15-20 reps in neutral, laterally, cw and CCW  Jogging:walking 1:1 ratio 75' 5 laps  Step up 20" with use of TRX vs elastic training band as needed 3 x 5-10 reps in various settings:  with contralateral hip flexion, contralateral hip extension  Sled push 2 x 50' with 90#  Human sled with moderate manual resistance for increase dynamic stabilization 2 x 50'  Walking lunges 2 x 30'  Lunge stretch with neutral, side flexion and rotation 3 x 30 secs  Sidelying planks and double leg lift 10x to tolerance  Spider planks x 1 min   +reversal lunges with use of slider with minimal hand support 3 x 10        Hip add/frog stretch 3 x 30 secs  Trunk rotation stretch 3 x 30 secs  SKTC 3 x 30 secs NP  Jose De Jesus test position hip flexor stretch 3 x 30 secs   Lunge stretch 3 steps with rotation or side flexion 2 x 30 secs  +prone quad and rectus femoris stretch with towel roll under knee 2 x 20 secs.     Wendy received the following manual therapy techniques: Manual traction, Myofacial release, and Soft tissue Mobilization were applied to the: iliacus, psoas, QL. Rectus femoris for 10 minutes, including:    Self education with STM " use of small lacrosse ball to iliacus     Short axis traction  Long axis traction  Lateral traction with belt into flexion and IR/ER of hip and combined motion  Prone traction  Prone PA hip glide grade III  Sidelying long axis neutral and with mild rotation           Wendy received hot pack for 0 minutes to hip lumbar.       PATIENT EDUCATION AND HOME EXERCISES     Home Exercises Provided and Patient Education Provided     Education provided:   - HEP    Written Home Exercises Provided: Patient instructed to cont prior HEP. Exercises were reviewed and Wendy was able to demonstrate them prior to the end of the session.  Wendy demonstrated good  understanding of the education provided. See EMR under Patient Instructions for exercises provided during therapy sessions    ASSESSMENT     Pt with good tolerance and return to jogging without pain at achieve set goals for jogging. Pt with minimal residual guarding at L 4 level psoas near origin and may be possible adhesion from prior medical issues and progressive scarring related to that prior medical intervention. Pt continues to progress with lumbar stabilization and lower body and trunk strengthening with return to prior recreational level without pain.       Wendy Is progressing well towards her goals.   Pt prognosis is Good.     Pt will continue to benefit from skilled outpatient physical therapy to address the deficits listed in the problem list box on initial evaluation, provide pt/family education and to maximize pt's level of independence in the home and community environment.     Pt's spiritual, cultural and educational needs considered and pt agreeable to plan of care and goals.     Anticipated barriers to physical therapy: none    Goals:   STG 4 weeks  1.  Pt to be independent with HEP for increased functional mobility and pain control. MET 1/24/23  2.  Pt to increase AROM at 30 degs B hip extension for increased functional mobility and transfers. MET  1/24/23  3.  Pt to decrease pain to less than 0/10 after session for increased functional mobility. MET 3/16/23     Long Term Goals: 12 weeks   1.  Pt to be independent with pain management strategies and verbalize 2 strategies for increased functional mobility and pain control. MET 2/23/23  2.  Pt to increase B hip extension and abduction to 4+/5 to 5/5 for increased functional mobility and transfers.  3.  Pt to decrease pain to less than 0/10 after 1 mile run for increased functional mobility. MET 3/16/23  4.  Pt to increase overall tolerance to 45 min of recreational exercise without increased pain overall for return to recreation exercise. Progressing 3/16/23    PLAN     Cont with POC extend to 3/31/23    Idalia Olvera, PT

## 2023-03-20 LAB
FINAL PATHOLOGIC DIAGNOSIS: NORMAL
GROSS: NORMAL
Lab: NORMAL
MICROSCOPIC EXAM: NORMAL

## 2023-03-21 RX ORDER — TIZANIDINE 2 MG/1
2-4 TABLET ORAL NIGHTLY
Qty: 60 TABLET | Refills: 3 | Status: SHIPPED | OUTPATIENT
Start: 2023-03-21 | End: 2023-07-25 | Stop reason: SDUPTHER

## 2023-03-22 ENCOUNTER — TELEPHONE (OUTPATIENT)
Dept: DERMATOLOGY | Facility: CLINIC | Age: 43
End: 2023-03-22
Payer: COMMERCIAL

## 2023-03-22 ENCOUNTER — CLINICAL SUPPORT (OUTPATIENT)
Dept: REHABILITATION | Facility: HOSPITAL | Age: 43
End: 2023-03-22
Attending: STUDENT IN AN ORGANIZED HEALTH CARE EDUCATION/TRAINING PROGRAM
Payer: COMMERCIAL

## 2023-03-22 DIAGNOSIS — R29.898 WEAKNESS OF BOTH HIPS: ICD-10-CM

## 2023-03-22 DIAGNOSIS — M62.81 WEAKNESS OF TRUNK MUSCULATURE: ICD-10-CM

## 2023-03-22 DIAGNOSIS — M25.551 ACUTE RIGHT HIP PAIN: Primary | ICD-10-CM

## 2023-03-22 PROCEDURE — 97110 THERAPEUTIC EXERCISES: CPT | Mod: PO

## 2023-03-22 PROCEDURE — 97140 MANUAL THERAPY 1/> REGIONS: CPT | Mod: PO

## 2023-03-22 NOTE — PROGRESS NOTES
[facility]   Physical Therapy Treatment Note     Name: Wendy Munoz  Clinic Number: [id number]    Therapy Diagnosis:   No diagnosis found.          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  Response to previous treatment: good pain relief but soreness for 36 hrs  Functional change: improved jogging without increased pain.     Pain: 1/10 pre session and 0/10 post session and 3/10 on manual palpation.   Location: right groin  and hip      OBJECTIVE   23:  AROM: WNL with B hips and lumbar but with mild tightness with extension on R hip  Palpation:  R iliacus moderate and R psoas very mild  tenderness to touch  Grossly 5/5 to 4+/5 overall B hip and lumbar except  Hip extension and hip abduction 4+/5 and decreased overall endurance/tolerance.     SI with normal mobility into hip flexion.       TREATMENT     Total Treatment time (time-based codes) separate from Evaluation: 45 minutes     Wendy received the treatments listed below:        Wendy received therapeutic exercises to develop strength, endurance, posture, and core stabilization for 10 minutes includin set to educate with HEP.     PPT x 30  Small amplitude trunk rotation x 30     Bridges with add ball 1 x 20  "staggered on bosu  Bridges with abd BTB 1 x 20 staggered on bosu  Supine clams BTB 2 x15 in up position reciprocal and with 10# overhead.  Side step with BTB above knee 2 x 20' legs straight with loaded lead arm 5-10# and semisquat with press forward 10#   Prone hip extension or modified aquaman with contralateral shoulder elevation x 15  Supine 90 90  marching for hip flexor eccentric control 2x 15  Deadlift 10-15# 2 x 10  +palloff press in lunge and semisquat with black theratubing 2 x 10  + with green theratubing in lunge 2 x 10  + reverse woodchopper x 10# 2x 10    Leg press on rebounder 3 x 15 100# with progressive lowering into more hip flexion  Rebounder for jumping with 62.5# x 15-20 reps in neutral, laterally, cw and CCW  Jogging:walking 1:1 ratio 75' 5 laps  Step up 20" with use of TRX vs elastic training band as needed 3 x 5-10 reps in various settings:  with contralateral hip flexion, contralateral hip extension  Sled push 2 x 50' with 90#  Human sled with moderate manual resistance for increase dynamic stabilization 2 x 50'  Walking lunges 2 x 30'  Lunge stretch with neutral, side flexion and rotation 3 x 30 secs  Sidelying planks and double leg lift 10x to tolerance  Spider planks x 1 min   +reversal lunges with use of slider with minimal hand support 3 x 10        Hip add/frog stretch 3 x 30 secs  Trunk rotation stretch 3 x 30 secs  SKTC 3 x 30 secs NP  Jose De Jesus test position hip flexor stretch 3 x 30 secs   Piriformis stretch 3 x 30 secs  Glut medius stretch 3 x 30 secs  Lunge stretch 3 steps with rotation or side flexion 2 x 30 secs  +prone quad and rectus femoris stretch with towel roll under knee 2 x 20 secs.     Wendy received the following manual therapy techniques: Manual traction, Myofacial release, and Soft tissue Mobilization were applied to the: iliacus, psoas, QL. Rectus femoris for 35 minutes, including:    Self education with STM use of small lacrosse ball to iliacus     Short " axis traction  Long axis traction  Lateral traction with belt into flexion and IR/ER of hip and combined motion  Prone traction  Prone PA hip glide grade III  Sidelying long axis neutral and with mild rotation           Wendy received hot pack for 0 minutes to hip lumbar.       PATIENT EDUCATION AND HOME EXERCISES     Home Exercises Provided and Patient Education Provided     Education provided:   - HEP    Written Home Exercises Provided: Patient instructed to cont prior HEP. Exercises were reviewed and Wendy was able to demonstrate them prior to the end of the session.  Wendy demonstrated good  understanding of the education provided. See EMR under Patient Instructions for exercises provided during therapy sessions    ASSESSMENT     Pt with good tolerance but mild overuse at hip flexors and anxiety with pending ski trip so session was limited to mostly manual to resolve guarding which was easily resolved with session and pain free. Pt with good insight and should tolerate workload with recreational skiing.        Wendy Is progressing well towards her goals.   Pt prognosis is Good.     Pt will continue to benefit from skilled outpatient physical therapy to address the deficits listed in the problem list box on initial evaluation, provide pt/family education and to maximize pt's level of independence in the home and community environment.     Pt's spiritual, cultural and educational needs considered and pt agreeable to plan of care and goals.     Anticipated barriers to physical therapy: none    Goals:   STG 4 weeks  1.  Pt to be independent with HEP for increased functional mobility and pain control. MET 1/24/23  2.  Pt to increase AROM at 30 degs B hip extension for increased functional mobility and transfers. MET 1/24/23  3.  Pt to decrease pain to less than 0/10 after session for increased functional mobility. MET 3/16/23     Long Term Goals: 12 weeks   1.  Pt to be independent with pain management strategies  and verbalize 2 strategies for increased functional mobility and pain control. MET 2/23/23  2.  Pt to increase B hip extension and abduction to 4+/5 to 5/5 for increased functional mobility and transfers.  3.  Pt to decrease pain to less than 0/10 after 1 mile run for increased functional mobility. MET 3/16/23  4.  Pt to increase overall tolerance to 45 min of recreational exercise without increased pain overall for return to recreation exercise. Progressing 3/22/23    PLAN     Cont with POC extend to 3/31/23    Idalia Olvera, PT

## 2023-03-23 NOTE — TELEPHONE ENCOUNTER
Final Pathologic Diagnosis 1. Skin, left upper arm, upper anterior, shave biopsy:   - BASAL CELL CARCINOMA, SUPERFICIAL TYPE.   - THE TUMOR EXTENDS TO THE PERIPHERAL BIOPSY MARGINS.   This lesion is skin cancer. You will be contacted regarding treatment.   2. 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   - THE LESION FOCALLY EXTENDS TO THE DEEP BIOPSY MARGIN AND A PERIPHERAL   BIOPSY MARGIN.      I called pt and discussed bx results and tx options.  Decided on ED&C for the superficial BCC and E&S for the atypical nevus. Will schedule.

## 2023-04-01 ENCOUNTER — PATIENT MESSAGE (OUTPATIENT)
Dept: DERMATOLOGY | Facility: CLINIC | Age: 43
End: 2023-04-01
Payer: COMMERCIAL

## 2023-04-21 ENCOUNTER — PROCEDURE VISIT (OUTPATIENT)
Dept: DERMATOLOGY | Facility: CLINIC | Age: 43
End: 2023-04-21

## 2023-04-21 DIAGNOSIS — L98.8 RHYTIDES: Primary | ICD-10-CM

## 2023-04-21 PROCEDURE — 99499 UNLISTED E&M SERVICE: CPT | Mod: S$GLB,,, | Performed by: DERMATOLOGY

## 2023-04-21 PROCEDURE — 99499 NO LOS: ICD-10-PCS | Mod: S$GLB,,, | Performed by: DERMATOLOGY

## 2023-04-21 NOTE — PROGRESS NOTES
Subjective:      Patient ID:  Wendy Munoz is a 43 y.o. female who presents for   Chief Complaint   Patient presents with    Botulinum Toxin Injection     HPI  Pt here today for botox. Has had botox for migraines in the past. Last done in 2/2022.  Not pregnant or breastfeeding. No neuromuscular diseases.    Review of Systems   Constitutional:  Negative for fever and chills.   Skin:  Negative for itching and rash.     Objective:   Physical Exam   Constitutional: She appears well-developed and well-nourished. No distress.   Neurological: She is alert and oriented to person, place, and time. She is not disoriented.   Psychiatric: She has a normal mood and affect.   Skin:   Areas Examined (abnormalities noted in diagram):   Head / Face Inspection Performed          Diagram Legend     Erythematous scaling macule/papule c/w actinic keratosis       Vascular papule c/w angioma      Pigmented verrucoid papule/plaque c/w seborrheic keratosis      Yellow umbilicated papule c/w sebaceous hyperplasia      Irregularly shaped tan macule c/w lentigo     1-2 mm smooth white papules consistent with Milia      Movable subcutaneous cyst with punctum c/w epidermal inclusion cyst      Subcutaneous movable cyst c/w pilar cyst      Firm pink to brown papule c/w dermatofibroma      Pedunculated fleshy papule(s) c/w skin tag(s)      Evenly pigmented macule c/w junctional nevus     Mildly variegated pigmented, slightly irregular-bordered macule c/w mildly atypical nevus      Flesh colored to evenly pigmented papule c/w intradermal nevus       Pink pearly papule/plaque c/w basal cell carcinoma      Erythematous hyperkeratotic cursted plaque c/w SCC      Surgical scar with no sign of skin cancer recurrence      Open and closed comedones      Inflammatory papules and pustules      Verrucoid papule consistent consistent with wart     Erythematous eczematous patches and plaques     Dystrophic onycholytic nail with subungual debris c/w  onychomycosis     Umbilicated papule    Erythematous-base heme-crusted tan verrucoid plaque consistent with inflamed seborrheic keratosis     Erythematous Silvery Scaling Plaque c/w Psoriasis     See annotation      Assessment / Plan:        Rhytides  -     onabotulinumtoxinA (cosmetic) injection 25 Units  Discussed benefits and risks of Botox injections, including weakness/paralysis of muscles, asymmetry, eyebrow/lid drooping, pain, bruising, swelling, and rare risk of systemic botulism.   Pt agreed to proceed with treatment. 25 units total injected today (5 units total across forehead, 3-5-5-5-2 in glabellar region). Tolerated well with no complications. Pt instructed not to massage treated areas and that she should avoid exercise today.  Botox dilution: 2:1  Lot #: [id number]  Exp date: 09/2025    RTC 2 wks if touch up desired

## 2023-05-01 ENCOUNTER — PATIENT MESSAGE (OUTPATIENT)
Dept: HEMATOLOGY/ONCOLOGY | Facility: CLINIC | Age: 43
End: 2023-05-01
Payer: COMMERCIAL

## 2023-05-05 ENCOUNTER — PROCEDURE VISIT (OUTPATIENT)
Dept: DERMATOLOGY | Facility: CLINIC | Age: 43
End: 2023-05-05
Payer: COMMERCIAL

## 2023-05-05 DIAGNOSIS — C44.91 SUPERFICIAL BASAL CELL CARCINOMA: ICD-10-CM

## 2023-05-05 DIAGNOSIS — D22.9 ATYPICAL NEVUS: Primary | ICD-10-CM

## 2023-05-05 PROCEDURE — 12032 PR LAYR CLOS WND TRUNK,ARM,LEG 2.6-7.5 CM: ICD-10-PCS | Mod: 51,XS,S$GLB, | Performed by: DERMATOLOGY

## 2023-05-05 PROCEDURE — 11402 PR EXC SKIN BENIG 1.1-2 CM TRUNK,ARM,LEG: ICD-10-PCS | Mod: S$GLB,,, | Performed by: DERMATOLOGY

## 2023-05-05 PROCEDURE — 88305 TISSUE EXAM BY PATHOLOGIST: CPT | Mod: 26,,, | Performed by: PATHOLOGY

## 2023-05-05 PROCEDURE — 88305 TISSUE EXAM BY PATHOLOGIST: ICD-10-PCS | Mod: 26,,, | Performed by: PATHOLOGY

## 2023-05-05 PROCEDURE — 99499 UNLISTED E&M SERVICE: CPT | Mod: S$GLB,,, | Performed by: DERMATOLOGY

## 2023-05-05 PROCEDURE — 99499 NO LOS: ICD-10-PCS | Mod: S$GLB,,, | Performed by: DERMATOLOGY

## 2023-05-05 PROCEDURE — 17262 PR DESTR MALIG TRUNK,EXTREM 1.1-2 CM: ICD-10-PCS | Mod: S$GLB,,, | Performed by: DERMATOLOGY

## 2023-05-05 PROCEDURE — 11402 EXC TR-EXT B9+MARG 1.1-2 CM: CPT | Mod: S$GLB,,, | Performed by: DERMATOLOGY

## 2023-05-05 PROCEDURE — 88305 TISSUE EXAM BY PATHOLOGIST: CPT | Performed by: PATHOLOGY

## 2023-05-05 PROCEDURE — 17262 DSTRJ MAL LES T/A/L 1.1-2.0: CPT | Mod: S$GLB,,, | Performed by: DERMATOLOGY

## 2023-05-05 PROCEDURE — 12032 INTMD RPR S/A/T/EXT 2.6-7.5: CPT | Mod: 51,XS,S$GLB, | Performed by: DERMATOLOGY

## 2023-05-06 NOTE — PATIENT INSTRUCTIONS

## 2023-05-06 NOTE — PROGRESS NOTES
Pt here today for tx of the followin. Skin, left upper arm, upper anterior, shave biopsy:   - BASAL CELL CARCINOMA, SUPERFICIAL TYPE.   - THE TUMOR EXTENDS TO THE PERIPHERAL BIOPSY MARGINS.     2. 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   - THE LESION FOCALLY EXTENDS TO THE DEEP BIOPSY MARGIN AND A PERIPHERAL   BIOPSY MARGIN.     PROCEDURE #1: ED&C of superficial BCC (6x5mm)    Electrodessication and Curettage Procedure note:    Verbal consent obtained. Lesional tissue marked and prepped with alcohol. Lesion anesthetized with 1% lidocaine with epinephrine. Curettage and Desiccation x 3 cycles to base. Aluminum chloride for hemostasis. Lesion size after primary curettage: 1.4 cm    Area bandaged and wound care explained.    F/u 3 months      PROCEDURE #2: Elliptical excision with intermediate layered repair in order to decrease dead space and decrease tension.    ANESTHETIC: 6 cc 1% Xylocaine with Epinephrine 1:100,000, buffered    SURGEON: Grisel Gilliam M.D.    ASSISTANTS: Dr. Dakin (resident)    PREOPERATIVE DIAGNOSIS:  Moderately Atypical Nevus    POSTOPERATIVE DIAGNOSIS:  Same as preoperative diagnosis    PATHOLOGIC DIAGNOSIS: Pending    LOCATION: L upper arm, lower posterior    INITIAL LESION SIZE: 0.5 cm    EXCISED DIAMETER: 1.1 cm    PREPARATION: The diagnosis, procedure, alternatives, benefits and risks, including but not limited to: infection, bleeding/bruising, drug reactions, pain, scar or cosmetic defect, local sensation disturbances, wound dehiscence (separation of wound edges after sutures removed) and/or recurrence of present condition were explained to the patient. The patient elected to proceed.  Patient's identity was verified using 2 patient identifiers and the side and site was verified.  Time out period with surgeon, assistant and patient in surgical suite was  taken.    PROCEDURE: The location noted above was prepped, draped, and anesthetized in the usual sterile fashion per  Grisel Gilliam . Lesional tissue was carefully marked with at least  3  mm margins of clinically normal skin in all directions. A fusiform elliptical excision was done with #15 blade carried down completely through the dermis into the deep subcutaneous tissues to the level of the non-muscle fascia, and dissection was carried out in that plane.  Electrocoagulation was used to obtain hemostasis. Blood loss was minimal. The wound was then approximated in a layered fashion with subcutaneous and intradermal sutures of 4.0 Monocryl, approximately 4 in number, and the wound was then superficially closed with simple interrupted sutures of 4.0 Prolene.    The patient tolerated the procedure well.    The area was cleaned and dressed appropriately and the patient was given wound care instructions, as well as an appointment for follow-up evaluation.    LENGTH OF REPAIR: 3.3 cm

## 2023-05-16 ENCOUNTER — OFFICE VISIT (OUTPATIENT)
Dept: NEUROLOGY | Facility: CLINIC | Age: 43
End: 2023-05-16
Payer: COMMERCIAL

## 2023-05-16 VITALS
WEIGHT: 150 LBS | BODY MASS INDEX: 22.22 KG/M2 | DIASTOLIC BLOOD PRESSURE: 81 MMHG | HEIGHT: 69 IN | SYSTOLIC BLOOD PRESSURE: 122 MMHG | HEART RATE: 66 BPM

## 2023-05-16 DIAGNOSIS — M79.18 CERVICAL MYOFASCIAL PAIN SYNDROME: ICD-10-CM

## 2023-05-16 DIAGNOSIS — G25.0 ESSENTIAL TREMOR: ICD-10-CM

## 2023-05-16 DIAGNOSIS — G43.719 CHRONIC MIGRAINE WITHOUT AURA, INTRACTABLE, WITHOUT STATUS MIGRAINOSUS: Primary | ICD-10-CM

## 2023-05-16 LAB
FINAL PATHOLOGIC DIAGNOSIS: NORMAL
GROSS: NORMAL
Lab: NORMAL
MICROSCOPIC EXAM: NORMAL

## 2023-05-16 PROCEDURE — 1160F PR REVIEW ALL MEDS BY PRESCRIBER/CLIN PHARMACIST DOCUMENTED: ICD-10-PCS | Mod: CPTII,S$GLB,, | Performed by: PSYCHIATRY & NEUROLOGY

## 2023-05-16 PROCEDURE — 3074F PR MOST RECENT SYSTOLIC BLOOD PRESSURE < 130 MM HG: ICD-10-PCS | Mod: CPTII,S$GLB,, | Performed by: PSYCHIATRY & NEUROLOGY

## 2023-05-16 PROCEDURE — 1159F MED LIST DOCD IN RCRD: CPT | Mod: CPTII,S$GLB,, | Performed by: PSYCHIATRY & NEUROLOGY

## 2023-05-16 PROCEDURE — 99999 PR PBB SHADOW E&M-EST. PATIENT-LVL III: CPT | Mod: PBBFAC,,, | Performed by: PSYCHIATRY & NEUROLOGY

## 2023-05-16 PROCEDURE — 3079F DIAST BP 80-89 MM HG: CPT | Mod: CPTII,S$GLB,, | Performed by: PSYCHIATRY & NEUROLOGY

## 2023-05-16 PROCEDURE — 3074F SYST BP LT 130 MM HG: CPT | Mod: CPTII,S$GLB,, | Performed by: PSYCHIATRY & NEUROLOGY

## 2023-05-16 PROCEDURE — 3008F BODY MASS INDEX DOCD: CPT | Mod: CPTII,S$GLB,, | Performed by: PSYCHIATRY & NEUROLOGY

## 2023-05-16 PROCEDURE — 1160F RVW MEDS BY RX/DR IN RCRD: CPT | Mod: CPTII,S$GLB,, | Performed by: PSYCHIATRY & NEUROLOGY

## 2023-05-16 PROCEDURE — 99214 OFFICE O/P EST MOD 30 MIN: CPT | Mod: S$GLB,,, | Performed by: PSYCHIATRY & NEUROLOGY

## 2023-05-16 PROCEDURE — 3008F PR BODY MASS INDEX (BMI) DOCUMENTED: ICD-10-PCS | Mod: CPTII,S$GLB,, | Performed by: PSYCHIATRY & NEUROLOGY

## 2023-05-16 PROCEDURE — 99999 PR PBB SHADOW E&M-EST. PATIENT-LVL III: ICD-10-PCS | Mod: PBBFAC,,, | Performed by: PSYCHIATRY & NEUROLOGY

## 2023-05-16 PROCEDURE — 3079F PR MOST RECENT DIASTOLIC BLOOD PRESSURE 80-89 MM HG: ICD-10-PCS | Mod: CPTII,S$GLB,, | Performed by: PSYCHIATRY & NEUROLOGY

## 2023-05-16 PROCEDURE — 1159F PR MEDICATION LIST DOCUMENTED IN MEDICAL RECORD: ICD-10-PCS | Mod: CPTII,S$GLB,, | Performed by: PSYCHIATRY & NEUROLOGY

## 2023-05-16 PROCEDURE — 99214 PR OFFICE/OUTPT VISIT, EST, LEVL IV, 30-39 MIN: ICD-10-PCS | Mod: S$GLB,,, | Performed by: PSYCHIATRY & NEUROLOGY

## 2023-05-16 RX ORDER — TOPIRAMATE 50 MG/1
50 TABLET, FILM COATED ORAL 2 TIMES DAILY
Qty: 60 TABLET | Refills: 5 | Status: SHIPPED | OUTPATIENT
Start: 2023-05-16 | End: 2023-06-14 | Stop reason: SINTOL

## 2023-05-16 RX ORDER — RIMEGEPANT SULFATE 75 MG/75MG
75 TABLET, ORALLY DISINTEGRATING ORAL ONCE AS NEEDED
Qty: 8 TABLET | Refills: 2 | Status: SHIPPED | OUTPATIENT
Start: 2023-05-16 | End: 2023-07-17 | Stop reason: SDUPTHER

## 2023-05-16 RX ORDER — RIMEGEPANT SULFATE 75 MG/75MG
75 TABLET, ORALLY DISINTEGRATING ORAL ONCE AS NEEDED
Qty: 8 TABLET | Refills: 2 | Status: SHIPPED | OUTPATIENT
Start: 2023-05-16 | End: 2023-05-16

## 2023-05-16 NOTE — PROGRESS NOTES
Neurology Clinic Follow-Up Note    Impression:  Chronic migraine w/ w/out visual aura with cervical myofascial component and prior analgesic overuse (Excedrin)  Cervical myofascial pain: improved on tizanidine  Minimal postural tremor: subjective progression    Plan:  Continue tizanidine 4mg qhs; increase to 2mg/4mg if needed  Nurtec as an alternative to triptans  Excedrin prn; Limit abortives to <= 3 days/week  Topiramate 50mg/d x 3 days then bid.  This may help with tremor as well as migraine.  Cervical stretching reviewed  MyChart update as needed  RTC 3 months      Problem List Items Addressed This Visit          1 - High    Chronic migraine without aura, intractable, without status migrainosus - Primary    Relevant Medications    topiramate (TOPAMAX) 50 MG tablet    rimegepant (NURTEC) 75 mg odt       2     Cervical myofascial pain syndrome    Essential tremor     Patient returns for follow-up of above.   Tizanidine and PT helped with neck although still tightness.  Imitrex/Maxalt not satisfactory.  Excedrin most helpful but she is taking up to 4x/week.   Taking tizanidine 4mg qhs  Cervical PT was helpful        Running history:  CC: migraine    HPI: 42 y/o WF referred for evaluation of migraine.  She has a long-standing history of episodic headache.  There is associated, chronic neck pain with episodic exacerbations/tightness.  Headaches are often associated with R shoulder/neck pain.  Headache is typically R sided but can be left side, lasting 4hrs to 3 days.  There is associated throbbing, + nausea, + photo/phonophobia.  Occ visual aura  Her father has migraine.  She had been treating with frequent Excedrin (nearly qod) but developed a peptic ulcer a month ago and has significantly reduced Excedrin intake.     Abortives   Effective: Excedrin, Fioricet (partially)   Ineffective/not tolerated:  Imitrex, Maxalt  Prophylactics   Effective: Tizanidine   Ineffective/not tolerated: baclofen (sedating)   Uncertain:  "Botox, magnesium    Freq:  12/30 and 5/30  (prior 15/30 and 5/30)    Past Medical History:   Diagnosis Date    Acute right hip pain 11/30/2022    Asthma 2001    Exercise induced    Basal cell carcinoma     Dysmenorrhea 2016    History of COVID-19 08/2021    Metatarsalgia of right foot     Migraine     PUD (peptic ulcer disease)     Sesamoiditis of right foot        No outpatient medications have been marked as taking for the 5/16/23 encounter (Office Visit) with Jose Melendez MD.     Current Facility-Administered Medications for the 5/16/23 encounter (Office Visit) with Jose Melendez MD   Medication Dose Route Frequency Provider Last Rate Last Admin    albuterol inhaler 2 puff  2 puff Inhalation Q20 Min PRN Yasir Callahan MD           /81   Pulse 66   Ht 5' 9" (1.753 m)   Wt 68 kg (150 lb)   BMI 22.15 kg/m²   Well-developed, well nourished. Decreased lateral flexion.  Awake, alert and oriented.  Language is normal.  EOMF without nystagmus, VFF, facial movements intact.  No UE drift.  Min postural tremor BUEs. Gait is steady.    Jose Melendez MD     "

## 2023-05-17 ENCOUNTER — TELEPHONE (OUTPATIENT)
Dept: PHARMACY | Facility: CLINIC | Age: 43
End: 2023-05-17
Payer: COMMERCIAL

## 2023-05-26 ENCOUNTER — OFFICE VISIT (OUTPATIENT)
Dept: PRIMARY CARE CLINIC | Facility: CLINIC | Age: 43
End: 2023-05-26
Payer: COMMERCIAL

## 2023-05-26 VITALS
OXYGEN SATURATION: 99 % | SYSTOLIC BLOOD PRESSURE: 92 MMHG | TEMPERATURE: 98 F | HEART RATE: 71 BPM | HEIGHT: 69 IN | BODY MASS INDEX: 23.09 KG/M2 | WEIGHT: 155.88 LBS | DIASTOLIC BLOOD PRESSURE: 80 MMHG

## 2023-05-26 DIAGNOSIS — J02.9 SORE THROAT: Primary | ICD-10-CM

## 2023-05-26 LAB
CTP QC/QA: YES
S PYO RRNA THROAT QL PROBE: NEGATIVE

## 2023-05-26 PROCEDURE — 3079F DIAST BP 80-89 MM HG: CPT | Mod: CPTII,S$GLB,, | Performed by: STUDENT IN AN ORGANIZED HEALTH CARE EDUCATION/TRAINING PROGRAM

## 2023-05-26 PROCEDURE — 3074F SYST BP LT 130 MM HG: CPT | Mod: CPTII,S$GLB,, | Performed by: STUDENT IN AN ORGANIZED HEALTH CARE EDUCATION/TRAINING PROGRAM

## 2023-05-26 PROCEDURE — 3079F PR MOST RECENT DIASTOLIC BLOOD PRESSURE 80-89 MM HG: ICD-10-PCS | Mod: CPTII,S$GLB,, | Performed by: STUDENT IN AN ORGANIZED HEALTH CARE EDUCATION/TRAINING PROGRAM

## 2023-05-26 PROCEDURE — 87880 STREP A ASSAY W/OPTIC: CPT | Mod: QW,S$GLB,, | Performed by: STUDENT IN AN ORGANIZED HEALTH CARE EDUCATION/TRAINING PROGRAM

## 2023-05-26 PROCEDURE — 99999 PR PBB SHADOW E&M-EST. PATIENT-LVL III: CPT | Mod: PBBFAC,,, | Performed by: STUDENT IN AN ORGANIZED HEALTH CARE EDUCATION/TRAINING PROGRAM

## 2023-05-26 PROCEDURE — 3008F BODY MASS INDEX DOCD: CPT | Mod: CPTII,S$GLB,, | Performed by: STUDENT IN AN ORGANIZED HEALTH CARE EDUCATION/TRAINING PROGRAM

## 2023-05-26 PROCEDURE — 87880 POCT RAPID STREP A: ICD-10-PCS | Mod: QW,S$GLB,, | Performed by: STUDENT IN AN ORGANIZED HEALTH CARE EDUCATION/TRAINING PROGRAM

## 2023-05-26 PROCEDURE — 99213 OFFICE O/P EST LOW 20 MIN: CPT | Mod: S$GLB,,, | Performed by: STUDENT IN AN ORGANIZED HEALTH CARE EDUCATION/TRAINING PROGRAM

## 2023-05-26 PROCEDURE — 1159F PR MEDICATION LIST DOCUMENTED IN MEDICAL RECORD: ICD-10-PCS | Mod: CPTII,S$GLB,, | Performed by: STUDENT IN AN ORGANIZED HEALTH CARE EDUCATION/TRAINING PROGRAM

## 2023-05-26 PROCEDURE — 99999 PR PBB SHADOW E&M-EST. PATIENT-LVL III: ICD-10-PCS | Mod: PBBFAC,,, | Performed by: STUDENT IN AN ORGANIZED HEALTH CARE EDUCATION/TRAINING PROGRAM

## 2023-05-26 PROCEDURE — 1159F MED LIST DOCD IN RCRD: CPT | Mod: CPTII,S$GLB,, | Performed by: STUDENT IN AN ORGANIZED HEALTH CARE EDUCATION/TRAINING PROGRAM

## 2023-05-26 PROCEDURE — 3074F PR MOST RECENT SYSTOLIC BLOOD PRESSURE < 130 MM HG: ICD-10-PCS | Mod: CPTII,S$GLB,, | Performed by: STUDENT IN AN ORGANIZED HEALTH CARE EDUCATION/TRAINING PROGRAM

## 2023-05-26 PROCEDURE — 3008F PR BODY MASS INDEX (BMI) DOCUMENTED: ICD-10-PCS | Mod: CPTII,S$GLB,, | Performed by: STUDENT IN AN ORGANIZED HEALTH CARE EDUCATION/TRAINING PROGRAM

## 2023-05-26 PROCEDURE — 99213 PR OFFICE/OUTPT VISIT, EST, LEVL III, 20-29 MIN: ICD-10-PCS | Mod: S$GLB,,, | Performed by: STUDENT IN AN ORGANIZED HEALTH CARE EDUCATION/TRAINING PROGRAM

## 2023-05-26 NOTE — PROGRESS NOTES
Primary Care  Return/Acute Office Visit - In Person  5/26/2023  Raghu Ndhlovu      HPI    Patient is a 43 y.o.   Wendy Munoz  has a past medical history of Acute right hip pain (11/30/2022), Asthma (2001), Basal cell carcinoma, Dysmenorrhea (2016), History of COVID-19 (08/2021), Metatarsalgia of right foot, Migraine, PUD (peptic ulcer disease), and Sesamoiditis of right foot.    Patient presents with   Chief Complaint   Patient presents with    Sore Throat     1 week, hurts to swallow, swollen lymph nodes     Patient reports one week history of cervical lymphadenopathy   Symptoms associated with sore throat with dysphagia and nausea   She reports starting Topamax for migraines about one week ago    Social History     Social History Narrative    Not on file     Wendy Munoz family history includes Breast cancer (age of onset: 50) in her mother; Cancer in her father; Hyperlipidemia in her father; Hypertension in her father and mother; Prostate cancer in her father.    Active Medications:  Review of patient's allergies indicates:   Allergen Reactions    Latex, natural rubber      Rash/itching     Current Outpatient Medications   Medication Instructions    albuterol (PROAIR HFA) 90 mcg/actuation inhaler 2 puffs, Inhalation, Every 6 hours PRN, Rescue    fish oil-omega-3 fatty acids 300-1,000 mg capsule Oral, Daily    magnesium 250 mg Tab Oral, Once    multivitamin (THERAGRAN) per tablet 1 tablet, Oral, Daily    rizatriptan (MAXALT) 10 mg, Oral, As needed (PRN)    tiZANidine (ZANAFLEX) 2-4 mg, Oral, Nightly    topiramate (TOPAMAX) 50 mg, Oral, 2 times daily    tretinoin (RETIN-A) 0.025 % cream Apply small amount to entire face every night at bedtime. Wash off every morning and apply sunscreen.       Review of Systems   All other systems reviewed and are negative.    Vitals:    05/26/23 0950   BP: 92/80   BP Location: Right arm   Pulse: 71   Temp: 97.9 °F (36.6 °C)   SpO2: 99%   Weight: 70.7 kg (155 lb  "13.8 oz)   Height: 5' 9" (1.753 m)       Physical Exam  Vitals reviewed.   Constitutional:       General: She is not in acute distress.  HENT:      Mouth/Throat:      Pharynx: Oropharynx is clear. No pharyngeal swelling, oropharyngeal exudate or posterior oropharyngeal erythema.   Cardiovascular:      Rate and Rhythm: Normal rate and regular rhythm.      Pulses: Normal pulses.      Heart sounds: Normal heart sounds.   Pulmonary:      Effort: Pulmonary effort is normal.      Breath sounds: Normal breath sounds.   Neurological:      Mental Status: She is oriented to person, place, and time.        Assessment and Plan     URI symptoms  Strep negative   Likely self limiting viral illness v adverse effect Topamax   Patient non toxic appearing and all vitals within normal range             Orders Placed This Visit  Orders Placed This Encounter   Procedures    POCT Rapid Strep A           Upcoming Scheduled Appointments and Follow Up:    Future Appointments   Date Time Provider Department Center   6/28/2023  1:00 PM Ofelia Arango MD [facility]   7/5/2023 10:45 AM [id number]   7/5/2023  1:00 PM Tru Russo NP [facility] [id number] [facility]       Follow Up [facility] (with who? when?): No follow-ups on file.      Extended Emergency Contact Information  Primary Emergency Contact: CHERELLE WATT  Mobile Phone: [phone]  Relation: Spouse   needed? No      Raghu Silva MD   Attending Physician  Primary Care  5/26/2023 - 10:19 AM    I spent a total of 16 minutes on the day of the visit.This includes face to face time and non-face to face time preparing to see the patient (eg, review of tests), obtaining and/or reviewing separately obtained history, documenting clinical information in the electronic or other health record, independently interpreting results and communicating results to the patient/family/caregiver, or care coordinator.      "

## 2023-06-11 ENCOUNTER — PATIENT MESSAGE (OUTPATIENT)
Dept: NEUROLOGY | Facility: CLINIC | Age: 43
End: 2023-06-11
Payer: COMMERCIAL

## 2023-06-14 RX ORDER — ZONISAMIDE 100 MG/1
100-200 CAPSULE ORAL DAILY
Qty: 60 CAPSULE | Refills: 11 | Status: SHIPPED | OUTPATIENT
Start: 2023-06-14 | End: 2023-06-14

## 2023-06-14 RX ORDER — ZONISAMIDE 100 MG/1
100-200 CAPSULE ORAL DAILY
Qty: 60 CAPSULE | Refills: 11 | Status: SHIPPED | OUTPATIENT
Start: 2023-06-14 | End: 2024-06-13
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
## 2023-06-28 ENCOUNTER — OFFICE VISIT (OUTPATIENT)
Dept: PRIMARY CARE CLINIC | Facility: CLINIC | Age: 43
End: 2023-06-28
Payer: COMMERCIAL

## 2023-06-28 VITALS
WEIGHT: 155.44 LBS | OXYGEN SATURATION: 99 % | RESPIRATION RATE: 16 BRPM | DIASTOLIC BLOOD PRESSURE: 80 MMHG | HEIGHT: 69 IN | SYSTOLIC BLOOD PRESSURE: 126 MMHG | BODY MASS INDEX: 23.02 KG/M2 | HEART RATE: 55 BPM

## 2023-06-28 DIAGNOSIS — Z13.220 SCREENING FOR LIPOID DISORDERS: ICD-10-CM

## 2023-06-28 DIAGNOSIS — Z00.00 NORMAL PHYSICAL EXAM, ROUTINE: Primary | ICD-10-CM

## 2023-06-28 DIAGNOSIS — R10.31 RIGHT LOWER QUADRANT PAIN: ICD-10-CM

## 2023-06-28 DIAGNOSIS — Z11.3 SCREEN FOR STD (SEXUALLY TRANSMITTED DISEASE): ICD-10-CM

## 2023-06-28 DIAGNOSIS — G43.719 CHRONIC MIGRAINE WITHOUT AURA, INTRACTABLE, WITHOUT STATUS MIGRAINOSUS: ICD-10-CM

## 2023-06-28 PROCEDURE — 99396 PREV VISIT EST AGE 40-64: CPT | Mod: S$GLB,,, | Performed by: INTERNAL MEDICINE

## 2023-06-28 PROCEDURE — 3008F PR BODY MASS INDEX (BMI) DOCUMENTED: ICD-10-PCS | Mod: CPTII,S$GLB,, | Performed by: INTERNAL MEDICINE

## 2023-06-28 PROCEDURE — 1160F RVW MEDS BY RX/DR IN RCRD: CPT | Mod: CPTII,S$GLB,, | Performed by: INTERNAL MEDICINE

## 2023-06-28 PROCEDURE — 3074F PR MOST RECENT SYSTOLIC BLOOD PRESSURE < 130 MM HG: ICD-10-PCS | Mod: CPTII,S$GLB,, | Performed by: INTERNAL MEDICINE

## 2023-06-28 PROCEDURE — 1159F MED LIST DOCD IN RCRD: CPT | Mod: CPTII,S$GLB,, | Performed by: INTERNAL MEDICINE

## 2023-06-28 PROCEDURE — 1160F PR REVIEW ALL MEDS BY PRESCRIBER/CLIN PHARMACIST DOCUMENTED: ICD-10-PCS | Mod: CPTII,S$GLB,, | Performed by: INTERNAL MEDICINE

## 2023-06-28 PROCEDURE — 3074F SYST BP LT 130 MM HG: CPT | Mod: CPTII,S$GLB,, | Performed by: INTERNAL MEDICINE

## 2023-06-28 PROCEDURE — 99396 PR PREVENTIVE VISIT,EST,40-64: ICD-10-PCS | Mod: S$GLB,,, | Performed by: INTERNAL MEDICINE

## 2023-06-28 PROCEDURE — 3008F BODY MASS INDEX DOCD: CPT | Mod: CPTII,S$GLB,, | Performed by: INTERNAL MEDICINE

## 2023-06-28 PROCEDURE — 99999 PR PBB SHADOW E&M-EST. PATIENT-LVL V: ICD-10-PCS | Mod: PBBFAC,,, | Performed by: INTERNAL MEDICINE

## 2023-06-28 PROCEDURE — 3079F DIAST BP 80-89 MM HG: CPT | Mod: CPTII,S$GLB,, | Performed by: INTERNAL MEDICINE

## 2023-06-28 PROCEDURE — 1159F PR MEDICATION LIST DOCUMENTED IN MEDICAL RECORD: ICD-10-PCS | Mod: CPTII,S$GLB,, | Performed by: INTERNAL MEDICINE

## 2023-06-28 PROCEDURE — 3079F PR MOST RECENT DIASTOLIC BLOOD PRESSURE 80-89 MM HG: ICD-10-PCS | Mod: CPTII,S$GLB,, | Performed by: INTERNAL MEDICINE

## 2023-06-28 PROCEDURE — 99999 PR PBB SHADOW E&M-EST. PATIENT-LVL V: CPT | Mod: PBBFAC,,, | Performed by: INTERNAL MEDICINE

## 2023-07-05 ENCOUNTER — OFFICE VISIT (OUTPATIENT)
Dept: HEMATOLOGY/ONCOLOGY | Facility: CLINIC | Age: 43
End: 2023-07-05
Payer: COMMERCIAL

## 2023-07-05 ENCOUNTER — HOSPITAL ENCOUNTER (OUTPATIENT)
Dept: RADIOLOGY | Facility: HOSPITAL | Age: 43
Discharge: HOME | End: 2023-07-05
Attending: NURSE PRACTITIONER
Payer: COMMERCIAL

## 2023-07-05 VITALS
BODY MASS INDEX: 22.66 KG/M2 | TEMPERATURE: 98 F | SYSTOLIC BLOOD PRESSURE: 107 MMHG | DIASTOLIC BLOOD PRESSURE: 65 MMHG | OXYGEN SATURATION: 100 % | HEART RATE: 72 BPM | HEIGHT: 69 IN | RESPIRATION RATE: 18 BRPM | WEIGHT: 153 LBS

## 2023-07-05 DIAGNOSIS — Z91.89 AT HIGH RISK FOR BREAST CANCER: Primary | ICD-10-CM

## 2023-07-05 DIAGNOSIS — Z80.3 FAMILY HISTORY OF BREAST CANCER: ICD-10-CM

## 2023-07-05 DIAGNOSIS — R92.30 DENSE BREAST TISSUE ON MAMMOGRAM: ICD-10-CM

## 2023-07-05 DIAGNOSIS — Z91.89 AT HIGH RISK FOR BREAST CANCER: ICD-10-CM

## 2023-07-05 DIAGNOSIS — Z12.31 ENCOUNTER FOR SCREENING MAMMOGRAM FOR BREAST CANCER: ICD-10-CM

## 2023-07-05 PROCEDURE — 1159F PR MEDICATION LIST DOCUMENTED IN MEDICAL RECORD: ICD-10-PCS | Mod: CPTII,S$GLB,, | Performed by: NURSE PRACTITIONER

## 2023-07-05 PROCEDURE — 77049 MRI BREAST C-+ W/CAD BI: CPT | Mod: TC

## 2023-07-05 PROCEDURE — 77049 MRI BREAST C-+ W/CAD BI: CPT | Mod: 26,,, | Performed by: RADIOLOGY

## 2023-07-05 PROCEDURE — 99999 PR PBB SHADOW E&M-EST. PATIENT-LVL IV: ICD-10-PCS | Mod: PBBFAC,,, | Performed by: NURSE PRACTITIONER

## 2023-07-05 PROCEDURE — 77049 MRI BREAST W/WO CONTRAST, W/CAD, BILATERAL: ICD-10-PCS | Mod: 26,,, | Performed by: RADIOLOGY

## 2023-07-05 PROCEDURE — 99214 OFFICE O/P EST MOD 30 MIN: CPT | Mod: S$GLB,,, | Performed by: NURSE PRACTITIONER

## 2023-07-05 PROCEDURE — 3074F PR MOST RECENT SYSTOLIC BLOOD PRESSURE < 130 MM HG: ICD-10-PCS | Mod: CPTII,S$GLB,, | Performed by: NURSE PRACTITIONER

## 2023-07-05 PROCEDURE — 99999 PR PBB SHADOW E&M-EST. PATIENT-LVL IV: CPT | Mod: PBBFAC,,, | Performed by: NURSE PRACTITIONER

## 2023-07-05 PROCEDURE — 3078F DIAST BP <80 MM HG: CPT | Mod: CPTII,S$GLB,, | Performed by: NURSE PRACTITIONER

## 2023-07-05 PROCEDURE — 3008F BODY MASS INDEX DOCD: CPT | Mod: CPTII,S$GLB,, | Performed by: NURSE PRACTITIONER

## 2023-07-05 PROCEDURE — A9577 INJ MULTIHANCE: HCPCS | Performed by: NURSE PRACTITIONER

## 2023-07-05 PROCEDURE — 25500020 PHARM REV CODE 255: Performed by: NURSE PRACTITIONER

## 2023-07-05 PROCEDURE — 3078F PR MOST RECENT DIASTOLIC BLOOD PRESSURE < 80 MM HG: ICD-10-PCS | Mod: CPTII,S$GLB,, | Performed by: NURSE PRACTITIONER

## 2023-07-05 PROCEDURE — 1159F MED LIST DOCD IN RCRD: CPT | Mod: CPTII,S$GLB,, | Performed by: NURSE PRACTITIONER

## 2023-07-05 PROCEDURE — 99214 PR OFFICE/OUTPT VISIT, EST, LEVL IV, 30-39 MIN: ICD-10-PCS | Mod: S$GLB,,, | Performed by: NURSE PRACTITIONER

## 2023-07-05 PROCEDURE — 3008F PR BODY MASS INDEX (BMI) DOCUMENTED: ICD-10-PCS | Mod: CPTII,S$GLB,, | Performed by: NURSE PRACTITIONER

## 2023-07-05 PROCEDURE — 3074F SYST BP LT 130 MM HG: CPT | Mod: CPTII,S$GLB,, | Performed by: NURSE PRACTITIONER

## 2023-07-05 RX ADMIN — GADOBENATE DIMEGLUMINE 16 ML: 529 INJECTION, SOLUTION INTRAVENOUS at 11:07

## 2023-07-05 NOTE — PROGRESS NOTES
CC:   Increased lifetime risk of breast cancer    HPI:   Wendy Munoz presents for follow up for increased risk of breast cancer.   Mom had genetic testing and was negative for any mutations.       Today, Feels good and no complaints.   No breast concerns.  Stays active.   MRI reports extremely dense breast    MRI breast today- pending.     2023 MMG:   Impression:   No mammographic evidence of malignancy.     BI-RADS Category 1: Negative     Recommendation:  Routine screening mammogram in 1 year is recommended.     Your estimated lifetime risk of breast cancer (to age 85) based on Tyrer-Cuzick risk assessment model is 23.28 %.  According to the American Cancer Society, patients with a lifetime breast cancer risk of 20% or higher might benefit from supplemental screening tests. ??            High Risk Breast cancer specific history:  - Age: 43 y.o.   - Height:  5'9  - Weight: 154 lbs >153 lbs  - Breast density per BI-RADS:  c-heterogeneously dense> d- extremely dense  - Age at menarche:  14 yo  - Number of pregnancies: ; age of first live birth: 32  - History of breast feeding: Yes - 19 months with 1st child; 2 years with 2nd child; 3 years with last child.    - Age at menopause, if applicable:  N/A   -Uterus and ovaries intact: Yes  - HRT: No  - Genetic testing: No  - Personal history of cancer: basal cell  - Previous chest radiation exposure between ages 10-30 years old: No  - Personal history of breast biopsy: No  - Ashkenazi Zoroastrianism Inheritance: No  - Family history of cancer:    Mom- breast cancer dx 54, currently 70- brca negative  Few woman in family.   Dad- prostate cancer.    Social History:  Tobacco use:  no  Alcohol use:  social  Exercise regimen: 2 times a week- running, yoga  Employment: OB here at [employer]         Past Medical   Past Medical History:   Diagnosis Date    Acute right hip pain 2022    Asthma 2001    Exercise induced    Basal cell carcinoma     Dysmenorrhea 2016     History of COVID-19 08/2021    Metatarsalgia of right foot     Migraine     PUD (peptic ulcer disease)     Sesamoiditis of right foot      Patient Active Problem List   Diagnosis    Migraine headache    Increased risk of breast cancer    Dense breast tissue on mammogram    Family history of breast cancer    Cervical myofascial pain syndrome    Chronic migraine without aura, intractable, without status migrainosus    Decreased ROM of neck    Sesamoiditis of right foot    Acute right hip pain    Weakness of both hips    Weakness of trunk musculature    Essential tremor    Screen for STD (sexually transmitted disease)    Right lower quadrant pain    Normal physical exam, routine     Social History   Social History     Tobacco Use    Smoking status: Never     Passive exposure: Never    Smokeless tobacco: Never   Substance Use Topics    Alcohol use: Yes     Alcohol/week: 1.0 standard drink     Types: 1 Glasses of wine per week     Comment: few times a week    Drug use: Never     Family History  Family History   Problem Relation Age of Onset    Breast cancer Mother 50        No genetic testing    Hypertension Mother     Hypertension Father     Prostate cancer Father     Hyperlipidemia Father     Gout Father     Melanoma Neg Hx     Cataracts Neg Hx     Glaucoma Neg Hx     Macular degeneration Neg Hx      Medications    Current Outpatient Medications:     fish oil-omega-3 fatty acids 300-1,000 mg capsule, Take by mouth once daily., Disp: , Rfl:     magnesium 250 mg Tab, Take by mouth once., Disp: , Rfl:     multivitamin (THERAGRAN) per tablet, Take 1 tablet by mouth once daily., Disp: , Rfl:     tiZANidine (ZANAFLEX) 2 MG tablet, Take 1-2 tablets (2-4 mg total) by mouth every evening., Disp: 60 tablet, Rfl: 3    tretinoin (RETIN-A) 0.025 % cream, Apply small amount to entire face every night at bedtime. Wash off every morning and apply sunscreen., Disp: 45 g, Rfl: 3    zonisamide (ZONEGRAN) 100 MG Cap, Take 1-2 capsules  "(100-200 mg total) by mouth once daily., Disp: 60 capsule, Rfl: 11    Current Facility-Administered Medications:     albuterol inhaler 2 puff, 2 puff, Inhalation, Q20 Min PRN, Yasir Callahan MD  Allergies  Review of patient's allergies indicates:   Allergen Reactions    Latex, natural rubber      Rash/itching    Topamax [topiramate]      Gout       Review of Systems       See above   All other systems reviewed and are negative.    Objective:      Vitals:   Vitals:    07/05/23 1309   BP: 107/65   Pulse: 72   Resp: 18   Temp: 98.1 °F (36.7 °C)   TempSrc: Oral   SpO2: 100%   Weight: 69.4 kg (153 lb)   Height: 5' 9" (1.753 m)       BMI: Body mass index is 22.59 kg/m².   Body surface area is 1.84 meters squared.    Physical Exam  Vitals signs reviewed.   Constitutional:  Normal appearance. NAD.   HENT: Normocephalic.   Eyes: Pupils are equal, round, and reactive to light.   Cardiovascular: regular rate  Pulmonary: Pulmonary effort is normal.   Musculoskeletal: Normal range of motion.   Lymphadenopathy: No cervical adenopathy. No axillary adenopathy.   Skin: Skin is warm and dry.   Neurological:  She is alert and oriented to person, place, and time.   Psychiatric: Mood normal.     Breast Exam: Bilateral breast normal. No masses, no tenderness, no skin or nipple abnormalities.  No lymphadenopathy. +density      Laboratory Data: reviewed most recent   Imaging: reviewed most recent        Assessment:     1. At high risk for breast cancer    2. Dense breast tissue on mammogram    3. Family history of breast cancer            Plan:     Clinically negative  Continue with alternating annual mammogram and annual breast MRI along with semiannual CBEs.  Breast awareness   Lifestyle modifications as previously discussed.   MRI breast - pending results.   MMG due 2/2/2024    RTC 1 year    Route Chart for Scheduling    Med Onc Chart Routing      Follow up with physician    Follow up with [facility] . 1 year with MRI breast   Infusion " scheduling note    Injection scheduling note    Labs    Imaging   MMG due 2/2024   Pharmacy appointment    Other referrals               Patient is in agreement with the proposed treatment plan. All questions were answered to the patient's satisfaction. Pt knows to call clinic for any new or worsening symptoms and if anything is needed before the next clinic visit.      FRED Hein-C  Hematology & Oncology  [address] [place] [address]  ph. [phone]  Fax. [phone]       30 minutes of total time spent on the encounter, which includes face to face time and non-face to face time preparing to see the patient (eg, review of tests), Obtaining and/or reviewing separately obtained history, Documenting clinical information in the electronic or other health record, Independently interpreting results (not separately reported) and communicating results to the patient/family/caregiver, or Care coordination (not separately reported).

## 2023-07-06 ENCOUNTER — HOSPITAL ENCOUNTER (OUTPATIENT)
Dept: RADIOLOGY | Facility: HOSPITAL | Age: 43
Discharge: HOME | End: 2023-07-06
Attending: INTERNAL MEDICINE
Payer: COMMERCIAL

## 2023-07-06 DIAGNOSIS — R10.31 RIGHT LOWER QUADRANT PAIN: ICD-10-CM

## 2023-07-06 PROCEDURE — 76830 TRANSVAGINAL US NON-OB: CPT | Mod: 26,,, | Performed by: STUDENT IN AN ORGANIZED HEALTH CARE EDUCATION/TRAINING PROGRAM

## 2023-07-06 PROCEDURE — 76856 US PELVIS COMP WITH TRANSVAG NON-OB (XPD): ICD-10-PCS | Mod: 26,,, | Performed by: STUDENT IN AN ORGANIZED HEALTH CARE EDUCATION/TRAINING PROGRAM

## 2023-07-06 PROCEDURE — 76830 US PELVIS COMP WITH TRANSVAG NON-OB (XPD): ICD-10-PCS | Mod: 26,,, | Performed by: STUDENT IN AN ORGANIZED HEALTH CARE EDUCATION/TRAINING PROGRAM

## 2023-07-06 PROCEDURE — 76856 US EXAM PELVIC COMPLETE: CPT | Mod: 26,,, | Performed by: STUDENT IN AN ORGANIZED HEALTH CARE EDUCATION/TRAINING PROGRAM

## 2023-07-06 PROCEDURE — 76856 US EXAM PELVIC COMPLETE: CPT | Mod: TC

## 2023-07-06 NOTE — PROGRESS NOTES
I d/w pt - Left adnexal Simple cyst measuring 3.3 cm. Will send a copy to Dr. Oshea. She plans to fu with Ortho due to groin pain.   Dr. LOVE

## 2023-07-17 DIAGNOSIS — G43.719 CHRONIC MIGRAINE WITHOUT AURA, INTRACTABLE, WITHOUT STATUS MIGRAINOSUS: ICD-10-CM

## 2023-07-17 RX ORDER — RIMEGEPANT SULFATE 75 MG/75MG
75 TABLET, ORALLY DISINTEGRATING ORAL ONCE AS NEEDED
Qty: 8 TABLET | Refills: 2 | Status: SHIPPED | OUTPATIENT
Start: 2023-07-17 | End: 2023-08-14 | Stop reason: SDUPTHER

## 2023-07-25 RX ORDER — TIZANIDINE 2 MG/1
2-4 TABLET ORAL NIGHTLY
Qty: 60 TABLET | Refills: 3 | Status: SHIPPED | OUTPATIENT
Start: 2023-07-25 | End: 2023-11-13 | Stop reason: SDUPTHER

## 2023-08-11 ENCOUNTER — PATIENT MESSAGE (OUTPATIENT)
Dept: NEUROLOGY | Facility: CLINIC | Age: 43
End: 2023-08-11
Payer: COMMERCIAL

## 2023-08-14 DIAGNOSIS — G43.719 CHRONIC MIGRAINE WITHOUT AURA, INTRACTABLE, WITHOUT STATUS MIGRAINOSUS: ICD-10-CM

## 2023-08-14 RX ORDER — RIMEGEPANT SULFATE 75 MG/75MG
75 TABLET, ORALLY DISINTEGRATING ORAL ONCE AS NEEDED
Qty: 24 TABLET | Refills: 3 | Status: SHIPPED | OUTPATIENT
Start: 2023-08-14 | End: 2023-09-20 | Stop reason: SDUPTHER

## 2023-09-20 ENCOUNTER — PATIENT MESSAGE (OUTPATIENT)
Dept: NEUROLOGY | Facility: CLINIC | Age: 43
End: 2023-09-20
Payer: COMMERCIAL

## 2023-09-20 DIAGNOSIS — G43.719 CHRONIC MIGRAINE WITHOUT AURA, INTRACTABLE, WITHOUT STATUS MIGRAINOSUS: ICD-10-CM

## 2023-09-20 RX ORDER — RIMEGEPANT SULFATE 75 MG/75MG
75 TABLET, ORALLY DISINTEGRATING ORAL ONCE AS NEEDED
Qty: 24 TABLET | Refills: 3 | Status: SHIPPED | OUTPATIENT
Start: 2023-09-20 | End: 2023-09-21

## 2023-10-02 PROBLEM — Z00.00 NORMAL PHYSICAL EXAM, ROUTINE: Status: RESOLVED | Noted: 2023-06-28 | Resolved: 2023-10-02

## 2023-10-30 ENCOUNTER — OFFICE VISIT (OUTPATIENT)
Dept: OBSTETRICS AND GYNECOLOGY | Facility: CLINIC | Age: 43
End: 2023-10-30
Payer: COMMERCIAL

## 2023-10-30 ENCOUNTER — LAB VISIT (OUTPATIENT)
Dept: LAB | Facility: OTHER | Age: 43
End: 2023-10-30
Attending: INTERNAL MEDICINE
Payer: COMMERCIAL

## 2023-10-30 VITALS
WEIGHT: 149.94 LBS | DIASTOLIC BLOOD PRESSURE: 74 MMHG | BODY MASS INDEX: 22.14 KG/M2 | SYSTOLIC BLOOD PRESSURE: 118 MMHG

## 2023-10-30 DIAGNOSIS — Z01.419 ENCOUNTER FOR ANNUAL ROUTINE GYNECOLOGICAL EXAMINATION: Primary | ICD-10-CM

## 2023-10-30 DIAGNOSIS — Z12.4 ENCOUNTER FOR PAPANICOLAOU SMEAR FOR CERVICAL CANCER SCREENING: ICD-10-CM

## 2023-10-30 DIAGNOSIS — Z13.220 SCREENING FOR LIPOID DISORDERS: ICD-10-CM

## 2023-10-30 DIAGNOSIS — Z91.89 AT RISK FOR BREAST CANCER: ICD-10-CM

## 2023-10-30 DIAGNOSIS — N92.0 MENORRHAGIA WITH REGULAR CYCLE: ICD-10-CM

## 2023-10-30 DIAGNOSIS — Z00.00 NORMAL PHYSICAL EXAM, ROUTINE: ICD-10-CM

## 2023-10-30 DIAGNOSIS — Z11.3 SCREEN FOR STD (SEXUALLY TRANSMITTED DISEASE): ICD-10-CM

## 2023-10-30 DIAGNOSIS — Z11.51 ENCOUNTER FOR SCREENING FOR HUMAN PAPILLOMAVIRUS (HPV): ICD-10-CM

## 2023-10-30 LAB
ALBUMIN SERPL BCP-MCNC: 4.5 G/DL (ref 3.5–5.2)
ALP SERPL-CCNC: 53 U/L (ref 55–135)
ALT SERPL W/O P-5'-P-CCNC: 9 U/L (ref 10–44)
ANION GAP SERPL CALC-SCNC: 9 MMOL/L (ref 8–16)
AST SERPL-CCNC: 14 U/L (ref 10–40)
BASOPHILS # BLD AUTO: 0.06 K/UL (ref 0–0.2)
BASOPHILS NFR BLD: 0.8 % (ref 0–1.9)
BILIRUB SERPL-MCNC: 0.4 MG/DL (ref 0.1–1)
BUN SERPL-MCNC: 9 MG/DL (ref 6–20)
CALCIUM SERPL-MCNC: 9.1 MG/DL (ref 8.7–10.5)
CHLORIDE SERPL-SCNC: 106 MMOL/L (ref 95–110)
CHOLEST SERPL-MCNC: 164 MG/DL (ref 120–199)
CHOLEST/HDLC SERPL: 2.4 {RATIO} (ref 2–5)
CO2 SERPL-SCNC: 24 MMOL/L (ref 23–29)
CREAT SERPL-MCNC: 0.9 MG/DL (ref 0.5–1.4)
DIFFERENTIAL METHOD: ABNORMAL
EOSINOPHIL # BLD AUTO: 0.1 K/UL (ref 0–0.5)
EOSINOPHIL NFR BLD: 0.7 % (ref 0–8)
ERYTHROCYTE [DISTWIDTH] IN BLOOD BY AUTOMATED COUNT: 11.7 % (ref 11.5–14.5)
EST. GFR  (NO RACE VARIABLE): >60 ML/MIN/1.73 M^2
GLUCOSE SERPL-MCNC: 87 MG/DL (ref 70–110)
HCT VFR BLD AUTO: 36.7 % (ref 37–48.5)
HDLC SERPL-MCNC: 67 MG/DL (ref 40–75)
HDLC SERPL: 40.9 % (ref 20–50)
HGB BLD-MCNC: 12.7 G/DL (ref 12–16)
IMM GRANULOCYTES # BLD AUTO: 0.02 K/UL (ref 0–0.04)
IMM GRANULOCYTES NFR BLD AUTO: 0.3 % (ref 0–0.5)
LDLC SERPL CALC-MCNC: 85 MG/DL (ref 63–159)
LYMPHOCYTES # BLD AUTO: 2.6 K/UL (ref 1–4.8)
LYMPHOCYTES NFR BLD: 35.1 % (ref 18–48)
MCH RBC QN AUTO: 30 PG (ref 27–31)
MCHC RBC AUTO-ENTMCNC: 34.6 G/DL (ref 32–36)
MCV RBC AUTO: 87 FL (ref 82–98)
MONOCYTES # BLD AUTO: 0.5 K/UL (ref 0.3–1)
MONOCYTES NFR BLD: 6.1 % (ref 4–15)
NEUTROPHILS # BLD AUTO: 4.2 K/UL (ref 1.8–7.7)
NEUTROPHILS NFR BLD: 57 % (ref 38–73)
NONHDLC SERPL-MCNC: 97 MG/DL
NRBC BLD-RTO: 0 /100 WBC
PLATELET # BLD AUTO: 267 K/UL (ref 150–450)
PMV BLD AUTO: 9.1 FL (ref 9.2–12.9)
POTASSIUM SERPL-SCNC: 3.7 MMOL/L (ref 3.5–5.1)
PROT SERPL-MCNC: 7.6 G/DL (ref 6–8.4)
RBC # BLD AUTO: 4.24 M/UL (ref 4–5.4)
RPR SER QL: NORMAL
SODIUM SERPL-SCNC: 139 MMOL/L (ref 136–145)
TRIGL SERPL-MCNC: 60 MG/DL (ref 30–150)
TSH SERPL DL<=0.005 MIU/L-ACNC: 0.95 UIU/ML (ref 0.4–4)
WBC # BLD AUTO: 7.35 K/UL (ref 3.9–12.7)

## 2023-10-30 PROCEDURE — 1160F RVW MEDS BY RX/DR IN RCRD: CPT | Mod: CPTII,S$GLB,, | Performed by: OBSTETRICS & GYNECOLOGY

## 2023-10-30 PROCEDURE — 3008F BODY MASS INDEX DOCD: CPT | Mod: CPTII,S$GLB,, | Performed by: OBSTETRICS & GYNECOLOGY

## 2023-10-30 PROCEDURE — 87624 HPV HI-RISK TYP POOLED RSLT: CPT | Performed by: OBSTETRICS & GYNECOLOGY

## 2023-10-30 PROCEDURE — 3074F PR MOST RECENT SYSTOLIC BLOOD PRESSURE < 130 MM HG: ICD-10-PCS | Mod: CPTII,S$GLB,, | Performed by: OBSTETRICS & GYNECOLOGY

## 2023-10-30 PROCEDURE — 99999 PR PBB SHADOW E&M-EST. PATIENT-LVL III: ICD-10-PCS | Mod: PBBFAC,,, | Performed by: OBSTETRICS & GYNECOLOGY

## 2023-10-30 PROCEDURE — 99396 PREV VISIT EST AGE 40-64: CPT | Mod: S$GLB,,, | Performed by: OBSTETRICS & GYNECOLOGY

## 2023-10-30 PROCEDURE — 80061 LIPID PANEL: CPT | Performed by: INTERNAL MEDICINE

## 2023-10-30 PROCEDURE — 99999 PR PBB SHADOW E&M-EST. PATIENT-LVL III: CPT | Mod: PBBFAC,,, | Performed by: OBSTETRICS & GYNECOLOGY

## 2023-10-30 PROCEDURE — 1159F PR MEDICATION LIST DOCUMENTED IN MEDICAL RECORD: ICD-10-PCS | Mod: CPTII,S$GLB,, | Performed by: OBSTETRICS & GYNECOLOGY

## 2023-10-30 PROCEDURE — 3078F PR MOST RECENT DIASTOLIC BLOOD PRESSURE < 80 MM HG: ICD-10-PCS | Mod: CPTII,S$GLB,, | Performed by: OBSTETRICS & GYNECOLOGY

## 2023-10-30 PROCEDURE — 3078F DIAST BP <80 MM HG: CPT | Mod: CPTII,S$GLB,, | Performed by: OBSTETRICS & GYNECOLOGY

## 2023-10-30 PROCEDURE — 85025 COMPLETE CBC W/AUTO DIFF WBC: CPT | Performed by: INTERNAL MEDICINE

## 2023-10-30 PROCEDURE — 3074F SYST BP LT 130 MM HG: CPT | Mod: CPTII,S$GLB,, | Performed by: OBSTETRICS & GYNECOLOGY

## 2023-10-30 PROCEDURE — 88175 CYTOPATH C/V AUTO FLUID REDO: CPT | Performed by: OBSTETRICS & GYNECOLOGY

## 2023-10-30 PROCEDURE — 1159F MED LIST DOCD IN RCRD: CPT | Mod: CPTII,S$GLB,, | Performed by: OBSTETRICS & GYNECOLOGY

## 2023-10-30 PROCEDURE — 1160F PR REVIEW ALL MEDS BY PRESCRIBER/CLIN PHARMACIST DOCUMENTED: ICD-10-PCS | Mod: CPTII,S$GLB,, | Performed by: OBSTETRICS & GYNECOLOGY

## 2023-10-30 PROCEDURE — 3008F PR BODY MASS INDEX (BMI) DOCUMENTED: ICD-10-PCS | Mod: CPTII,S$GLB,, | Performed by: OBSTETRICS & GYNECOLOGY

## 2023-10-30 PROCEDURE — 80053 COMPREHEN METABOLIC PANEL: CPT | Performed by: INTERNAL MEDICINE

## 2023-10-30 PROCEDURE — 36415 COLL VENOUS BLD VENIPUNCTURE: CPT | Performed by: INTERNAL MEDICINE

## 2023-10-30 PROCEDURE — 84443 ASSAY THYROID STIM HORMONE: CPT | Performed by: INTERNAL MEDICINE

## 2023-10-30 PROCEDURE — 99396 PR PREVENTIVE VISIT,EST,40-64: ICD-10-PCS | Mod: S$GLB,,, | Performed by: OBSTETRICS & GYNECOLOGY

## 2023-10-30 PROCEDURE — 86592 SYPHILIS TEST NON-TREP QUAL: CPT | Performed by: INTERNAL MEDICINE

## 2023-10-30 RX ORDER — TRANEXAMIC ACID 650 MG/1
1300 TABLET ORAL 3 TIMES DAILY
Qty: 30 TABLET | Refills: 0 | Status: SHIPPED | OUTPATIENT
Start: 2023-10-30 | End: 2023-11-04

## 2023-11-03 LAB
FINAL PATHOLOGIC DIAGNOSIS: NORMAL
Lab: NORMAL

## 2023-11-04 NOTE — PROGRESS NOTES
Chief Complaint: Well Woman Exam     HPI:      Wendy Munoz is a 43 y.o.  who presents today for well woman exam.  LMP: Patient's last menstrual period was 10/16/2023.  No issues, problems, or complaints. Specifically, patient denies abnormal vaginal bleeding, discharge, pelvic pain, urinary problems, or changes in appetite. Ms. Munoz is currently sexually active with a single male partner. She declines STD screening today. Patient has regular monthly menses. Patient's last menstrual period was 10/16/2023. She is currently using vasectomy for contraception. Menopausal complaints: none. Cycle flow controlled with Lysteda and refill requested.    Previous Pap: no abnormalities/HPV negative  10/13/2020  Previous Mammogram: BiRads: 1 T-C Score: 23.28% Results for orders placed during the hospital encounter of 23    Mammo Digital Screening Bilat w/ Chidi    Narrative  Result:  Mammo Digital Screening Bilat w/ Chidi    History:  Patient is 43 y.o. and is seen for a screening mammogram.      Films Compared:  Prior images (if available) were compared.    Findings:  This procedure was performed using tomosynthesis.  Computer-aided detection was utilized in the interpretation of this examination.    The breasts are heterogeneously dense, which may obscure small masses. There is no evidence of suspicious masses, microcalcifications or architectural distortion.    Impression  No mammographic evidence of malignancy.    BI-RADS Category 1: Negative    Recommendation:  Routine screening mammogram in 1 year is recommended.    Your estimated lifetime risk of breast cancer (to age 85) based on Tyrer-Cuzick risk assessment model is 23.28 %.  According to the American Cancer Society, patients with a lifetime breast cancer risk of 20% or higher might benefit from supplemental screening tests. ??    Followed at [facility] with last MRI breast planned soon.      Gardasil: Completed     OB History          4    Para    3    Term   3            AB   1    Living   3         SAB   1    IAB        Ectopic        Multiple        Live Births   3           Obstetric Comments   Menarche at 13  H/O abnormal pap (2004)- colpo normal  No other STDs               GYN History  Age of Menarche:13  Age at first pregnancy:    Age at first live birth:32  Number of months breastfeeding:    Age at Menopause:    Comments:         ROS:     GENERAL: Denies unintentional weight gain or weight loss. Feeling well overall.   SKIN: Denies rash or lesions.   HEENT: Denies headaches, or vision changes.   CARDIOVASCULAR: Denies palpitations or chest pain.   RESPIRATORY: Denies shortness of breath or dyspnea on exertion.  BREASTS: Denies pain, lumps, or nipple discharge.   ABDOMEN: Denies abdominal pain, constipation, diarrhea, nausea, vomiting, change in appetite.  URINARY: Denies frequency, dysuria, hematuria.  NEUROLOGIC: Denies syncope or weakness.   PSYCHIATRIC: Denies depression, anxiety or mood swings.    Physical Exam:      PHYSICAL EXAM:  /74 (BP Location: Right arm, Patient Position: Sitting, BP Method: Medium (Manual))   Wt 68 kg (149 lb 14.6 oz)   LMP 10/16/2023   BMI 22.14 kg/m²   Body mass index is 22.14 kg/m².     APPEARANCE: Well nourished, well developed, in no acute distress.  PSYCH: Appropriate mood and affect.  SKIN: No acne or hirsutism  NECK: Neck symmetric without masses or thyromegaly  NODES: No inguinal, axillary, or supraclavicular lymph node enlargement  ABDOMEN: Soft.  No tenderness or masses.    CARDIOVASCULAR: No edema of peripheral extremities  BREASTS: Symmetrical, no skin changes or visible lesions.  No palpable masses or nipple discharge bilaterally.  PELVIC: Normal external genitalia without lesions.  Normal hair distribution.  Adequate perineal body, normal urethral meatus.  Vagina moist and well rugated without lesions or discharge.  Cervix pink, without lesions, discharge or tenderness.  No significant  cystocele or rectocele.  Bimanual exam shows uterus to be normal size, regular, mobile and nontender.  Adnexa without masses or tenderness.      Assessment/Plan:     Encounter for annual routine gynecological examination  Normal exam today. Pap smear with HPV done today. Gardasil completed. Continue yearly MMG/MRI due to elevated risk of breast cancer. Refill of lysteda for management of heavy menstrual cycles. Other health maintenance up to date per PCP.     Menorrhagia with regular cycle  -     tranexamic acid (LYSTEDA) 650 mg tablet; Take 2 tablets (1,300 mg total) by mouth 3 (three) times daily. for 5 days  Dispense: 30 tablet; Refill: 0    Encounter for Papanicolaou smear for cervical cancer screening  -     Liquid-Based Pap Smear, Screening    Encounter for screening for human papillomavirus (HPV)  -     HPV High Risk Genotypes, PCR    At risk for breast cancer        RTC 1 year    Counseling:     Patient was counseled today on current ASCCP pap guidelines, the recommendation for yearly pelvic exams, healthy diet and exercise routines, breast self awareness and annual mammograms.She is to see her PCP for other health maintenance.     Use of the [facility] Patient Portal discussed and encouraged during today's visit.       Parul Oshea MD      As of April 1, 2021, the Cures Act has been passed nationally. This new law requires that all doctors progress notes, lab results, pathology reports and radiology reports be released IMMEDIATELY to the patient in the patient portal. That means that the results are released to you at the EXACT same time they are released to me. Therefore, with all of the patients that I have I am not able to reply to each patient exactly when the results come in. So there will be a delay from when you see the results to when I see them and have time to come up with a response to send you. Also I only see these results when I am on the computer at work. So if the results come in over the  weekend or after 5 pm of a work day, I will not see them until the next business day. As you can tell, this is a challenge as a physician to give every patient the quick response they hope for and deserve. So please be patient! Thanks for understanding, Dr. Oshea

## 2023-11-10 ENCOUNTER — PATIENT MESSAGE (OUTPATIENT)
Dept: NEUROLOGY | Facility: CLINIC | Age: 43
End: 2023-11-10
Payer: COMMERCIAL

## 2023-11-10 DIAGNOSIS — G43.719 CHRONIC MIGRAINE WITHOUT AURA, INTRACTABLE, WITHOUT STATUS MIGRAINOSUS: Primary | ICD-10-CM

## 2023-11-10 RX ORDER — RIMEGEPANT SULFATE 75 MG/75MG
75 TABLET, ORALLY DISINTEGRATING ORAL
Qty: 8 TABLET | Refills: 11 | Status: SHIPPED | OUTPATIENT
Start: 2023-11-10

## 2023-11-14 RX ORDER — TIZANIDINE 2 MG/1
2-4 TABLET ORAL NIGHTLY
Qty: 60 TABLET | Refills: 3 | Status: SHIPPED | OUTPATIENT
Start: 2023-11-14 | End: 2024-03-14 | Stop reason: SDUPTHER

## 2023-11-16 NOTE — PROGRESS NOTES
I sent pt a my chart message - (Make sure t got message)  I reviewed your  labs.  Your RPR was negative. Your thyroid functions are normal.  Your Cholesterol looked good.  Your kidney function and liver functions looked good.  You are not anemic. No further recommendations at this time.    Dr. LOVE

## 2024-01-23 ENCOUNTER — PATIENT MESSAGE (OUTPATIENT)
Dept: NEUROLOGY | Facility: CLINIC | Age: 44
End: 2024-01-23
Payer: COMMERCIAL

## 2024-01-23 DIAGNOSIS — M54.2 CERVICALGIA: Primary | ICD-10-CM

## 2024-01-23 DIAGNOSIS — R29.2 HYPERREFLEXIA: ICD-10-CM

## 2024-01-23 DIAGNOSIS — M79.18 CERVICAL MYOFASCIAL PAIN SYNDROME: ICD-10-CM

## 2024-02-27 ENCOUNTER — HOSPITAL ENCOUNTER (OUTPATIENT)
Dept: RADIOLOGY | Facility: HOSPITAL | Age: 44
Discharge: HOME | End: 2024-02-27
Attending: NURSE PRACTITIONER
Payer: COMMERCIAL

## 2024-02-27 DIAGNOSIS — Z12.31 ENCOUNTER FOR SCREENING MAMMOGRAM FOR BREAST CANCER: ICD-10-CM

## 2024-02-27 PROCEDURE — 77063 BREAST TOMOSYNTHESIS BI: CPT | Mod: 26,,, | Performed by: RADIOLOGY

## 2024-02-27 PROCEDURE — 77067 SCR MAMMO BI INCL CAD: CPT | Mod: 26,,, | Performed by: RADIOLOGY

## 2024-02-27 PROCEDURE — 77067 SCR MAMMO BI INCL CAD: CPT | Mod: TC

## 2024-03-06 ENCOUNTER — PATIENT MESSAGE (OUTPATIENT)
Dept: NEUROLOGY | Facility: CLINIC | Age: 44
End: 2024-03-06
Payer: COMMERCIAL

## 2024-03-15 RX ORDER — TIZANIDINE 2 MG/1
2-4 TABLET ORAL NIGHTLY
Qty: 60 TABLET | Refills: 3 | Status: SHIPPED | OUTPATIENT
Start: 2024-03-15 | End: 2024-04-09

## 2024-03-27 ENCOUNTER — OFFICE VISIT (OUTPATIENT)
Dept: DERMATOLOGY | Facility: CLINIC | Age: 44
End: 2024-03-27
Payer: COMMERCIAL

## 2024-03-27 DIAGNOSIS — L91.0 HYPERTROPHIC SCAR: ICD-10-CM

## 2024-03-27 DIAGNOSIS — L82.1 SK (SEBORRHEIC KERATOSIS): ICD-10-CM

## 2024-03-27 DIAGNOSIS — Z12.83 SKIN CANCER SCREENING: ICD-10-CM

## 2024-03-27 DIAGNOSIS — Z85.828 HISTORY OF NONMELANOMA SKIN CANCER: ICD-10-CM

## 2024-03-27 DIAGNOSIS — Z85.828 HISTORY OF SKIN CANCER: ICD-10-CM

## 2024-03-27 DIAGNOSIS — D22.9 MULTIPLE BENIGN NEVI: Primary | ICD-10-CM

## 2024-03-27 PROCEDURE — 1159F MED LIST DOCD IN RCRD: CPT | Mod: CPTII,S$GLB,, | Performed by: DERMATOLOGY

## 2024-03-27 PROCEDURE — 11900 INJECT SKIN LESIONS </W 7: CPT | Mod: S$GLB,,, | Performed by: DERMATOLOGY

## 2024-03-27 PROCEDURE — 99213 OFFICE O/P EST LOW 20 MIN: CPT | Mod: 25,S$GLB,, | Performed by: DERMATOLOGY

## 2024-03-27 PROCEDURE — 99999 PR PBB SHADOW E&M-EST. PATIENT-LVL III: CPT | Mod: PBBFAC,,, | Performed by: DERMATOLOGY

## 2024-03-27 PROCEDURE — 1160F RVW MEDS BY RX/DR IN RCRD: CPT | Mod: CPTII,S$GLB,, | Performed by: DERMATOLOGY
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
## 2024-03-27 NOTE — PATIENT INSTRUCTIONS

## 2024-03-29 PROBLEM — Z85.828 HISTORY OF SKIN CANCER: Status: ACTIVE | Noted: 2024-03-29

## 2024-03-29 RX ORDER — TRIAMCINOLONE ACETONIDE 40 MG/ML
20 INJECTION, SUSPENSION INTRA-ARTICULAR; INTRAMUSCULAR
Status: SHIPPED | OUTPATIENT
Start: 2024-03-27

## 2024-04-08 ENCOUNTER — PATIENT MESSAGE (OUTPATIENT)
Dept: NEUROLOGY | Facility: CLINIC | Age: 44
End: 2024-04-08
Payer: COMMERCIAL

## 2024-04-08 ENCOUNTER — HOSPITAL ENCOUNTER (OUTPATIENT)
Dept: RADIOLOGY | Facility: HOSPITAL | Age: 44
Discharge: HOME | End: 2024-04-08
Attending: PSYCHIATRY & NEUROLOGY
Payer: COMMERCIAL

## 2024-04-08 DIAGNOSIS — M79.18 CERVICAL MYOFASCIAL PAIN SYNDROME: Primary | ICD-10-CM

## 2024-04-08 DIAGNOSIS — M54.2 CERVICALGIA: ICD-10-CM

## 2024-04-08 DIAGNOSIS — G43.719 CHRONIC MIGRAINE WITHOUT AURA, INTRACTABLE, WITHOUT STATUS MIGRAINOSUS: ICD-10-CM

## 2024-04-08 DIAGNOSIS — M79.18 CERVICAL MYOFASCIAL PAIN SYNDROME: ICD-10-CM

## 2024-04-08 DIAGNOSIS — R29.2 HYPERREFLEXIA: ICD-10-CM

## 2024-04-08 PROBLEM — M47.812 CERVICAL SPONDYLOSIS: Status: ACTIVE | Noted: 2024-04-08

## 2024-04-08 PROCEDURE — 72141 MRI NECK SPINE W/O DYE: CPT | Mod: TC

## 2024-04-08 PROCEDURE — 72141 MRI NECK SPINE W/O DYE: CPT | Mod: 26,,, | Performed by: RADIOLOGY

## 2024-04-09 RX ORDER — GABAPENTIN 100 MG/1
100 CAPSULE ORAL 3 TIMES DAILY
Qty: 90 CAPSULE | Refills: 11 | Status: SHIPPED | OUTPATIENT
Start: 2024-04-09 | End: 2025-04-09
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
## 2024-04-10 RX ORDER — TIZANIDINE 2 MG/1
2-4 TABLET ORAL 3 TIMES DAILY
Qty: 180 TABLET | Refills: 11 | Status: SHIPPED | OUTPATIENT
Start: 2024-04-10

## 2024-04-19 ENCOUNTER — PATIENT MESSAGE (OUTPATIENT)
Dept: OBSTETRICS AND GYNECOLOGY | Facility: CLINIC | Age: 44
End: 2024-04-19
Payer: COMMERCIAL

## 2024-04-19 DIAGNOSIS — N92.0 MENORRHAGIA WITH REGULAR CYCLE: ICD-10-CM

## 2024-04-19 RX ORDER — TRANEXAMIC ACID 650 MG/1
1300 TABLET ORAL 3 TIMES DAILY
Qty: 30 TABLET | Refills: 6 | Status: SHIPPED | OUTPATIENT
Start: 2024-04-19 | End: 2024-04-28

## 2024-04-27 ENCOUNTER — PATIENT MESSAGE (OUTPATIENT)
Dept: PRIMARY CARE CLINIC | Facility: CLINIC | Age: 44
End: 2024-04-27
Payer: COMMERCIAL

## 2024-04-28 ENCOUNTER — OFFICE VISIT (OUTPATIENT)
Dept: URGENT CARE | Facility: CLINIC | Age: 44
End: 2024-04-28
Payer: COMMERCIAL

## 2024-04-28 VITALS
HEIGHT: 69 IN | BODY MASS INDEX: 22.07 KG/M2 | HEART RATE: 59 BPM | WEIGHT: 149 LBS | SYSTOLIC BLOOD PRESSURE: 127 MMHG | RESPIRATION RATE: 18 BRPM | DIASTOLIC BLOOD PRESSURE: 85 MMHG | TEMPERATURE: 98 F | OXYGEN SATURATION: 99 %

## 2024-04-28 DIAGNOSIS — J02.9 SORE THROAT: Primary | ICD-10-CM

## 2024-04-28 LAB
CTP QC/QA: YES
MOLECULAR STREP A: NEGATIVE

## 2024-04-28 PROCEDURE — 87070 CULTURE OTHR SPECIMN AEROBIC: CPT | Performed by: NURSE PRACTITIONER

## 2024-04-28 PROCEDURE — 87651 STREP A DNA AMP PROBE: CPT | Mod: QW,S$GLB,, | Performed by: NURSE PRACTITIONER

## 2024-04-28 PROCEDURE — 99213 OFFICE O/P EST LOW 20 MIN: CPT | Mod: S$GLB,,, | Performed by: NURSE PRACTITIONER

## 2024-04-28 NOTE — PROGRESS NOTES
"Subjective:      Patient ID: Wendy Munoz is a 44 y.o. female.    Vitals:  height is 5' 9" (1.753 m) and weight is 67.6 kg (149 lb). Her oral temperature is 98.1 °F (36.7 °C). Her blood pressure is 127/85 and her pulse is 59 (abnormal). Her respiration is 18 and oxygen saturation is 99%.     Chief Complaint: Sore Throat    Patient presents c.o. sore throat.Patient has no additional symptoms.Symps began Friday.    Sore Throat   The current episode started in the past 7 days. The problem has been unchanged. The pain is worse on the left side. There has been no fever. The pain is at a severity of 6/10. The pain is moderate. Associated symptoms include trouble swallowing. Pertinent negatives include no abdominal pain, congestion, coughing, diarrhea, drooling, ear discharge, ear pain, headaches, hoarse voice, plugged ear sensation, neck pain, shortness of breath, stridor, swollen glands or vomiting. She has had exposure to strep. She has tried nothing for the symptoms. The treatment provided no relief.       Constitution: Negative for sweating, fatigue and fever.   HENT:  Positive for sore throat and trouble swallowing. Negative for ear pain, ear discharge, drooling and congestion.    Neck: Negative for neck pain.   Cardiovascular: Negative.    Respiratory:  Negative for cough, shortness of breath and stridor.    Gastrointestinal:  Negative for abdominal pain, vomiting and diarrhea.   Neurological:  Negative for headaches.      Objective:     Physical Exam   Constitutional: She does not appear ill. No distress.   HENT:   Head: Normocephalic.   Ears:   Right Ear: Tympanic membrane, external ear and ear canal normal.   Left Ear: Tympanic membrane, external ear and ear canal normal.   Nose: Nose normal. No rhinorrhea or congestion.   Mouth/Throat: Mucous membranes are moist. No oropharyngeal exudate or posterior oropharyngeal erythema. Oropharynx is clear.   Eyes: Pupils are equal, round, and reactive to light. "   Cardiovascular: Normal rate and regular rhythm.   Pulmonary/Chest: Effort normal and breath sounds normal.   Abdominal: Normal appearance.   Neurological: She is alert.   Skin: Skin is warm and dry.       Assessment:     1. Sore throat        Plan:       Sore throat  -     POCT Strep A, Molecular  -     Cancel: Strep A culture, throat    Other orders  -     Throat culture      Results for orders placed or performed in visit on 04/28/24   POCT Strep A, Molecular   Result Value Ref Range    Molecular Strep A, POC Negative Negative     Acceptable Yes      Patient Instructions   Drink warm liquids: Drinking tea with lemon and honey, broth or bouillon may help dry, scratchy throats.  Apply ice: Sucking on ice chips or popsicles may help sore throat pain.  Use a humidifier or vaporizer: Adding moisture to your environment, especially your bedroom when youre sleeping, helps dry throats.  Rest: If your throat is sore from shouting, screaming, singing or even talking a lot, resting your voice may help.  Even more rest: Try to get as much rest as you can, including eight hours of sleep at night.  Avoid irritants: Second-hand smoke, smoking, spicy foods and very hot liquids may irritate your sore throat. If you smoke, please try giving up cigarettes or cigars for a few days.

## 2024-04-30 ENCOUNTER — PATIENT MESSAGE (OUTPATIENT)
Dept: URGENT CARE | Facility: CLINIC | Age: 44
End: 2024-04-30
Payer: COMMERCIAL

## 2024-05-01 LAB — BACTERIA THROAT CULT: NORMAL

## 2024-05-29 ENCOUNTER — TELEPHONE (OUTPATIENT)
Dept: HEMATOLOGY/ONCOLOGY | Facility: CLINIC | Age: 44
End: 2024-05-29
Payer: COMMERCIAL

## 2024-05-29 ENCOUNTER — PATIENT MESSAGE (OUTPATIENT)
Dept: HEMATOLOGY/ONCOLOGY | Facility: CLINIC | Age: 44
End: 2024-05-29
Payer: COMMERCIAL

## 2024-05-29 NOTE — TELEPHONE ENCOUNTER
Called patient regarding portal message. Attempted to schedule mri appt , July slots only available up until July 5th. Informed patient and recall reminder is set and clinic staff will call to schedule appts closer to July when more mri slots are open with follow up. Patient verbalized understanding.

## 2024-05-31 DIAGNOSIS — Z91.89 AT HIGH RISK FOR BREAST CANCER: Primary | ICD-10-CM

## 2024-05-31 DIAGNOSIS — R92.30 DENSE BREAST TISSUE ON MAMMOGRAM, UNSPECIFIED TYPE: ICD-10-CM

## 2024-05-31 DIAGNOSIS — Z80.3 FAMILY HISTORY OF BREAST CANCER: ICD-10-CM

## 2024-06-04 ENCOUNTER — PATIENT MESSAGE (OUTPATIENT)
Dept: HEMATOLOGY/ONCOLOGY | Facility: CLINIC | Age: 44
End: 2024-06-04
Payer: COMMERCIAL

## 2024-07-30 ENCOUNTER — PATIENT MESSAGE (OUTPATIENT)
Dept: HEMATOLOGY/ONCOLOGY | Facility: CLINIC | Age: 44
End: 2024-07-30
Payer: COMMERCIAL

## 2024-08-05 ENCOUNTER — HOSPITAL ENCOUNTER (OUTPATIENT)
Dept: RADIOLOGY | Facility: OTHER | Age: 44
Discharge: HOME | End: 2024-08-05
Attending: NURSE PRACTITIONER
Payer: COMMERCIAL

## 2024-08-05 DIAGNOSIS — R92.30 DENSE BREAST TISSUE ON MAMMOGRAM, UNSPECIFIED TYPE: ICD-10-CM

## 2024-08-05 DIAGNOSIS — Z80.3 FAMILY HISTORY OF BREAST CANCER: ICD-10-CM

## 2024-08-05 DIAGNOSIS — Z91.89 AT HIGH RISK FOR BREAST CANCER: ICD-10-CM

## 2024-08-05 PROCEDURE — 25500020 PHARM REV CODE 255: Performed by: NURSE PRACTITIONER

## 2024-08-05 PROCEDURE — 77049 MRI BREAST C-+ W/CAD BI: CPT | Mod: 26,,, | Performed by: RADIOLOGY

## 2024-08-05 PROCEDURE — A9577 INJ MULTIHANCE: HCPCS | Performed by: NURSE PRACTITIONER

## 2024-08-05 PROCEDURE — 77049 MRI BREAST C-+ W/CAD BI: CPT | Mod: TC

## 2024-08-05 RX ADMIN — GADOBENATE DIMEGLUMINE 13 ML: 529 INJECTION, SOLUTION INTRAVENOUS at 03:08

## 2024-08-08 ENCOUNTER — TELEPHONE (OUTPATIENT)
Dept: HEMATOLOGY/ONCOLOGY | Facility: CLINIC | Age: 44
End: 2024-08-08
Payer: COMMERCIAL

## 2024-08-14 ENCOUNTER — OFFICE VISIT (OUTPATIENT)
Dept: HEMATOLOGY/ONCOLOGY | Facility: CLINIC | Age: 44
End: 2024-08-14
Payer: COMMERCIAL

## 2024-08-14 VITALS
SYSTOLIC BLOOD PRESSURE: 126 MMHG | HEIGHT: 69 IN | BODY MASS INDEX: 23.15 KG/M2 | RESPIRATION RATE: 17 BRPM | WEIGHT: 156.31 LBS | DIASTOLIC BLOOD PRESSURE: 72 MMHG | OXYGEN SATURATION: 100 % | HEART RATE: 70 BPM | TEMPERATURE: 98 F

## 2024-08-14 DIAGNOSIS — Z12.31 ENCOUNTER FOR SCREENING MAMMOGRAM FOR BREAST CANCER: ICD-10-CM

## 2024-08-14 DIAGNOSIS — Z80.3 FAMILY HISTORY OF BREAST CANCER: ICD-10-CM

## 2024-08-14 DIAGNOSIS — R92.343 EXTREMELY DENSE TISSUE OF BOTH BREASTS ON MAMMOGRAPHY: ICD-10-CM

## 2024-08-14 DIAGNOSIS — Z91.89 AT HIGH RISK FOR BREAST CANCER: Primary | ICD-10-CM

## 2024-08-14 PROCEDURE — 1159F MED LIST DOCD IN RCRD: CPT | Mod: CPTII,S$GLB,, | Performed by: NURSE PRACTITIONER

## 2024-08-14 PROCEDURE — 3078F DIAST BP <80 MM HG: CPT | Mod: CPTII,S$GLB,, | Performed by: NURSE PRACTITIONER

## 2024-08-14 PROCEDURE — 99214 OFFICE O/P EST MOD 30 MIN: CPT | Mod: S$GLB,,, | Performed by: NURSE PRACTITIONER

## 2024-08-14 PROCEDURE — 3074F SYST BP LT 130 MM HG: CPT | Mod: CPTII,S$GLB,, | Performed by: NURSE PRACTITIONER

## 2024-08-14 PROCEDURE — 99999 PR PBB SHADOW E&M-EST. PATIENT-LVL IV: CPT | Mod: PBBFAC,,, | Performed by: NURSE PRACTITIONER

## 2024-08-14 PROCEDURE — G2211 COMPLEX E/M VISIT ADD ON: HCPCS | Mod: S$GLB,,, | Performed by: NURSE PRACTITIONER

## 2024-08-14 PROCEDURE — 3008F BODY MASS INDEX DOCD: CPT | Mod: CPTII,S$GLB,, | Performed by: NURSE PRACTITIONER

## 2024-08-14 NOTE — PROGRESS NOTES
CC:   Increased lifetime risk of breast cancer    HPI:   Wendy Munoz presents for follow up for increased risk of breast cancer.   Mom had genetic testing and was negative for any mutations.       Today, Feels good and no complaints.   No breast concerns.  Stays active.   MRI reports extremely dense breast    2024 MRI breast - negative    2024 MMG:   Impression:   No mammographic evidence of malignancy.     BI-RADS Category 1: Negative     Recommendation:  Routine screening mammogram in 1 year is recommended.     Your estimated lifetime risk of breast cancer (to age 85) based on Tyrer-Cuzick risk assessment model is 23.15 %.  According to the American Cancer Society, patients with a lifetime breast cancer risk of 20% or higher might benefit from supplemental screening tests, such as screening breast MRI.        High Risk Breast cancer specific history:  - Age: 44 y.o.   - Height:  5'9  - Weight: 154 lbs >153 lbs >156 lbs  - Breast density per BI-RADS:  c-heterogeneously dense> d- extremely dense  - Age at menarche:  14 yo  - Number of pregnancies: ; age of first live birth: 32  - History of breast feeding: Yes - 19 months with 1st child; 2 years with 2nd child; 3 years with last child.    - Age at menopause, if applicable:  N/A   -Uterus and ovaries intact: Yes  - HRT: No  - Genetic testing: No  - Personal history of cancer: basal cell  - Previous chest radiation exposure between ages 10-30 years old: No  - Personal history of breast biopsy: No  - Ashkenazi Religion Inheritance: No  - Family history of cancer:    Mom- breast cancer dx 54, currently 70- brca negative  Few woman in family.   Dad- prostate cancer.    Social History:  Tobacco use:  no  Alcohol use:  social  Exercise regimen: 2 times a week- running, yoga  Employment: OB here at [employer]         Past Medical   Past Medical History:   Diagnosis Date    Acute right hip pain 2022    Asthma 2001    Exercise induced    Basal cell  carcinoma     Dysmenorrhea 2016    History of COVID-19 08/2021    History of skin cancer 3/29/2024    Metatarsalgia of right foot     Migraine     PUD (peptic ulcer disease)     Sesamoiditis of right foot      Patient Active Problem List   Diagnosis    Migraine headache    Increased risk of breast cancer    Dense breast tissue on mammogram    Family history of breast cancer    Cervical myofascial pain syndrome    Chronic migraine without aura, intractable, without status migrainosus    Decreased ROM of neck    Sesamoiditis of right foot    Acute right hip pain    Weakness of both hips    Weakness of trunk musculature    Essential tremor    Screen for STD (sexually transmitted disease)    Right lower quadrant pain    History of skin cancer    Cervical spondylosis     Social History   Social History     Tobacco Use    Smoking status: Never     Passive exposure: Never    Smokeless tobacco: Never   Substance Use Topics    Alcohol use: Yes     Alcohol/week: 1.0 standard drink of alcohol     Types: 1 Glasses of wine per week     Comment: few times a week    Drug use: Never     Family History  Family History   Problem Relation Name Age of Onset    Breast cancer Mother Evonne Hawkins 50        No genetic testing    Hypertension Mother Evonne Hawkins     Hypertension Father Wilmer Hawkins     Prostate cancer Father Wilmer Hawkins     Hyperlipidemia Father Wilmer Hawkins     Gout Father Wilmer Hawkins     Melanoma Neg Hx      Cataracts Neg Hx      Glaucoma Neg Hx      Macular degeneration Neg Hx       Medications    Current Outpatient Medications:     fish oil-omega-3 fatty acids 300-1,000 mg capsule, Take by mouth once daily., Disp: , Rfl:     gabapentin (NEURONTIN) 100 MG capsule, Take 1 capsule (100 mg total) by mouth 3 (three) times daily., Disp: 90 capsule, Rfl: 11    magnesium 250 mg Tab, Take by mouth once., Disp: , Rfl:     multivitamin (THERAGRAN) per tablet, Take 1 tablet by mouth once daily., Disp: , Rfl:     ondansetron  "(ZOFRAN-ODT) 4 MG TbDL, Take 1 tablet by mouth every 8 (eight) hours for 5 days, Disp: 15 tablet, Rfl: 0    rimegepant (NURTEC) 75 mg odt, Take 1 tablet (75 mg total) by mouth as needed for Migraine. Place ODT tablet on the tongue; alternatively the ODT tablet may be placed under the tongue, Disp: 8 tablet, Rfl: 11    tiZANidine (ZANAFLEX) 2 MG tablet, Take 1-2 tablets (2-4 mg total) by mouth 3 (three) times daily., Disp: 180 tablet, Rfl: 11    tretinoin (RETIN-A) 0.025 % cream, Apply small amount to entire face every night at bedtime. Wash off every morning and apply sunscreen., Disp: 45 g, Rfl: 3    oxyCODONE (ROXICODONE) 5 MG immediate release tablet, Take 1 tablet by mouth every 4 (four) hours as needed for Pain for up to 5 days Max Daily Amount: 30 mg, Disp: 25 tablet, Rfl: 0    zonisamide (ZONEGRAN) 100 MG Cap, Take 1-2 capsules (100-200 mg total) by mouth once daily., Disp: 60 capsule, Rfl: 11    Current Facility-Administered Medications:     albuterol inhaler 2 puff, 2 puff, Inhalation, Q20 Min PRN, Yasir Callahan MD    triamcinolone acetonide injection 20 mg, 20 mg, Intradermal, 1 time in Clinic/HOD, Grisel Gilliam MD  Allergies  Review of patient's allergies indicates:   Allergen Reactions    Latex, natural rubber      Rash/itching    Topamax [topiramate]      Gout       Review of Systems       See above   All other systems reviewed and are negative.    Objective:      Vitals:   Vitals:    08/14/24 1316   BP: 126/72   Pulse: 70   Resp: 17   Temp: 98 °F (36.7 °C)   TempSrc: Oral   SpO2: 100%   Weight: 70.9 kg (156 lb 4.9 oz)   Height: 5' 9" (1.753 m)       BMI: Body mass index is 23.08 kg/m².   Body surface area is 1.86 meters squared.    Physical Exam  Vitals signs reviewed.   Constitutional:  Normal appearance. NAD.   HENT: Normocephalic.   Eyes: Pupils are equal, round, and reactive to light.   Cardiovascular: regular rate  Pulmonary: Pulmonary effort is normal.   Musculoskeletal: Normal range of " motion.   Lymphadenopathy: No cervical adenopathy. No axillary adenopathy.   Skin: Skin is warm and dry.   Neurological:  She is alert and oriented to person, place, and time.   Psychiatric: Mood normal.     Breast Exam: Bilateral breast normal. No masses, no tenderness, no skin or nipple abnormalities.  No lymphadenopathy. +density      Laboratory Data: reviewed most recent   Imaging: reviewed most recent        Assessment:     1. At high risk for breast cancer    2. Extremely dense tissue of both breasts on mammography    3. Family history of breast cancer              Plan:     Clinically negative  Continue with alternating annual mammogram and annual breast MRI along with semiannual CBEs.  Breast awareness   Lifestyle modifications as previously discussed.   MRI breast in 1 year on same da  MMG due 2/2025  We briefly discussed chemoprevention with tamoxifen. Continue to opt out for now. She may want to consider at 52yo    RTC 1 year    Route Chart for Scheduling    Med Onc Chart Routing      Follow up with physician    Follow up with [facility] . See me 1 year with a MRI breast same day. i can see before MRI or after.   Infusion scheduling note    Injection scheduling note    Labs    Imaging   MM 2/2025;   Pharmacy appointment    Other referrals                       Patient is in agreement with the proposed treatment plan. All questions were answered to the patient's satisfaction. Pt knows to call clinic for any new or worsening symptoms and if anything is needed before the next clinic visit.    Tru Russo, MSN, APRN, FNP-C  Nurse Practitioner to Dr. Sarah Phillips  [facility]  [facility]  Hematology & Medical Oncology  [address] [place] [id number]  ph. [phone] ext [phone]  Fax. [phone]              30 minutes of total time spent on the encounter, which includes face to face time and non-face to face time preparing to see the patient (eg, review of  tests), Obtaining and/or reviewing separately obtained history, Documenting clinical information in the electronic or other health record, Independently interpreting results (not separately reported) and communicating results to the patient/family/caregiver, or Care coordination (not separately reported).

## 2024-10-09 ENCOUNTER — IMMUNIZATION (OUTPATIENT)
Dept: INTERNAL MEDICINE | Facility: CLINIC | Age: 44
End: 2024-10-09
Payer: COMMERCIAL

## 2024-10-09 DIAGNOSIS — Z23 NEED FOR VACCINATION: Primary | ICD-10-CM

## 2024-10-09 PROCEDURE — 90656 IIV3 VACC NO PRSV 0.5 ML IM: CPT | Mod: S$GLB,,, | Performed by: INTERNAL MEDICINE

## 2024-10-09 PROCEDURE — 90471 IMMUNIZATION ADMIN: CPT | Mod: S$GLB,,, | Performed by: INTERNAL MEDICINE

## 2024-10-30 ENCOUNTER — LAB VISIT (OUTPATIENT)
Dept: LAB | Facility: HOSPITAL | Age: 44
End: 2024-10-30
Attending: INTERNAL MEDICINE
Payer: COMMERCIAL

## 2024-10-30 ENCOUNTER — OFFICE VISIT (OUTPATIENT)
Dept: PRIMARY CARE CLINIC | Facility: CLINIC | Age: 44
End: 2024-10-30
Payer: COMMERCIAL

## 2024-10-30 VITALS
HEART RATE: 58 BPM | BODY MASS INDEX: 23.51 KG/M2 | SYSTOLIC BLOOD PRESSURE: 104 MMHG | OXYGEN SATURATION: 98 % | WEIGHT: 158.75 LBS | DIASTOLIC BLOOD PRESSURE: 70 MMHG | TEMPERATURE: 98 F | RESPIRATION RATE: 16 BRPM | HEIGHT: 69 IN

## 2024-10-30 DIAGNOSIS — Z13.220 SCREENING FOR LIPOID DISORDERS: ICD-10-CM

## 2024-10-30 DIAGNOSIS — M54.2 CERVICALGIA: ICD-10-CM

## 2024-10-30 DIAGNOSIS — D64.9 NORMOCYTIC ANEMIA: Primary | ICD-10-CM

## 2024-10-30 DIAGNOSIS — Z00.00 NORMAL PHYSICAL EXAM, ROUTINE: Primary | ICD-10-CM

## 2024-10-30 DIAGNOSIS — Z00.00 NORMAL PHYSICAL EXAM, ROUTINE: ICD-10-CM

## 2024-10-30 DIAGNOSIS — Z11.3 SCREEN FOR STD (SEXUALLY TRANSMITTED DISEASE): ICD-10-CM

## 2024-10-30 DIAGNOSIS — G43.719 CHRONIC MIGRAINE WITHOUT AURA, INTRACTABLE, WITHOUT STATUS MIGRAINOSUS: ICD-10-CM

## 2024-10-30 LAB
ALBUMIN SERPL BCP-MCNC: 4.1 G/DL (ref 3.5–5.2)
ALP SERPL-CCNC: 52 U/L (ref 40–150)
ALT SERPL W/O P-5'-P-CCNC: 17 U/L (ref 10–44)
ANION GAP SERPL CALC-SCNC: 9 MMOL/L (ref 8–16)
AST SERPL-CCNC: 19 U/L (ref 10–40)
BASOPHILS # BLD AUTO: 0.06 K/UL (ref 0–0.2)
BASOPHILS NFR BLD: 1 % (ref 0–1.9)
BILIRUB SERPL-MCNC: 0.4 MG/DL (ref 0.1–1)
BUN SERPL-MCNC: 9 MG/DL (ref 6–20)
CALCIUM SERPL-MCNC: 9 MG/DL (ref 8.7–10.5)
CHLORIDE SERPL-SCNC: 105 MMOL/L (ref 95–110)
CHOLEST SERPL-MCNC: 164 MG/DL (ref 120–199)
CHOLEST/HDLC SERPL: 2.4 {RATIO} (ref 2–5)
CO2 SERPL-SCNC: 23 MMOL/L (ref 23–29)
CREAT SERPL-MCNC: 0.8 MG/DL (ref 0.5–1.4)
DIFFERENTIAL METHOD BLD: ABNORMAL
EOSINOPHIL # BLD AUTO: 0.1 K/UL (ref 0–0.5)
EOSINOPHIL NFR BLD: 1.1 % (ref 0–8)
ERYTHROCYTE [DISTWIDTH] IN BLOOD BY AUTOMATED COUNT: 12.4 % (ref 11.5–14.5)
EST. GFR  (NO RACE VARIABLE): >60 ML/MIN/1.73 M^2
GLUCOSE SERPL-MCNC: 96 MG/DL (ref 70–110)
HAV IGM SERPL QL IA: NORMAL
HBV CORE IGM SERPL QL IA: NORMAL
HBV SURFACE AG SERPL QL IA: NORMAL
HCT VFR BLD AUTO: 35.2 % (ref 37–48.5)
HCV AB SERPL QL IA: NORMAL
HDLC SERPL-MCNC: 67 MG/DL (ref 40–75)
HDLC SERPL: 40.9 % (ref 20–50)
HGB BLD-MCNC: 11.8 G/DL (ref 12–16)
HIV 1+2 AB+HIV1 P24 AG SERPL QL IA: NORMAL
IMM GRANULOCYTES # BLD AUTO: 0.01 K/UL (ref 0–0.04)
IMM GRANULOCYTES NFR BLD AUTO: 0.2 % (ref 0–0.5)
LDLC SERPL CALC-MCNC: 82.8 MG/DL (ref 63–159)
LYMPHOCYTES # BLD AUTO: 2.3 K/UL (ref 1–4.8)
LYMPHOCYTES NFR BLD: 37.2 % (ref 18–48)
MCH RBC QN AUTO: 29.1 PG (ref 27–31)
MCHC RBC AUTO-ENTMCNC: 33.5 G/DL (ref 32–36)
MCV RBC AUTO: 87 FL (ref 82–98)
MONOCYTES # BLD AUTO: 0.4 K/UL (ref 0.3–1)
MONOCYTES NFR BLD: 6.8 % (ref 4–15)
NEUTROPHILS # BLD AUTO: 3.4 K/UL (ref 1.8–7.7)
NEUTROPHILS NFR BLD: 53.7 % (ref 38–73)
NONHDLC SERPL-MCNC: 97 MG/DL
NRBC BLD-RTO: 0 /100 WBC
PLATELET # BLD AUTO: 265 K/UL (ref 150–450)
PMV BLD AUTO: 9.7 FL (ref 9.2–12.9)
POTASSIUM SERPL-SCNC: 3.9 MMOL/L (ref 3.5–5.1)
PROT SERPL-MCNC: 7.1 G/DL (ref 6–8.4)
RBC # BLD AUTO: 4.05 M/UL (ref 4–5.4)
SODIUM SERPL-SCNC: 137 MMOL/L (ref 136–145)
TREPONEMA PALLIDUM IGG+IGM AB [PRESENCE] IN SERUM OR PLASMA BY IMMUNOASSAY: NONREACTIVE
TRIGL SERPL-MCNC: 71 MG/DL (ref 30–150)
TSH SERPL DL<=0.005 MIU/L-ACNC: 0.96 UIU/ML (ref 0.4–4)
WBC # BLD AUTO: 6.29 K/UL (ref 3.9–12.7)

## 2024-10-30 PROCEDURE — 3078F DIAST BP <80 MM HG: CPT | Mod: CPTII,S$GLB,, | Performed by: INTERNAL MEDICINE

## 2024-10-30 PROCEDURE — 1160F RVW MEDS BY RX/DR IN RCRD: CPT | Mod: CPTII,S$GLB,, | Performed by: INTERNAL MEDICINE

## 2024-10-30 PROCEDURE — 87389 HIV-1 AG W/HIV-1&-2 AB AG IA: CPT | Performed by: INTERNAL MEDICINE

## 2024-10-30 PROCEDURE — 36415 COLL VENOUS BLD VENIPUNCTURE: CPT | Mod: PN | Performed by: INTERNAL MEDICINE

## 2024-10-30 PROCEDURE — 99999 PR PBB SHADOW E&M-EST. PATIENT-LVL IV: CPT | Mod: PBBFAC,,, | Performed by: INTERNAL MEDICINE

## 2024-10-30 PROCEDURE — 3074F SYST BP LT 130 MM HG: CPT | Mod: CPTII,S$GLB,, | Performed by: INTERNAL MEDICINE

## 2024-10-30 PROCEDURE — 84443 ASSAY THYROID STIM HORMONE: CPT | Performed by: INTERNAL MEDICINE

## 2024-10-30 PROCEDURE — 80061 LIPID PANEL: CPT | Performed by: INTERNAL MEDICINE

## 2024-10-30 PROCEDURE — 85025 COMPLETE CBC W/AUTO DIFF WBC: CPT | Performed by: INTERNAL MEDICINE

## 2024-10-30 PROCEDURE — 99396 PREV VISIT EST AGE 40-64: CPT | Mod: S$GLB,,, | Performed by: INTERNAL MEDICINE

## 2024-10-30 PROCEDURE — 3008F BODY MASS INDEX DOCD: CPT | Mod: CPTII,S$GLB,, | Performed by: INTERNAL MEDICINE

## 2024-10-30 PROCEDURE — 1159F MED LIST DOCD IN RCRD: CPT | Mod: CPTII,S$GLB,, | Performed by: INTERNAL MEDICINE

## 2024-10-30 PROCEDURE — 86593 SYPHILIS TEST NON-TREP QUANT: CPT | Performed by: INTERNAL MEDICINE

## 2024-10-30 PROCEDURE — 80053 COMPREHEN METABOLIC PANEL: CPT | Performed by: INTERNAL MEDICINE

## 2024-10-30 PROCEDURE — 80074 ACUTE HEPATITIS PANEL: CPT | Performed by: INTERNAL MEDICINE

## 2024-10-31 NOTE — PROGRESS NOTES
I sent pt a my chart message -  I reviewed your labs.  Your thyroid functions are normal.  Your Cholesterol looked good.  Your kidney function and liver functions looked good.  You are slightly anemic. Have you been having heavy cycles or have you recently donated blood?  We could consider checking iron studies. Your treponema Ab's were negative. Your hepatitis and HIV screenings were negative.  No further recommendations at this time.  Ofelia

## 2024-11-01 ENCOUNTER — PATIENT MESSAGE (OUTPATIENT)
Dept: PRIMARY CARE CLINIC | Facility: CLINIC | Age: 44
End: 2024-11-01
Payer: COMMERCIAL

## 2024-11-08 ENCOUNTER — OFFICE VISIT (OUTPATIENT)
Dept: OBSTETRICS AND GYNECOLOGY | Facility: CLINIC | Age: 44
End: 2024-11-08
Payer: COMMERCIAL

## 2024-11-08 VITALS
DIASTOLIC BLOOD PRESSURE: 77 MMHG | BODY MASS INDEX: 23.12 KG/M2 | HEIGHT: 69 IN | SYSTOLIC BLOOD PRESSURE: 118 MMHG | WEIGHT: 156.06 LBS

## 2024-11-08 DIAGNOSIS — F32.81 PMDD (PREMENSTRUAL DYSPHORIC DISORDER): ICD-10-CM

## 2024-11-08 DIAGNOSIS — N39.3 STRESS INCONTINENCE: ICD-10-CM

## 2024-11-08 DIAGNOSIS — N94.6 DYSMENORRHEA: ICD-10-CM

## 2024-11-08 DIAGNOSIS — N83.202 LEFT OVARIAN CYST: ICD-10-CM

## 2024-11-08 DIAGNOSIS — Z01.419 WELL WOMAN EXAM WITH ROUTINE GYNECOLOGICAL EXAM: Primary | ICD-10-CM

## 2024-11-08 DIAGNOSIS — Z11.3 SCREENING FOR STDS (SEXUALLY TRANSMITTED DISEASES): ICD-10-CM

## 2024-11-08 PROCEDURE — 87591 N.GONORRHOEAE DNA AMP PROB: CPT | Performed by: OBSTETRICS & GYNECOLOGY

## 2024-11-08 PROCEDURE — 99999 PR PBB SHADOW E&M-EST. PATIENT-LVL IV: CPT | Mod: PBBFAC,,, | Performed by: OBSTETRICS & GYNECOLOGY

## 2024-11-08 RX ORDER — PAROXETINE HYDROCHLORIDE 20 MG/1
20 TABLET, FILM COATED ORAL EVERY MORNING
Qty: 30 TABLET | Refills: 11 | Status: SHIPPED | OUTPATIENT
Start: 2024-11-08 | End: 2024-11-08

## 2024-11-08 RX ORDER — FLUCONAZOLE 150 MG/1
TABLET ORAL
Qty: 2 TABLET | Refills: 3 | Status: SHIPPED | OUTPATIENT
Start: 2024-11-08

## 2024-11-08 RX ORDER — TRANEXAMIC ACID 650 MG/1
1300 TABLET ORAL 3 TIMES DAILY
Qty: 30 TABLET | Refills: 12 | Status: SHIPPED | OUTPATIENT
Start: 2024-11-08 | End: 2024-11-14

## 2024-11-08 RX ORDER — PAROXETINE 10 MG/1
10 TABLET, FILM COATED ORAL EVERY MORNING
Qty: 30 TABLET | Refills: 11 | Status: SHIPPED | OUTPATIENT
Start: 2024-11-08 | End: 2025-11-08

## 2024-11-08 NOTE — PROGRESS NOTES
Subjective     Patient ID: Wendy Munoz is a 44 y.o. female.    Chief Complaint:  Well Woman      History of Present Illness  44 y.o.  presents for annual exam.  H/o heavy, painful cycles.  Previously, pre-child bearing, she had Mirena IUD and did well, was amenorrheic.  Then had IUD placed postpartum and had perforation at time of insertion (~8 years ago).  Reports since perforation, she now has shooting pain down right leg with each cycle (possible seeding of endometriosis).  Is currently on Lysteda for heavy cycles with some improvement, but still heavy.  Cycles are regular, usually last 7 days, but recently started having a few days of spotting beforehand.  Also had 2wk long cycle in Sept.  Normal cycle last month, just starting cycle this month.  She also notices worsening mood swings around cycles.  Has never been on any anxiety/depression before.    with vasectomy, not trying to conceive.  No other complaints today.  Pap Oct 2023 wnl, HPV neg.  Of note, h/o multiple hernia repairs with mesh.     Gynecologic Exam  The patient's pertinent negatives include no genital itching, genital lesions, genital odor, genital rash, missed menses, pelvic pain, vaginal bleeding or vaginal discharge. Associated symptoms include headaches (h/o migraines). Pertinent negatives include no abdominal pain, chills, constipation, diarrhea or fever. Her past medical history is significant for menorrhagia.         GYN & OB History  Patient's last menstrual period was 2024 (exact date).   Date of Last Pap: 11/3/2023    OB History    Para Term  AB Living   4 3 3   1 3   SAB IAB Ectopic Multiple Live Births   1       3      # Outcome Date GA Lbr Aung/2nd Weight Sex Type Anes PTL Lv   4 Term 10/06/16   4.054 kg (8 lb 15 oz) M Vag-Spont   BEATRICE   3 Term 14   3.487 kg (7 lb 11 oz) F Vag-Spont   BEATRICE   2 Term 13   3.912 kg (8 lb 10 oz) M Vag-Spont   BEATRICE   1 SAB               Obstetric Comments  "  Menarche at 13   H/O abnormal pap (2004)- colpo normal   No other STDs       Review of Systems  Review of Systems   Constitutional:  Negative for chills and fever.   Respiratory:  Negative for shortness of breath.    Cardiovascular:  Negative for chest pain.   Gastrointestinal:  Negative for abdominal pain, constipation and diarrhea.   Genitourinary:  Positive for bladder incontinence (stress), dysmenorrhea, menorrhagia and menstrual problem. Negative for dyspareunia, missed menses, pelvic pain and vaginal discharge.   Musculoskeletal:  Negative for myalgias.   Neurological:  Positive for headaches (h/o migraines).          Objective   Physical Exam    /77   Ht 5' 9" (1.753 m)   Wt 70.8 kg (156 lb 1.4 oz)   LMP 11/08/2024 (Exact Date)   BMI 23.05 kg/m²     Gen: NAD  Resp: Normal respiratory effort  Breast: Symmetric, nontender.  No masses.  No skin changes.  No nipple discharge.   Abd: soft, NT  Pelvic: Normal-appearing external female genitalia.  Small 2mm flesh-colored skin tag noted on right perineum.  Just right to this is an area of erythema, which is causing discomfort/itching.  No vesicle or lesion.  Not c/w HSV or abscess.  No abnormal vaginal discharge noted.  No CMT.  Uterus small, mobile, nontender.  No adnexal masses or tenderness.    Ext: normal ROM  Psych: appropriate affect  Neuro: grossly intact         Assessment and Plan     Wendy was seen today for well woman.    Diagnoses and all orders for this visit:    Well woman exam with routine gynecological exam    Dysmenorrhea  -     US Pelvis Comp with Transvag NON-OB (xpd; Future    Screening for STDs (sexually transmitted diseases)  -     C. trachomatis/N. gonorrhoeae by AMP DNA [facility]; Cervix    Left ovarian cyst  -     US Pelvis Comp with Transvag NON-OB (xpd; Future    PMDD (premenstrual dysphoric disorder)  -     paroxetine (PAXIL) 10 MG tablet; Take 1 tablet (10 mg total) by mouth every morning.    Stress incontinence  -     " Ambulatory referral/consult to Urogynecology; Future    Other orders  -     Discontinue: paroxetine (PAXIL) 20 MG tablet; Take 1 tablet (20 mg total) by mouth every morning.  -     fluconazole (DIFLUCAN) 150 MG Tab; Take one tablet by mouth today.  Take second tablet by mouth in 3 days, if symptoms still present.  -     tranexamic acid (LYSTEDA) 650 mg tablet; Take 2 tablets (1,300 mg total) by mouth 3 (three) times daily. for 5 days          Plan:  Routine annual exam with gyn and breast exam today.  Pap up to date.   STD screening with GC/CT.   Discussed cycles with patient - heavy, painful (leg pain), and PMDD type symptoms - would ideally skip cycles altogether.  Considering another Mirena IUD, as she did well on this in the past before perforation with last insertion.  Also discussed possible Nexplanon.  Could consider ablation, but would likely not help with leg pain or PMDD type symptoms.  Could also consider hysterectomy +/- BSO.    Will get pelvic US due to left ovarian cyst and heavier cycles.    Will start Paxil for PMDD - can take daily, or just during luteal phase.    Referral to urogynecology due to stress incontinence.  Will also discuss possible TVH with Dr. Erwin.    Counseling done, precautions given, all questions answered.  RTC for IUD insertion under US guidance, if she desires to proceed with IUD.  May also consider TVH with urogynecology.

## 2024-11-09 LAB
C TRACH DNA SPEC QL NAA+PROBE: NOT DETECTED
N GONORRHOEA DNA SPEC QL NAA+PROBE: NOT DETECTED

## 2024-11-14 ENCOUNTER — PATIENT MESSAGE (OUTPATIENT)
Dept: HEMATOLOGY/ONCOLOGY | Facility: CLINIC | Age: 44
End: 2024-11-14
Payer: COMMERCIAL

## 2024-12-03 DIAGNOSIS — G43.719 CHRONIC MIGRAINE WITHOUT AURA, INTRACTABLE, WITHOUT STATUS MIGRAINOSUS: ICD-10-CM

## 2024-12-03 RX ORDER — RIMEGEPANT SULFATE 75 MG/75MG
75 TABLET, ORALLY DISINTEGRATING ORAL
Qty: 8 TABLET | Refills: 11 | Status: SHIPPED | OUTPATIENT
Start: 2024-12-03

## 2024-12-13 ENCOUNTER — HOSPITAL ENCOUNTER (OUTPATIENT)
Dept: RADIOLOGY | Facility: OTHER | Age: 44
Discharge: HOME | End: 2024-12-13
Attending: OBSTETRICS & GYNECOLOGY
Payer: COMMERCIAL

## 2024-12-13 DIAGNOSIS — N94.6 DYSMENORRHEA: ICD-10-CM

## 2024-12-13 DIAGNOSIS — N83.202 LEFT OVARIAN CYST: ICD-10-CM

## 2024-12-13 PROCEDURE — 76856 US EXAM PELVIC COMPLETE: CPT | Mod: TC

## 2024-12-13 PROCEDURE — 76856 US EXAM PELVIC COMPLETE: CPT | Mod: 26,,, | Performed by: RADIOLOGY

## 2024-12-13 PROCEDURE — 76830 TRANSVAGINAL US NON-OB: CPT | Mod: 26,,, | Performed by: RADIOLOGY

## 2024-12-16 DIAGNOSIS — D50.9 IRON DEFICIENCY ANEMIA, UNSPECIFIED IRON DEFICIENCY ANEMIA TYPE: Primary | ICD-10-CM

## 2024-12-27 ENCOUNTER — TELEPHONE (OUTPATIENT)
Dept: HEMATOLOGY/ONCOLOGY | Facility: CLINIC | Age: 44
End: 2024-12-27
Payer: COMMERCIAL

## 2024-12-31 ENCOUNTER — OFFICE VISIT (OUTPATIENT)
Dept: HEMATOLOGY/ONCOLOGY | Facility: CLINIC | Age: 44
End: 2024-12-31
Payer: COMMERCIAL

## 2024-12-31 VITALS
TEMPERATURE: 99 F | SYSTOLIC BLOOD PRESSURE: 130 MMHG | WEIGHT: 156.06 LBS | HEART RATE: 77 BPM | BODY MASS INDEX: 23.12 KG/M2 | RESPIRATION RATE: 18 BRPM | OXYGEN SATURATION: 100 % | DIASTOLIC BLOOD PRESSURE: 72 MMHG | HEIGHT: 69 IN

## 2024-12-31 DIAGNOSIS — Z91.89 AT HIGH RISK FOR BREAST CANCER: Primary | ICD-10-CM

## 2024-12-31 PROCEDURE — 99999 PR PBB SHADOW E&M-EST. PATIENT-LVL IV: CPT | Mod: PBBFAC,,, | Performed by: INTERNAL MEDICINE

## 2024-12-31 PROCEDURE — 3078F DIAST BP <80 MM HG: CPT | Mod: CPTII,S$GLB,, | Performed by: INTERNAL MEDICINE

## 2024-12-31 PROCEDURE — 3008F BODY MASS INDEX DOCD: CPT | Mod: CPTII,S$GLB,, | Performed by: INTERNAL MEDICINE

## 2024-12-31 PROCEDURE — 99214 OFFICE O/P EST MOD 30 MIN: CPT | Mod: S$GLB,,, | Performed by: INTERNAL MEDICINE

## 2024-12-31 PROCEDURE — 3075F SYST BP GE 130 - 139MM HG: CPT | Mod: CPTII,S$GLB,, | Performed by: INTERNAL MEDICINE

## 2024-12-31 PROCEDURE — 1159F MED LIST DOCD IN RCRD: CPT | Mod: CPTII,S$GLB,, | Performed by: INTERNAL MEDICINE

## 2024-12-31 NOTE — PROGRESS NOTES
CC:   Increased lifetime risk of breast cancer    HPI:   Wendy Munoz presents for follow up for increased risk of breast cancer. She presents for discussion of oophorectomy/ BSO in risk of breast cancer  Mom had genetic testing and was negative for any mutations.       Today, Feels good and no complaints.   No breast concerns.  Stays active.   MRI reports extremely dense breast    2024 MRI breast - negative    2024 MMG:   Impression:   No mammographic evidence of malignancy.     BI-RADS Category 1: Negative     Recommendation:  Routine screening mammogram in 1 year is recommended.     Your estimated lifetime risk of breast cancer (to age 85) based on Tyrer-Cuzick risk assessment model is 23.15 %.  According to the American Cancer Society, patients with a lifetime breast cancer risk of 20% or higher might benefit from supplemental screening tests, such as screening breast MRI.        High Risk Breast cancer specific history:  - Age: 44 y.o.   - Height:  5'9  - Weight: 154 lbs >153 lbs >156 lbs  - Breast density per BI-RADS:  c-heterogeneously dense> d- extremely dense  - Age at menarche:  14 yo  - Number of pregnancies: ; age of first live birth: 32  - History of breast feeding: Yes - 19 months with 1st child; 2 years with 2nd child; 3 years with last child.    - Age at menopause, if applicable:  N/A   -Uterus and ovaries intact: Yes  - HRT: No  - Genetic testing: No  - Personal history of cancer: basal cell  - Previous chest radiation exposure between ages 10-30 years old: No  - Personal history of breast biopsy: No  - Ashkenazi Mu-ism Inheritance: No  - Family history of cancer:    Mom- breast cancer dx 54, currently 70- brca negative  Few woman in family.   Dad- prostate cancer.    Social History:  Tobacco use:  no  Alcohol use:  social  Exercise regimen: 2 times a week- running, yoga  Employment: OB here at [employer]         Past Medical   Past Medical History:   Diagnosis Date    Acute right  hip pain 11/30/2022    Asthma 2001    Exercise induced    Basal cell carcinoma     Dysmenorrhea 2016    History of COVID-19 08/2021    History of skin cancer 3/29/2024    Metatarsalgia of right foot     Migraine     PUD (peptic ulcer disease)     Sesamoiditis of right foot      Patient Active Problem List   Diagnosis    Migraine headache    Increased risk of breast cancer    Dense breast tissue on mammogram    Family history of breast cancer    Cervical myofascial pain syndrome    Chronic migraine without aura, intractable, without status migrainosus    Decreased ROM of neck    Sesamoiditis of right foot    Acute right hip pain    Weakness of both hips    Weakness of trunk musculature    Essential tremor    Screen for STD (sexually transmitted disease)    Right lower quadrant pain    History of skin cancer    Cervical spondylosis     Social History   Social History     Tobacco Use    Smoking status: Never     Passive exposure: Never    Smokeless tobacco: Never   Substance Use Topics    Alcohol use: Yes     Alcohol/week: 1.0 standard drink of alcohol     Types: 1 Glasses of wine per week     Comment: few times a week    Drug use: Never     Family History  Family History   Problem Relation Name Age of Onset    Breast cancer Mother Evonne Hawkins 50        No genetic testing    Hypertension Mother Evonne Hawkins     Hypertension Father Wilmer Hawkins     Prostate cancer Father Wilmer Hawkins     Hyperlipidemia Father Wilmer Hawkins     Gout Father Wilmer Hawkins     Melanoma Neg Hx      Cataracts Neg Hx      Glaucoma Neg Hx      Macular degeneration Neg Hx       Medications    Current Outpatient Medications:     fish oil-omega-3 fatty acids 300-1,000 mg capsule, Take by mouth once daily., Disp: , Rfl:     fluconazole (DIFLUCAN) 150 MG Tab, Take one tablet by mouth today.  Take second tablet by mouth in 3 days, if symptoms still present., Disp: 2 tablet, Rfl: 3    gabapentin (NEURONTIN) 100 MG capsule, Take 1 capsule (100 mg total)  "by mouth 3 (three) times daily., Disp: 90 capsule, Rfl: 11    magnesium 250 mg Tab, Take by mouth once., Disp: , Rfl:     multivitamin (THERAGRAN) per tablet, Take 1 tablet by mouth once daily., Disp: , Rfl:     ondansetron (ZOFRAN-ODT) 4 MG TbDL, Take 1 tablet by mouth every 8 (eight) hours for 5 days, Disp: 15 tablet, Rfl: 0    paroxetine (PAXIL) 10 MG tablet, Take 1 tablet (10 mg total) by mouth every morning., Disp: 30 tablet, Rfl: 11    rimegepant (NURTEC) 75 mg odt, Take 1 tablet (75 mg total) by mouth as needed for Migraine. Place ODT tablet on the tongue; alternatively the ODT tablet may be placed under the tongue, Disp: 8 tablet, Rfl: 11    tiZANidine (ZANAFLEX) 2 MG tablet, Take 1-2 tablets (2-4 mg total) by mouth 3 (three) times daily., Disp: 180 tablet, Rfl: 11    tretinoin (RETIN-A) 0.025 % cream, Apply small amount to entire face every night at bedtime. Wash off every morning and apply sunscreen., Disp: 45 g, Rfl: 3    Current Facility-Administered Medications:     albuterol inhaler 2 puff, 2 puff, Inhalation, Q20 Min PRN, Yasir Callahan MD  Allergies  Review of patient's allergies indicates:   Allergen Reactions    Latex, natural rubber      Rash/itching    Topamax [topiramate]      Gout       Review of Systems       See above   All other systems reviewed and are negative.    Objective:      Vitals:   Vitals:    12/31/24 1328   BP: 130/72   BP Location: Left arm   Patient Position: Sitting   Pulse: 77   Resp: 18   Temp: 98.7 °F (37.1 °C)   TempSrc: Oral   SpO2: 100%   Weight: 70.8 kg (156 lb 1.4 oz)   Height: 5' 9" (1.753 m)       BMI: Body mass index is 23.05 kg/m².   Body surface area is 1.86 meters squared.    Deferred       Laboratory Data: reviewed most recent   Imaging: reviewed most recent        Assessment:     1. At high risk for breast cancer          Plan:     Long discussion of risks and benefits of progesterone therapy for heavy menses. No concrete data on progesterone only use and " increased risk of breast cancer with large randomized trials.   She has considered vaginal hysterectomy/ BS without oophorectomy. Discussed no benefit in risk of breast cancer with this surgery. BS is currently under investigation with BRCA patients to determine non-inferiority of BS vs BSO  Continue in high risk clinic. Continue with alternating annual mammogram and annual breast MRI along with semiannual CBEs.  Breast awareness   Lifestyle modifications as previously discussed.   MRI breast in 1 year on same day  MMG due 2/2025  Had previously briefly discussed chemoprevention with tamoxifen. Continue to opt out for now. She may want to consider at 52yo    RT 1 year    Route Chart for Scheduling    Med Onc Chart Routing      Follow up with physician    Follow up with [facility] . With PJ as scheduled   Infusion scheduling note    Injection scheduling note    Labs    Imaging    Pharmacy appointment    Other referrals                       30 minutes of total time spent on the encounter, which includes face to face time and non-face to face time preparing to see the patient (eg, review of tests), Obtaining and/or reviewing separately obtained history, Documenting clinical information in the electronic or other health record, Independently interpreting results (not separately reported) and communicating results to the patient/family/caregiver, or Care coordination (not separately reported).

## 2025-01-02 ENCOUNTER — TELEPHONE (OUTPATIENT)
Dept: OBSTETRICS AND GYNECOLOGY | Facility: CLINIC | Age: 45
End: 2025-01-02
Payer: COMMERCIAL

## 2025-01-02 NOTE — TELEPHONE ENCOUNTER
----- Message from Parul Ohsea MD sent at 1/2/2025 11:48 AM CST -----  Regarding: Visit to discuss AUB management options  Please schedule her for visit on 1/17/25 at 2:30pm for GYN visit and possible EMB. Put a hold on another 15 minute visit that afternoon though ok not in a row just to not overbook me. Thanks. Patient aware of day/time.  
done  
Capillary refill less/equal to 2 seconds/Strong peripheral pulses

## 2025-01-15 LAB — CRC RECOMMENDATION EXT: NORMAL

## 2025-01-17 ENCOUNTER — OFFICE VISIT (OUTPATIENT)
Dept: OBSTETRICS AND GYNECOLOGY | Facility: CLINIC | Age: 45
End: 2025-01-17
Payer: COMMERCIAL

## 2025-01-17 VITALS
HEIGHT: 69 IN | DIASTOLIC BLOOD PRESSURE: 80 MMHG | WEIGHT: 156.5 LBS | BODY MASS INDEX: 23.18 KG/M2 | HEART RATE: 76 BPM | SYSTOLIC BLOOD PRESSURE: 133 MMHG

## 2025-01-17 DIAGNOSIS — Z01.812 PRE-PROCEDURE LAB EXAM: Primary | ICD-10-CM

## 2025-01-17 DIAGNOSIS — R93.89 THICKENED ENDOMETRIUM: ICD-10-CM

## 2025-01-17 DIAGNOSIS — N84.1 ENDOCERVICAL POLYP: ICD-10-CM

## 2025-01-17 DIAGNOSIS — N93.9 ABNORMAL UTERINE BLEEDING (AUB): Primary | ICD-10-CM

## 2025-01-17 DIAGNOSIS — Z01.812 PRE-PROCEDURE LAB EXAM: ICD-10-CM

## 2025-01-17 LAB
B-HCG UR QL: NEGATIVE
CTP QC/QA: YES

## 2025-01-17 PROCEDURE — 99999 PR PBB SHADOW E&M-EST. PATIENT-LVL IV: CPT | Mod: PBBFAC,,, | Performed by: OBSTETRICS & GYNECOLOGY

## 2025-01-17 PROCEDURE — 81025 URINE PREGNANCY TEST: CPT | Mod: S$GLB,,, | Performed by: OBSTETRICS & GYNECOLOGY

## 2025-01-17 PROCEDURE — 99499 UNLISTED E&M SERVICE: CPT | Mod: S$GLB,,, | Performed by: OBSTETRICS & GYNECOLOGY

## 2025-01-17 PROCEDURE — 58100 BIOPSY OF UTERUS LINING: CPT | Mod: S$GLB,,, | Performed by: OBSTETRICS & GYNECOLOGY

## 2025-01-17 PROCEDURE — 88305 TISSUE EXAM BY PATHOLOGIST: CPT | Performed by: PATHOLOGY

## 2025-01-17 PROCEDURE — 88305 TISSUE EXAM BY PATHOLOGIST: CPT | Mod: 26,,, | Performed by: PATHOLOGY

## 2025-01-21 ENCOUNTER — PATIENT OUTREACH (OUTPATIENT)
Dept: ADMINISTRATIVE | Facility: HOSPITAL | Age: 45
End: 2025-01-21
Payer: COMMERCIAL

## 2025-01-21 NOTE — PROGRESS NOTES
HM updated with colonoscopy  , care everywhere, immunizations updated  Chart review complete  Health Maintenance Due   Topic Date Due    Mammogram  02/27/2025

## 2025-01-25 LAB
FINAL PATHOLOGIC DIAGNOSIS: NORMAL
GROSS: NORMAL
Lab: NORMAL

## 2025-02-04 ENCOUNTER — TELEPHONE (OUTPATIENT)
Dept: OBSTETRICS AND GYNECOLOGY | Facility: CLINIC | Age: 45
End: 2025-02-04
Payer: COMMERCIAL

## 2025-02-04 DIAGNOSIS — N93.9 ABNORMAL UTERINE BLEEDING (AUB): Primary | ICD-10-CM

## 2025-02-04 DIAGNOSIS — N84.1 ENDOCERVICAL POLYP: ICD-10-CM

## 2025-02-04 NOTE — TELEPHONE ENCOUNTER
Requested Date:  2/26/25    Requested Time:  anytime between 7:30am-1pm ok with me    Case Length:60 minutes    Surgeon: Parul Oshea      Assistant Surgeon: None   Visit Type: Outpatient    LOCATION: [facility]    PROCEDURE:Hysteroscopy dilation and curettage with polypectomy, IUD insertion  Diagnosis:Abnormal uterine bleeding due to endometrial polyp  Anesthesia type: General   Comments/Special Equipment Needed:  Rep needed: No  Does the patient need a PreOp appointment with MD: Yes  U/S needed at pre-op: No  PCP clearance: Not Needed  When does she need her Post op appointment: 4 weeks in person  Do you need additional time blocked out from clinic? No will adjust clinic once timing confirmed by OR  If so, what time do you want to be back in clinic? Need 60 minutes blocked  When can the patient go back to work? 48 hours or sooner if feeling ok

## 2025-02-07 NOTE — TELEPHONE ENCOUNTER
Let her know we put a hold on 3/26 at 12pm for her for now at [facility] and can move or change exact plan when she has decided for sure

## 2025-02-10 NOTE — TELEPHONE ENCOUNTER
Pt states she has received your text this AM but hasn't had a chance to respond.      She thinks she wants to go through with everything but would like to discuss having an ablation instead of Mirena.      Advised this would likely need to be discussed in person, could probably do that at pre op appt.

## 2025-02-18 ENCOUNTER — TELEPHONE (OUTPATIENT)
Dept: UROGYNECOLOGY | Facility: CLINIC | Age: 45
End: 2025-02-18
Payer: COMMERCIAL

## 2025-02-18 ENCOUNTER — PATIENT MESSAGE (OUTPATIENT)
Dept: DERMATOLOGY | Facility: CLINIC | Age: 45
End: 2025-02-18
Payer: COMMERCIAL

## 2025-02-19 ENCOUNTER — TELEPHONE (OUTPATIENT)
Dept: OBSTETRICS AND GYNECOLOGY | Facility: CLINIC | Age: 45
End: 2025-02-19
Payer: COMMERCIAL

## 2025-02-19 NOTE — TELEPHONE ENCOUNTER
----- Message from Parul Oshea MD sent at 2/17/2025  5:15 PM CST -----  Regarding: Surgery  Please cancel surgery on 2/26. Patient wants to wait for now.

## 2025-03-10 NOTE — PROCEDURES
Endometrial biopsy    Date/Time: 1/17/2025 2:30 PM    Performed by: Parul Oshea MD  Authorized by: Parul Oshea MD    Consent:     Prior to procedure the appropriate consent was completed and verified      Consent given by:  Patient    Patient questions answered: yes      Patient agrees, verbalizes understanding, and wants to proceed: yes      Educational handouts given: yes      Instructions and paperwork completed: yes    Indication:     Indications: Menorrhagia    Pre-procedure:     Pre-procedure timeout performed: yes    Procedure:     Procedure: endometrial biopsy with Pipelle      Cervix cleaned and prepped: yes      The cervix was dilated using cervical dilators: yes      Use of single-tooth tenaculum: yes      Uterus sounded: yes      Uterus sound depth (cm):  7    Curettes used:  1    Specimen collected: specimen collected and sent to pathology      Patient tolerated procedure well with no complications: yes    Comments:     Procedure comments:  UPT negative

## 2025-03-12 ENCOUNTER — HOSPITAL ENCOUNTER (OUTPATIENT)
Dept: RADIOLOGY | Facility: HOSPITAL | Age: 45
Discharge: HOME | End: 2025-03-12
Attending: NURSE PRACTITIONER
Payer: COMMERCIAL

## 2025-03-12 ENCOUNTER — OFFICE VISIT (OUTPATIENT)
Dept: UROGYNECOLOGY | Facility: CLINIC | Age: 45
End: 2025-03-12
Payer: COMMERCIAL

## 2025-03-12 VITALS
HEART RATE: 70 BPM | BODY MASS INDEX: 23.54 KG/M2 | HEIGHT: 69 IN | DIASTOLIC BLOOD PRESSURE: 89 MMHG | SYSTOLIC BLOOD PRESSURE: 146 MMHG | WEIGHT: 158.94 LBS

## 2025-03-12 DIAGNOSIS — Z12.31 ENCOUNTER FOR SCREENING MAMMOGRAM FOR BREAST CANCER: ICD-10-CM

## 2025-03-12 DIAGNOSIS — N39.3 URINARY, INCONTINENCE, STRESS FEMALE: ICD-10-CM

## 2025-03-12 DIAGNOSIS — N81.4 UTEROVAGINAL PROLAPSE: ICD-10-CM

## 2025-03-12 DIAGNOSIS — N81.6 RECTOCELE, FEMALE: ICD-10-CM

## 2025-03-12 DIAGNOSIS — K59.09 CHRONIC CONSTIPATION: Primary | ICD-10-CM

## 2025-03-12 PROBLEM — N39.46 MIXED STRESS AND URGE URINARY INCONTINENCE: Status: ACTIVE | Noted: 2025-03-12

## 2025-03-12 PROCEDURE — 99999 PR PBB SHADOW E&M-EST. PATIENT-LVL III: CPT | Mod: PBBFAC,,, | Performed by: OBSTETRICS & GYNECOLOGY

## 2025-03-12 PROCEDURE — 77067 SCR MAMMO BI INCL CAD: CPT | Mod: TC

## 2025-03-12 PROCEDURE — 77063 BREAST TOMOSYNTHESIS BI: CPT | Mod: 26,,, | Performed by: RADIOLOGY

## 2025-03-12 PROCEDURE — 77067 SCR MAMMO BI INCL CAD: CPT | Mod: 26,,, | Performed by: RADIOLOGY

## 2025-03-12 NOTE — PATIENT INSTRUCTIONS
1)  Stage 2 cystocele, stage 1 uterovaginal prolapse:  --discussed  --options: PT vs pessary vs surgery   --if surgery: TVH, USS, poss A&P; would like salpingectomy-->VNOTES   --if surgery:  bladder testing to see if need sling or bulking for stress leakage   --if surgery: PCP clearance ([facility]brone) + labs (CBC, CMP, T&S)/EKG    2)  Menorrhagia:  --discussed  --IUD (had perforation in past, +FH breast cancer) vs ablation (starting tamoxifen) vs hysterectomy   --failed P4 oral pills--failed  --would like hysterectomy    3)  Stress incontinence:  --urine C&S  --Empty bladder every 3 hours.  Empty well: wait a minute, lean forward on toilet.    --Avoid dietary irritants (see sheet).  Keep diary x 3-5 days to determine your irritants.  --KEGELS: do 10 in AM and 10 in PM, holding each x 10 seconds.  When you feel urge to go, STOP, KEGEL, and when urge has passed, then go to bathroom.  Consider PT in future.    --STRESS:  Pessary vs. Sling vs bulking.     4)  Will call you to set up surgery     Constipation:  --hydrate well  Controlling constipation may help bladder urgency/leakage and fiber may better control cholesterol and blood glucose.  Start daily fiber.  Take 1 tsp of fiber powder (psyllium or other sugar-free powder).  Mix in 8 oz of water.  Take x 3-5 days.  Then, increase fiber by 1 tsp every 3-5 days until stool is easy to pass.  Stop and continue at that dose.   Do not exceed 6 tsps/day.  May also use over the counter stool softener 1-2 x/day.  AVOID laxatives.

## 2025-03-12 NOTE — PROGRESS NOTES
[facility] - UR[facility]YN 4TH FLOOR  [address]  [facility] [address]    Wendy Hawkins [facility]  [id number]  1980  March 12, 2025    Consulting Physician: Aletha Erwin MD   GYN: MD Dorie   Primary M.D.: Ofelia Arango MD    No chief complaint on file.    HPI:     1)  UI:  (+) ISIS. (--) UUI.(--) pads. Daytime frequency: Q 3 hours.  Nocturia: Yes: 1/night.   (--) dysuria,  (--) hematuria,  (--) frequent UTIs.  (+) complete bladder emptying.    2)  POP:  Absent. (--) vaginal discharge. (+) sexually active.  (--) dyspareunia.  (--)  Vaginal dryness.  (--) vaginal estrogen use.     3)  BM:  (+) constipation/straining, sometimes but taking FeSO4.  (--) chronic diarrhea. (--) hematochezia.  (--) fecal incontinence.  (--) fecal smearing/urgency.  (--) complete evacuation.  Thinks about splinting--rafi when hard.     4)  menorrhagia:  11/2024:  Uterus:  Measures 7.4 x 3.7 x 6.4 cm in dimensions.  No discrete uterine fibroids identified.  The endometrium is normal thickness measuring 10 mm.  Small isoechoic mass noted within the endometrium measuring 1.6 x 1.2 x 0.8 cm, suggestive of a polyp.  Additional focal area of thickening noted in the endocervical canal measuring 0.8 x 0.6 x 0.5 cm.  Ovaries:  The ovaries are normal in size.  The right ovary measures 2.0 x 2.2 x 2.2 cm.  The left ovary measures 3.1 x 1.8 x 2.6 cm. Ovarian follicles noted.  Doppler flow demonstrated to both ovaries.  Free Fluid:  None.  Impression:  Endometrium normal thickness (10 mm).  However, there is a small isoechoic mass within the endometrium, as above, possible polyp.  Additional indeterminate 8 mm focal area of thickening in the endocervical canal.  --2023 L adnexal cyst 2023    --EMBx 2024 secretory, benign    Past Medical History  Past Medical History:   Diagnosis Date    Acute right hip pain 11/30/2022    Asthma 2001    Exercise induced    Basal cell carcinoma     Dysmenorrhea 2016    History of COVID-19 08/2021    History of  skin cancer 3/29/2024    Metatarsalgia of right foot     Migraine     PUD (peptic ulcer disease)     Sesamoiditis of right foot         Past Surgical History  Past Surgical History:   Procedure Laterality Date    ABDOMINAL SURGERY  3015    Umbilical, bilateral inguinal  hernia repair    HERNIA REPAIR      umbilical, inguinal hernia repair    HYSTEROSCOPY  2011    Hysteroscopy polypectomy    HYSTEROSCOPY W/ POLYPECTOMY      PELVIC LAPAROSCOPY  2016    IUD removal    REMOVAL OF INTRAUTERINE DEVICE (IUD)      laparoscopy for perforated IUD    SKIN BIOPSY     --umbilical hernia: mesh  --inguinal hernia: mesh  --found other hernias and had other mesh placed  --Dx LSC for IUD removal--adhesion to umb mesh; no pelvic adhesions    Hysterectomy: No    Past Ob History     x 3.  C/s x 0.  Largest infant weight: 8#15oz  no FAVD. no episiotomy.  Partial 3rd.     Gynecologic History  LMP: Patient's last menstrual period was 2025.  Age of menarche: 14 yo  Age of menopause: NA  Menstrual history: menorrhagia (2.5 weeks/month) iron def anemia; Mirena perforated on insertion and that was removed  Pap test: 10/2023, HPV neg.  History of abnormal paps: Yes - remote--resolved.  History of STIs:  No,   Mammogram: Date of last: .  Result: Normal. Results from today pending. Elevated TC.  Has to start tamoxifen.   Colonoscopy: Date of last: .  Result:  normal.  Repeat due:  .    DEXA:deferred    Family History  Family History   Problem Relation Name Age of Onset    Breast cancer Mother Evonne Hawkins 50        No genetic testing    Hypertension Mother Evonne Hawkins     Hypertension Father Wilmer Hawkins     Prostate cancer Father Wilmer Hawkins     Hyperlipidemia Father Wilmer Hawkins     Gout Father Wilmer Hawkins     Melanoma Neg Hx      Cataracts Neg Hx      Glaucoma Neg Hx      Macular degeneration Neg Hx        Colon CA: No  Breast CA: Yes - mother (53 yo); BRCA NEG  GYN CA: No   CA: No    Social  "History  Tobacco Use History[1].    Social History     Substance and Sexual Activity   Alcohol Use Yes    Alcohol/week: 1.0 standard drink of alcohol    Types: 1 Glasses of wine per week    Comment: few times a week   .    Social History     Substance and Sexual Activity   Drug Use Never     The patient is .  Resides in [place] [address].  Employment status: currently employed [employer].     Allergies  Review of patient's allergies indicates:   Allergen Reactions    Latex, natural rubber      Rash/itching    Topamax [topiramate]      Gout       Medications  Medications Ordered Prior to Encounter[2]    Review of Systems A 14 point ROS was reviewed with pertinent positives as noted above in the history of present illness.      Constitutional: negative  Eyes: negative  Endocrine: negative  Gastrointestinal: negative  Cardiovascular: negative  Respiratory: negative  Allergic/Immunologic: negative  Integumentary: negative  Psychiatric: negative  Musculoskeletal: negative   Ear/Nose/Throat: negative  Neurologic: negative  Genitourinary: SEE HPI  Hematologic/Lymphatic: negative   Breast: negative    Urogynecologic Exam  BP (!) 146/89   Pulse 70   Ht 5' 9" (1.753 m)   Wt 72.1 kg (158 lb 15.2 oz)   LMP 02/24/2025   BMI 23.47 kg/m²     GENERAL APPEARANCE:  The patient is well-developed, well-nourished.   Neck:  Supple with no thyromegaly, no carotid bruits.  Heart:  Regular rate and rhythm, no murmurs, rubs or gallops.  Lungs:  Clear.  No CVA tenderness.  Abdomen:  Soft, nontender, nondistended, no hepatosplenomegaly.  Incisions:  umb incision, LSC     PELVIC:    External genitalia:  Normal Bartholins, Skenes and labia bilaterally.    Urethra:  No caruncle, diverticulum or masses.  (+) hypermobility.    Vagina:  Atrophy (--) , no bladder masses or tender, no discharge.    Cervix:  normal appearance  Uterus: normal size, contour, position, consistency, mobility, non-tender  Adnexa: Not " palpable.    POP-Q:  Aa 0; Ba 0; C -4; Ap -2; Bp -2; D -7.  Genital hiatus 4, perineal body 3, total vaginal length 10-11.    NEUROLOGIC:  Cranial nerves 2 through 12 intact.  Strength 5/5.  DTRs 2+ lower extremities.  S2 through 4 normal.  Sacral reflexes intact.    EXT: PEDERSON, 2+ pulses bilaterally, no C/C/E    COUGH STRESS TEST:  negative  KEGEL: 1 /5    RECTAL:    External:  Normal, (--) hemorrhoids, (--) dovetailing.   Internal:   deferred    PVR: 10 mL    Impression    1. Chronic constipation    2. Uterovaginal prolapse    3. Rectocele, female    4. Urinary, incontinence, stress female        Initial Plan  The patient was counseled regarding these issues. The patient was given a summary sheet containing each of these issues with possible options for evaluation and management. When appropriate, we also reviewed computer-generated diagrams specific to their diagnoses..  All questions were addressed to the patient's satisfaction.    1)  Stage 2 cystocele, stage 1 uterovaginal prolapse:  --discussed  --options: PT vs pessary vs surgery   --if surgery: TVH, USS, poss A&P (has incomplete defecation); would like salpingectomy-->VNOTES   --if surgery:  bladder testing to see if need sling or bulking for stress leakage   --if surgery: PCP clearance (Nitinrone) + labs (CBC, CMP, T&S)/EKG    2)  Menorrhagia:  --discussed  --IUD (had perforation in past, +FH breast cancer) vs ablation (starting tamoxifen for elevated TC score) vs hysterectomy   --failed P4 oral pills--failed  --would like hysterectomy    3)  Stress incontinence:  --urine C&S  --Empty bladder every 3 hours.  Empty well: wait a minute, lean forward on toilet.    --Avoid dietary irritants (see sheet).  Keep diary x 3-5 days to determine your irritants.  --KEGELS: do 10 in AM and 10 in PM, holding each x 10 seconds.  When you feel urge to go, STOP, KEGEL, and when urge has passed, then go to bathroom.  Consider PT in future.    --STRESS:  Pessary vs. Sling vs  bulking.     4)  Intermittent constipation with incomplete defecation:  --take FeSO4  --hydrate well  Controlling constipation may help bladder urgency/leakage and fiber may better control cholesterol and blood glucose.  Start daily fiber.  Take 1 tsp of fiber powder (psyllium or other sugar-free powder).  Mix in 8 oz of water.  Take x 3-5 days.  Then, increase fiber by 1 tsp every 3-5 days until stool is easy to pass.  Stop and continue at that dose.   Do not exceed 6 tsps/day.  May also use over the counter stool softener 1-2 x/day.  AVOID laxatives.    5)  Will call you to set up surgery     Approximately 60 min were spent in consult, 90 % in discussion.     Thank you for requesting consultation of your patient.  I look forward to participating in their care.    Visit complexity today is associated with medical care services that are part of the ongoing care related to the single serious and/or complex condition of Mixed urinary incontinence (KELLY) and pelvic organ prolapse . A longitudinal relationship exists or is being developed between the patient and this practitioner for the care of this condition.     Aletha Erwin  Female Pelvic Medicine and Reconstructive Surgery  [facility] [place], [place]           [1]   Social History  Tobacco Use   Smoking Status Never    Passive exposure: Never   Smokeless Tobacco Never   [2]   Current Outpatient Medications on File Prior to Visit   Medication Sig Dispense Refill    fish oil-omega-3 fatty acids 300-1,000 mg capsule Take by mouth once daily.      fluconazole (DIFLUCAN) 150 MG Tab Take one tablet by mouth today.  Take second tablet by mouth in 3 days, if symptoms still present. 2 tablet 3    gabapentin (NEURONTIN) 100 MG capsule Take 1 capsule (100 mg total) by mouth 3 (three) times daily. 90 capsule 11    magnesium 250 mg Tab Take by mouth once.      multivitamin (THERAGRAN) per tablet Take 1 tablet by mouth once daily.      rimegepant (NURTEC) 75 mg odt  Take 1 tablet (75 mg total) by mouth as needed for Migraine. Place ODT tablet on the tongue; alternatively the ODT tablet may be placed under the tongue; MAX 1 TAB/24 HOURS 8 tablet 11    tiZANidine (ZANAFLEX) 2 MG tablet Take 1-2 tablets (2-4 mg total) by mouth 3 (three) times daily. 180 tablet 11    tretinoin (RETIN-A) 0.025 % cream Apply small amount to entire face every night at bedtime. Wash off every morning and apply sunscreen. 45 g 3     Current Facility-Administered Medications on File Prior to Visit   Medication Dose Route Frequency Provider Last Rate Last Admin    albuterol inhaler 2 puff  2 puff Inhalation Q20 Min Yasir Shannon MD

## 2025-03-31 ENCOUNTER — TELEPHONE (OUTPATIENT)
Dept: UROGYNECOLOGY | Facility: CLINIC | Age: 45
End: 2025-03-31
Payer: COMMERCIAL

## 2025-03-31 DIAGNOSIS — N81.4 UTEROVAGINAL PROLAPSE: Primary | ICD-10-CM

## 2025-03-31 DIAGNOSIS — N81.6 RECTOCELE, FEMALE: ICD-10-CM

## 2025-03-31 DIAGNOSIS — N39.3 URINARY, INCONTINENCE, STRESS FEMALE: ICD-10-CM

## 2025-04-01 ENCOUNTER — TELEPHONE (OUTPATIENT)
Dept: PRIMARY CARE CLINIC | Facility: CLINIC | Age: 45
End: 2025-04-01
Payer: COMMERCIAL

## 2025-04-01 DIAGNOSIS — N39.3 URINARY, INCONTINENCE, STRESS FEMALE: ICD-10-CM

## 2025-04-01 DIAGNOSIS — N81.6 RECTOCELE, FEMALE: ICD-10-CM

## 2025-04-01 DIAGNOSIS — K59.09 CHRONIC CONSTIPATION: ICD-10-CM

## 2025-04-01 DIAGNOSIS — N81.4 UTEROVAGINAL PROLAPSE: Primary | ICD-10-CM

## 2025-04-01 NOTE — TELEPHONE ENCOUNTER
----- Message from Med Assistant Ta sent at 4/1/2025  8:36 AM CDT -----  Regarding: Preop Clearance  Wendy is scheduled for a Laparoscopic Assisted Vaginal Hysterectomy with Dr Erwin on 6/12/25. Please assist with surgical clearance. Please obtain a CBC, BMP, & any other testing you deem necessary Once completed, please just specify in your clinic note in EPIC whether the patient is cleared or fax a report to our office at [phone]. If you have any questions, please don't hesitate to reach out to us. Thank you!Cely GoddardeSurgery SchedulerOchsner Baptist [phone] (Main Office)[phone] (Direct Line)

## 2025-04-01 NOTE — TELEPHONE ENCOUNTER
Pt is Schedule for Preop Clearance 5/14/25 Pt will Need CBC BMP  Wendy is scheduled for a Laparoscopic Assisted Vaginal Hysterectomy with Dr Erwin on 6/12/25. Please assist with surgical clearance. Please obtain a CBC, BMP

## 2025-04-14 ENCOUNTER — PATIENT MESSAGE (OUTPATIENT)
Dept: UROGYNECOLOGY | Facility: CLINIC | Age: 45
End: 2025-04-14
Payer: COMMERCIAL

## 2025-04-16 ENCOUNTER — TELEPHONE (OUTPATIENT)
Dept: UROGYNECOLOGY | Facility: CLINIC | Age: 45
End: 2025-04-16
Payer: COMMERCIAL

## 2025-05-12 ENCOUNTER — PATIENT MESSAGE (OUTPATIENT)
Dept: UROGYNECOLOGY | Facility: CLINIC | Age: 45
End: 2025-05-12
Payer: COMMERCIAL
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
## 2025-05-14 ENCOUNTER — PATIENT MESSAGE (OUTPATIENT)
Dept: HEMATOLOGY/ONCOLOGY | Facility: CLINIC | Age: 45
End: 2025-05-14
Payer: COMMERCIAL

## 2025-05-14 ENCOUNTER — LAB VISIT (OUTPATIENT)
Dept: LAB | Facility: HOSPITAL | Age: 45
End: 2025-05-14
Attending: INTERNAL MEDICINE
Payer: COMMERCIAL

## 2025-05-14 ENCOUNTER — OFFICE VISIT (OUTPATIENT)
Dept: PRIMARY CARE CLINIC | Facility: CLINIC | Age: 45
End: 2025-05-14
Payer: COMMERCIAL

## 2025-05-14 VITALS
OXYGEN SATURATION: 99 % | HEART RATE: 71 BPM | SYSTOLIC BLOOD PRESSURE: 126 MMHG | WEIGHT: 160.06 LBS | TEMPERATURE: 99 F | DIASTOLIC BLOOD PRESSURE: 72 MMHG | RESPIRATION RATE: 17 BRPM | BODY MASS INDEX: 23.71 KG/M2 | HEIGHT: 69 IN

## 2025-05-14 DIAGNOSIS — D64.9 NORMOCYTIC ANEMIA: ICD-10-CM

## 2025-05-14 DIAGNOSIS — Z01.818 PREOP EXAMINATION: ICD-10-CM

## 2025-05-14 DIAGNOSIS — Z11.3 SCREEN FOR STD (SEXUALLY TRANSMITTED DISEASE): ICD-10-CM

## 2025-05-14 DIAGNOSIS — Z01.818 PREOP EXAMINATION: Primary | ICD-10-CM

## 2025-05-14 DIAGNOSIS — J02.9 ACUTE PHARYNGITIS, UNSPECIFIED ETIOLOGY: ICD-10-CM

## 2025-05-14 LAB
ABSOLUTE EOSINOPHIL (OHS): 0.09 K/UL
ABSOLUTE MONOCYTE (OHS): 0.48 K/UL (ref 0.3–1)
ABSOLUTE NEUTROPHIL COUNT (OHS): 4.85 K/UL (ref 1.8–7.7)
ALBUMIN SERPL BCP-MCNC: 4.3 G/DL (ref 3.5–5.2)
ALP SERPL-CCNC: 61 UNIT/L (ref 40–150)
ALT SERPL W/O P-5'-P-CCNC: 12 UNIT/L (ref 10–44)
ANION GAP (OHS): 11 MMOL/L (ref 8–16)
AST SERPL-CCNC: 19 UNIT/L (ref 11–45)
BASOPHILS # BLD AUTO: 0.08 K/UL
BASOPHILS NFR BLD AUTO: 1 %
BILIRUB SERPL-MCNC: 0.5 MG/DL (ref 0.1–1)
BUN SERPL-MCNC: 10 MG/DL (ref 6–20)
CALCIUM SERPL-MCNC: 9.4 MG/DL (ref 8.7–10.5)
CHLORIDE SERPL-SCNC: 106 MMOL/L (ref 95–110)
CO2 SERPL-SCNC: 21 MMOL/L (ref 23–29)
CREAT SERPL-MCNC: 0.8 MG/DL (ref 0.5–1.4)
ERYTHROCYTE [DISTWIDTH] IN BLOOD BY AUTOMATED COUNT: 12 % (ref 11.5–14.5)
FERRITIN SERPL-MCNC: 40 NG/ML (ref 20–300)
GFR SERPLBLD CREATININE-BSD FMLA CKD-EPI: >60 ML/MIN/1.73/M2
GLUCOSE SERPL-MCNC: 77 MG/DL (ref 70–110)
HCT VFR BLD AUTO: 39.3 % (ref 37–48.5)
HGB BLD-MCNC: 13.4 GM/DL (ref 12–16)
HIV 1+2 AB+HIV1 P24 AG SERPL QL IA: NORMAL
IMM GRANULOCYTES # BLD AUTO: 0.02 K/UL (ref 0–0.04)
IMM GRANULOCYTES NFR BLD AUTO: 0.2 % (ref 0–0.5)
IRON SATN MFR SERPL: 22 % (ref 20–50)
IRON SERPL-MCNC: 89 UG/DL (ref 30–160)
LYMPHOCYTES # BLD AUTO: 2.64 K/UL (ref 1–4.8)
MCH RBC QN AUTO: 31.1 PG (ref 27–31)
MCHC RBC AUTO-ENTMCNC: 34.1 G/DL (ref 32–36)
MCV RBC AUTO: 91 FL (ref 82–98)
NUCLEATED RBC (/100WBC) (OHS): 0 /100 WBC
OHS QRS DURATION: 88 MS
OHS QTC CALCULATION: 429 MS
PLATELET # BLD AUTO: 312 K/UL (ref 150–450)
PMV BLD AUTO: 9.6 FL (ref 9.2–12.9)
POTASSIUM SERPL-SCNC: 3.8 MMOL/L (ref 3.5–5.1)
PROT SERPL-MCNC: 7.6 GM/DL (ref 6–8.4)
RBC # BLD AUTO: 4.31 M/UL (ref 4–5.4)
RELATIVE EOSINOPHIL (OHS): 1.1 %
RELATIVE LYMPHOCYTE (OHS): 32.4 % (ref 18–48)
RELATIVE MONOCYTE (OHS): 5.9 % (ref 4–15)
RELATIVE NEUTROPHIL (OHS): 59.4 % (ref 38–73)
SODIUM SERPL-SCNC: 138 MMOL/L (ref 136–145)
TIBC SERPL-MCNC: 413 UG/DL (ref 250–450)
TRANSFERRIN SERPL-MCNC: 279 MG/DL (ref 200–375)
WBC # BLD AUTO: 8.16 K/UL (ref 3.9–12.7)

## 2025-05-14 PROCEDURE — 82040 ASSAY OF SERUM ALBUMIN: CPT

## 2025-05-14 PROCEDURE — 80074 ACUTE HEPATITIS PANEL: CPT

## 2025-05-14 PROCEDURE — 99999 PR PBB SHADOW E&M-EST. PATIENT-LVL V: CPT | Mod: PBBFAC,,, | Performed by: INTERNAL MEDICINE

## 2025-05-14 PROCEDURE — 83540 ASSAY OF IRON: CPT

## 2025-05-14 PROCEDURE — 93010 ELECTROCARDIOGRAM REPORT: CPT | Mod: S$GLB,,, | Performed by: INTERNAL MEDICINE

## 2025-05-14 PROCEDURE — 86593 SYPHILIS TEST NON-TREP QUANT: CPT

## 2025-05-14 PROCEDURE — 87389 HIV-1 AG W/HIV-1&-2 AB AG IA: CPT

## 2025-05-14 PROCEDURE — 36415 COLL VENOUS BLD VENIPUNCTURE: CPT | Mod: PN

## 2025-05-14 PROCEDURE — 85025 COMPLETE CBC W/AUTO DIFF WBC: CPT

## 2025-05-14 PROCEDURE — 82728 ASSAY OF FERRITIN: CPT

## 2025-05-15 LAB
HAV IGM SERPL QL IA: NORMAL
HBV CORE IGM SERPL QL IA: NORMAL
HBV SURFACE AG SERPL QL IA: NORMAL
HCV AB SERPL QL IA: NORMAL
T PALLIDUM IGG+IGM SER QL: NORMAL

## 2025-05-16 ENCOUNTER — RESULTS FOLLOW-UP (OUTPATIENT)
Dept: PRIMARY CARE CLINIC | Facility: CLINIC | Age: 45
End: 2025-05-16

## 2025-05-16 NOTE — PROGRESS NOTES
I sent pt a my chart message -  I reviewed your  labs and EKG - Normal sinus rhythm.  You are clear for your surgery. I have let [facility]'s staff know. Your iron studies were normal.  Your HIV screening was negative. Your acute hepatitis panel was negative.  Your syphilis test was negative. You are not anemic. Your kidney and liver functions looked good.  The Mildly  decreased  CO2 -21 was not concerning.   Ofelia

## 2025-05-30 ENCOUNTER — ANESTHESIA EVENT (OUTPATIENT)
Dept: SURGERY | Facility: OTHER | Age: 45
End: 2025-05-30
Payer: COMMERCIAL

## 2025-06-02 NOTE — ANESTHESIA PREPROCEDURE EVALUATION
06/02/2025  Wendy Munoz is a 45 y.o., female.      Pre-op Assessment    I have reviewed the Patient Summary Reports.     I have reviewed the Nursing Notes. I have reviewed the NPO Status.   I have reviewed the Medications.     Review of Systems  Anesthesia Hx:  No problems with previous Anesthesia                Social:  Non-Smoker       Hematology/Oncology:                        --  Cancer in past history (Basal cell carcinoma):                     EENT/Dental:  EENT/Dental Normal           Cardiovascular:  Exercise tolerance: good                                             Pulmonary:    Asthma                    Hepatic/GI:   PUD,                  Neurological:    Neuromuscular Disease,  Headaches                                 Endocrine:  Endocrine Normal            Psych:  Psychiatric Normal                    Physical Exam  General: Well nourished, Cooperative and Alert    Airway:  Mallampati: II   Mouth Opening: Normal  TM Distance: Normal  Tongue: Normal  Neck ROM: Normal ROM    Dental:  Intact        Anesthesia Plan  Type of Anesthesia, risks & benefits discussed:    Anesthesia Type: Gen ETT  Intra-op Monitoring Plan: Standard ASA Monitors  Post Op Pain Control Plan: multimodal analgesia  Induction:  IV  Airway Plan: Video, Post-Induction  ASA Score: 2  Day of Surgery Review of History & Physical: H&P Update referred to the surgeon/provider.  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     .

## 2025-06-04 ENCOUNTER — OFFICE VISIT (OUTPATIENT)
Dept: DERMATOLOGY | Facility: CLINIC | Age: 45
End: 2025-06-04
Payer: COMMERCIAL

## 2025-06-04 ENCOUNTER — PATIENT MESSAGE (OUTPATIENT)
Dept: HEMATOLOGY/ONCOLOGY | Facility: CLINIC | Age: 45
End: 2025-06-04
Payer: COMMERCIAL

## 2025-06-04 DIAGNOSIS — D22.9 MULTIPLE BENIGN NEVI: ICD-10-CM

## 2025-06-04 DIAGNOSIS — Z85.828 HISTORY OF SKIN CANCER: Primary | ICD-10-CM

## 2025-06-04 DIAGNOSIS — Z12.83 SKIN CANCER SCREENING: ICD-10-CM

## 2025-06-04 DIAGNOSIS — L82.1 SK (SEBORRHEIC KERATOSIS): ICD-10-CM

## 2025-06-04 DIAGNOSIS — I78.1 TELANGIECTASIA: ICD-10-CM

## 2025-06-04 PROCEDURE — 99999 PR PBB SHADOW E&M-EST. PATIENT-LVL III: CPT | Mod: PBBFAC,,, | Performed by: DERMATOLOGY

## 2025-06-05 ENCOUNTER — TELEPHONE (OUTPATIENT)
Dept: UROGYNECOLOGY | Facility: CLINIC | Age: 45
End: 2025-06-05
Payer: COMMERCIAL

## 2025-06-06 ENCOUNTER — OFFICE VISIT (OUTPATIENT)
Dept: UROGYNECOLOGY | Facility: CLINIC | Age: 45
End: 2025-06-06
Payer: COMMERCIAL

## 2025-06-06 ENCOUNTER — HOSPITAL ENCOUNTER (OUTPATIENT)
Dept: PREADMISSION TESTING | Facility: OTHER | Age: 45
Discharge: HOME | End: 2025-06-06
Attending: OBSTETRICS & GYNECOLOGY
Payer: COMMERCIAL

## 2025-06-06 VITALS
SYSTOLIC BLOOD PRESSURE: 130 MMHG | DIASTOLIC BLOOD PRESSURE: 74 MMHG | WEIGHT: 160 LBS | TEMPERATURE: 99 F | HEART RATE: 73 BPM | OXYGEN SATURATION: 99 % | HEIGHT: 69 IN | BODY MASS INDEX: 23.7 KG/M2

## 2025-06-06 VITALS
DIASTOLIC BLOOD PRESSURE: 60 MMHG | HEIGHT: 69 IN | BODY MASS INDEX: 23.51 KG/M2 | SYSTOLIC BLOOD PRESSURE: 120 MMHG | HEART RATE: 73 BPM | WEIGHT: 158.75 LBS

## 2025-06-06 DIAGNOSIS — Z01.818 PREOP TESTING: Primary | ICD-10-CM

## 2025-06-06 DIAGNOSIS — N81.6 RECTOCELE, FEMALE: ICD-10-CM

## 2025-06-06 DIAGNOSIS — Z01.818 PREOPERATIVE EXAM FOR GYNECOLOGIC SURGERY: Primary | ICD-10-CM

## 2025-06-06 DIAGNOSIS — N39.3 URINARY, INCONTINENCE, STRESS FEMALE: ICD-10-CM

## 2025-06-06 DIAGNOSIS — N81.4 UTEROVAGINAL PROLAPSE: ICD-10-CM

## 2025-06-06 LAB — ABORH RETYPE: NORMAL

## 2025-06-06 PROCEDURE — 99999 PR PBB SHADOW E&M-EST. PATIENT-LVL III: CPT | Mod: PBBFAC,,, | Performed by: NURSE PRACTITIONER

## 2025-06-06 RX ORDER — MAGNESIUM OXIDE/MAG AA CHELATE 300 MG
CAPSULE ORAL
COMMUNITY
End: 2025-06-06 | Stop reason: CLARIF

## 2025-06-06 NOTE — DISCHARGE INSTRUCTIONS
Information to Prepare you for your Surgery    PRE-ADMIT TESTING -  [phone]    [address] [facility]          Your surgery has been scheduled at [facility]. We are pleased to have the opportunity to serve you. For Further Information please call [phone].    On the day of surgery please report to the Information Desk on the 1st floor.    CONTACT YOUR PHYSICIAN'S OFFICE THE DAY PRIOR TO YOUR SURGERY TO OBTAIN YOUR ARRIVAL TIME.     The evening before surgery do not eat anything after 9 p.m. ( this includes hard candy, chewing gum and mints).  You may only have GATORADE, POWERADE AND WATER  from 9 p.m. until you leave your home.   DO NOT DRINK ANY LIQUIDS ON THE WAY TO THE HOSPITAL.      Why does your anesthesiologist allow you to drink Gatorade/Powerade before surgery?  Gatorade/Powerade helps to increase your comfort before surgery and to decrease your nausea after surgery. The carbohydrates in Gatorade/Powerade help reduce your body's stress response to surgery.  If you are a diabetic-drink only water prior to surgery.    Outpatient Surgery- May allow 2 adult (18 and older) Support Persons (1 being the designated ) for all surgical/procedural patients. A breastfeeding mother will be allowed her infant and 2 adult Support Persons. No one under the age of 18 will be allowed in the building.       SPECIAL MEDICATION INSTRUCTIONS: TAKE medications checked off by the Anesthesiologist on your Medication List.      Angiogram Patients: Take medications as instructed by your physician, including aspirin.       Surgery Patients:    If you take ASPIRIN - Your PHYSICIAN/SURGEON will need to inform you IF/OR when you need to stop taking aspirin prior to your surgery.           The week prior to surgery do not ot take any medications containing IBUPROFEN or NSAIDS ( Advil, Motrin, Goodys, BC, Aleve, Naproxen etc)         If you are not sure if you should  take a medicine please call your surgeon's office.        Ok to take Tylenol    Do Not Wear any make-up (especially eye make-up) to surgery. Please remove any false eyelashes or eyelash extensions. If you arrive the day of surgery with makeup/eyelashes on you will be required to remove prior to surgery. (There is a risk of corneal abrasions if eye makeup/eyelash extensions are not removed)      Leave all valuables at home. Do Not wear any jewelry or watches, including any metal in body piercings. Jewelry must be removed prior to coming to the hospital.  There is a possibility that rings that are unable to be removed may be cut off if they are on the surgical extremity.    Please remove all hair extensions, wigs, clips and any other metal accessories/ ornaments from your hair.  These items may pose a flammable/fire risk in Surgery and must be removed.    Do not shave your surgical area at least 5 days prior to your surgery. The surgical prep will be performed at the hospital according to Infection Control regulations.    Contact Lens must be removed before surgery. Either do not wear the contact lens or bring a case and solution for storage.        Please bring a container for eyeglasses or dentures as required.        Bring any paperwork your physician has provided, such as consent forms,        history and physicals, doctor's orders, etc.         Bring comfortable clothes that are loose fitting to wear upon discharge. Take into consideration the type of surgery being performed.        Maintain your diet as advised per your physician the day prior to surgery.    Park in the Parking lot behind the hospital or in the [place] [facility] across the street from the parking lot. Parking is complimentary.        If you will be discharged the same day as your procedure, please arrange for a responsible adult to drive you home or to accompany you if traveling by taxi.         YOU WILL NOT BE PERMITTED TO DRIVE OR TO  LEAVE THE HOSPITAL ALONE AFTER SURGERY.         If you are being discharged the same day, it is strongly recommended that you arrange for someone to remain with you for the first 24 hrs following your surgery.    The Surgeon will speak to your family/visitor after your surgery regarding the outcome of your surgery and post op care.  The Surgeon may speak to you after your surgery, but there is a possibility you may not remember the details.  Please check with your family members regarding the conversation with the Surgeon.    We strongly recommend whoever is bringing you home be present for discharge instructions.  This will ensure a thorough understanding for your post op home care.    If the patient has fever, cough, or signs/symptoms of Flu or Covid please do not come in for your surgery. Contact your surgeon and your primary care physician for further instructions.             Bathing Instructions with Hibiclens    Shower the evening before and morning of your procedure with Chlorhexidine (Hibiclens)  do not use Chlorhexidine on your face or genitals. Do not get in your eyes.  Wash your face with water and your regular face wash/soap  Use your regular shampoo  Apply Chlorhexidine (Hibiclens) directly on your skin or on a wet washcloth and wash gently. When showering: Move away from the shower stream when applying Chlorhexidine (Hibiclens) to avoid rinsing off too soon.  Rinse thoroughly with warm water  Do not dilute Chlorhexidine (Hibiclens)   Dry off as usual, do not use any deodorant, powder, body lotions, perfume, after shave or cologne.

## 2025-06-06 NOTE — H&P (VIEW-ONLY)
Urogyn follow up  06/06/2025  .  [facility] - UROGYNECOLOGY  [address] [id number]    Wendy Munoz  [id number]  1980      Wendy Munoz is a 45 y.o.  here for a urogyn preop visit    Last HPI from 03/12/2025  1)  UI:  (+) ISIS. (--) UUI.(--) pads. Daytime frequency: Q 3 hours.  Nocturia: Yes: 1/night.   (--) dysuria,  (--) hematuria,  (--) frequent UTIs.  (+) complete bladder emptying.     2)  POP:  Absent. (--) vaginal discharge. (+) sexually active.  (--) dyspareunia.  (--)  Vaginal dryness.  (--) vaginal estrogen use.      3)  BM:  (+) constipation/straining, sometimes but taking FeSO4.  (--) chronic diarrhea. (--) hematochezia.  (--) fecal incontinence.  (--) fecal smearing/urgency.  (--) complete evacuation.  Thinks about splinting--rafi when hard.      4)  menorrhagia:  11/2024:  Uterus:  Measures 7.4 x 3.7 x 6.4 cm in dimensions.  No discrete uterine fibroids identified.  The endometrium is normal thickness measuring 10 mm.  Small isoechoic mass noted within the endometrium measuring 1.6 x 1.2 x 0.8 cm, suggestive of a polyp.  Additional focal area of thickening noted in the endocervical canal measuring 0.8 x 0.6 x 0.5 cm.  Ovaries:  The ovaries are normal in size.  The right ovary measures 2.0 x 2.2 x 2.2 cm.  The left ovary measures 3.1 x 1.8 x 2.6 cm. Ovarian follicles noted.  Doppler flow demonstrated to both ovaries.  Free Fluid:  None.  Impression:  Endometrium normal thickness (10 mm).  However, there is a small isoechoic mass within the endometrium, as above, possible polyp.  Additional indeterminate 8 mm focal area of thickening in the endocervical canal.  --2023 L adnexal cyst 2023     --EMBx 2024 secretory, benign    Changes from last visit:  +IISS with jumping    Past Medical History:   Diagnosis Date    Acute right hip pain 11/30/2022    Asthma 2001    Exercise induced    Basal cell carcinoma     Dysmenorrhea 2016    History of COVID-19 08/2021    Joint  pain     Migraine     PONV (postoperative nausea and vomiting)     X 1 ONLY    PUD (peptic ulcer disease)     Right hip pain     Sesamoiditis of right foot     Ulcer        Past Surgical History:   Procedure Laterality Date    ABDOMINAL SURGERY  9/3015    Umbilical, bilateral inguinal  hernia repair    HERNIA REPAIR      umbilical, inguinal hernia repair    HYSTEROSCOPY W/ POLYPECTOMY      PELVIC LAPAROSCOPY  12/2016    IUD removal    REMOVAL OF INTRAUTERINE DEVICE (IUD)      laparoscopy for perforated IUD    SKIN BIOPSY         Family History   Problem Relation Name Age of Onset    Breast cancer Mother Evonne Hawkins 50        No genetic testing    Hypertension Mother Evonne Hawkins     Hypertension Father Wilmer Hawkins     Prostate cancer Father Wilmer Hawkins 50    Hyperlipidemia Father Wilmer Hawkins     Gout Father Wilmer Hawkins     Melanoma Neg Hx      Cataracts Neg Hx      Glaucoma Neg Hx      Macular degeneration Neg Hx         Social History     Socioeconomic History    Marital status:    Tobacco Use    Smoking status: Never     Passive exposure: Never    Smokeless tobacco: Never   Vaping Use    Vaping status: Never Used   Substance and Sexual Activity    Alcohol use: Yes     Alcohol/week: 1.0 standard drink of alcohol     Types: 1 Glasses of wine per week     Comment: few times a week    Drug use: Never    Sexual activity: Yes     Partners: Male     Birth control/protection: Partner-Vasectomy   Other Topics Concern    Are you pregnant or think you may be? No    Breast-feeding No     Social Drivers of Health     Financial Resource Strain: Low Risk  (5/12/2025)    Overall Financial Resource Strain (CARDIA)     Difficulty of Paying Living Expenses: Not hard at all   Food Insecurity: No Food Insecurity (5/12/2025)    Hunger Vital Sign     Worried About Running Out of Food in the Last Year: Never true     Ran Out of Food in the Last Year: Never true   Transportation Needs: No Transportation Needs (5/12/2025)     "PRAPARE - Transportation     Lack of Transportation (Medical): No     Lack of Transportation (Non-Medical): No   Physical Activity: Sufficiently Active (5/12/2025)    Exercise Vital Sign     Days of Exercise per Week: 4 days     Minutes of Exercise per Session: 40 min   Stress: No Stress Concern Present (5/12/2025)    [employer] - Occupational Stress Questionnaire     Feeling of Stress : Only a little   Housing Stability: Low Risk  (5/12/2025)    Housing Stability Vital Sign     Unable to Pay for Housing in the Last Year: No     Number of Times Moved in the Last Year: 0     Homeless in the Last Year: No       Current Medications[1]    Review of patient's allergies indicates:   Allergen Reactions    Lortab  [hydrocodone-acetaminophen] Nausea And Vomiting     VOMITING    Latex, natural rubber      Rash/itching    Topamax [topiramate]      Gout       Well woman:  Pap test: 10/2023, HPV neg.  History of abnormal paps: Yes - remote--resolved.  History of STIs:  No,   Mammogram: Date of last: 2024.  Result: Normal. Results from today pending. Elevated TC.  Has to start tamoxifen.   Colonoscopy: Date of last: 2025.  Result:  normal.  Repeat due:  2030.    DEXA:deferred    ROS:  As per HPI.      Exam  /60 (BP Location: Right arm, Patient Position: Sitting)   Pulse 73   Ht 5' 9" (1.753 m)   Wt 72 kg (158 lb 11.7 oz)   LMP 06/06/2025 (Exact Date)   BMI 23.44 kg/m²   General: alert and oriented, no acute distress  Respiratory: normal respiratory effort  Abd: soft, non-tender, non-distended    Pelvic--deferred    The patient was placed in dorsal lithotomy position with feet in stirrups.  The urethra was prepped x 2 with betadine.  A red rubber catheter was used to drain the bladder.  The bladder was filled with 400 mL sterile water to gravity with a 60 mL christina tipped syringe, at which point she voiced a strong desire to void.  The catheter was removed. She was asked to cough/ " valsalva with prolapse reduction with (--) leak. She as then asked to cough/valsalva with prolapse with (+) leak.   She tolerated the procedure well.    PVR 10  First sensation 40 mL  Normal first desire 250 mL  Strong desire  320 mL   Urgent desire 400 mL    Impression  (+) ISIS      Impression  1. Preoperative exam for gynecologic surgery        2. Uterovaginal prolapse        3. Rectocele, female        4. Urinary, incontinence, stress female          We reviewed the above issues and discussed options for short-term versus long-term management of her problems.   Plan:     Patient consented for VNOTES hysterectomy, possible laparoscopy, removal of tubes, possible removal of ovaries, uterosacral suspension, possible sacrospinous fixation, possible anterior/posterior repair with perineorrhaphy, possible laparotomy, placement of synthetic midurethral sling, and cystourethroscopy.   R/B/A reviewed. Specific risks reviewed include:  infection, bleeding, need for blood transfusion, damage to surrounding structures, anesthesia risks, death, heart attack, stroke, mesh erosion/extrusion, pain, dyspareunia, urinary retention, voiding dysfunction, urinary incontinence, exacerbation of urinary urge incontinence, and need for further surgeries.  We reviewed potential for failure of POP defect repair and need for future surgery, with no way of predicting risk.  She understands success rate of ASC approaches 85%.  Success rate of midurethral sling for ISIS was reviewed as 80-85%, and she understands that this will not necessarily impact other types of urinary incontinence.  Alternatives reviewed include: pessary/PT for POP and pessary/periurethral injections/PT/medication for ISIS.    T&S, urine HCG on DOS  Preoperative appointment with PCP or cardiology: Yes - Date: cleared  VTE Prophylaxis:  heparin 5000 u SQ TID (1st dose 2hrs preop) + SCDs  Patient instructed on Magnesium citrate and chlorahexadine/dial soap prep to perform day  before & AM of surgery.   Proceed to OR for above-mentioned procedure.    20 minutes were spent in face to face time with this patient  100 % of this time was spent in counseling and/or coordination of care      FRED Caban-BC [facility]  Division of Female Pelvic Medicine and Reconstructive Surgery  Department of Obstetrics & Gynecology         [1]   Current Outpatient Medications   Medication Sig Dispense Refill    fish oil-omega-3 fatty acids 300-1,000 mg capsule Take by mouth once daily.      gabapentin (NEURONTIN) 100 MG capsule Take 1 capsule (100 mg total) by mouth 3 (three) times daily. (Patient taking differently: Take 100 mg by mouth 3 (three) times daily. ONLY 100MG PO HS) 90 capsule 11    magnesium 250 mg Tab Take 400 mg by mouth once.      multivitamin (THERAGRAN) per tablet Take 1 tablet by mouth once daily.      rimegepant (NURTEC) 75 mg odt Take 1 tablet (75 mg total) by mouth as needed for Migraine. Place ODT tablet on the tongue; alternatively the ODT tablet may be placed under the tongue; MAX 1 TAB/24 HOURS 8 tablet 11    tiZANidine (ZANAFLEX) 2 MG tablet Take 1-2 tablets (2-4 mg total) by mouth 3 (three) times daily. 180 tablet 11    tretinoin (RETIN-A) 0.025 % cream Apply small amount to entire face every night at bedtime. Wash off every morning and apply sunscreen. 45 g 3     Current Facility-Administered Medications   Medication Dose Route Frequency Provider Last Rate Last Admin    albuterol inhaler 2 puff  2 puff Inhalation Q20 Min TESSAN Yasir Callahan MD

## 2025-06-18 RX ORDER — TIZANIDINE 2 MG/1
2-4 TABLET ORAL 3 TIMES DAILY
Qty: 180 TABLET | Refills: 11 | Status: SHIPPED | OUTPATIENT
Start: 2025-06-18

## 2025-06-18 RX ORDER — GABAPENTIN 100 MG/1
100 CAPSULE ORAL 3 TIMES DAILY
Qty: 90 CAPSULE | Refills: 11 | Status: SHIPPED | OUTPATIENT
Start: 2025-06-18 | End: 2026-06-18

## 2025-06-24 ENCOUNTER — PATIENT MESSAGE (OUTPATIENT)
Dept: UROGYNECOLOGY | Facility: CLINIC | Age: 45
End: 2025-06-24
Payer: COMMERCIAL

## 2025-06-25 ENCOUNTER — TELEPHONE (OUTPATIENT)
Dept: UROGYNECOLOGY | Facility: CLINIC | Age: 45
End: 2025-06-25
Payer: COMMERCIAL

## 2025-06-25 NOTE — TELEPHONE ENCOUNTER
Notified pt to arrive at 9am tomorrow for surgery. Pt repeated time back to me & verbalized understanding.

## 2025-06-26 ENCOUNTER — HOSPITAL ENCOUNTER (OUTPATIENT)
Facility: OTHER | Age: 45
Discharge: HOME | End: 2025-06-27
Attending: OBSTETRICS & GYNECOLOGY | Admitting: OBSTETRICS & GYNECOLOGY
Payer: COMMERCIAL

## 2025-06-26 ENCOUNTER — ANESTHESIA (OUTPATIENT)
Dept: SURGERY | Facility: OTHER | Age: 45
End: 2025-06-26
Payer: COMMERCIAL

## 2025-06-26 DIAGNOSIS — D64.9 LOW HEMOGLOBIN: ICD-10-CM

## 2025-06-26 DIAGNOSIS — N39.46 MIXED STRESS AND URGE URINARY INCONTINENCE: ICD-10-CM

## 2025-06-26 DIAGNOSIS — N81.4 UTEROVAGINAL PROLAPSE: ICD-10-CM

## 2025-06-26 DIAGNOSIS — K59.09 CHRONIC CONSTIPATION: ICD-10-CM

## 2025-06-26 DIAGNOSIS — Z90.710 S/P HYSTERECTOMY: Primary | ICD-10-CM

## 2025-06-26 DIAGNOSIS — N81.6 RECTOCELE, FEMALE: ICD-10-CM

## 2025-06-26 DIAGNOSIS — N39.3 URINARY, INCONTINENCE, STRESS FEMALE: ICD-10-CM

## 2025-06-26 LAB
B-HCG UR QL: NEGATIVE
CTP QC/QA: YES
INDIRECT COOMBS: NORMAL
POCT GLUCOSE: 74 MG/DL (ref 70–110)
RH BLD: NORMAL
SPECIMEN OUTDATE: NORMAL

## 2025-06-26 PROCEDURE — 99900035 HC TECH TIME PER 15 MIN (STAT)

## 2025-06-26 PROCEDURE — 63600175 PHARM REV CODE 636 W HCPCS

## 2025-06-26 PROCEDURE — 63600175 PHARM REV CODE 636 W HCPCS: Performed by: NURSE ANESTHETIST, CERTIFIED REGISTERED

## 2025-06-26 PROCEDURE — 88307 TISSUE EXAM BY PATHOLOGIST: CPT | Mod: 26,,, | Performed by: PATHOLOGY

## 2025-06-26 PROCEDURE — 81025 URINE PREGNANCY TEST: CPT | Performed by: OBSTETRICS & GYNECOLOGY

## 2025-06-26 PROCEDURE — 63600175 PHARM REV CODE 636 W HCPCS: Performed by: OBSTETRICS & GYNECOLOGY

## 2025-06-26 PROCEDURE — 57260 CMBN ANT PST COLPRHY: CPT | Mod: AS,51,,

## 2025-06-26 PROCEDURE — 58262 VAG HYST INCLUDING T/O: CPT | Mod: AS,,,

## 2025-06-26 PROCEDURE — 63600175 PHARM REV CODE 636 W HCPCS: Mod: JZ,TB | Performed by: ANESTHESIOLOGY

## 2025-06-26 PROCEDURE — 25000003 PHARM REV CODE 250: Performed by: NURSE ANESTHETIST, CERTIFIED REGISTERED

## 2025-06-26 PROCEDURE — 63600175 PHARM REV CODE 636 W HCPCS: Performed by: ANESTHESIOLOGY

## 2025-06-26 PROCEDURE — 82962 GLUCOSE BLOOD TEST: CPT | Performed by: OBSTETRICS & GYNECOLOGY

## 2025-06-26 PROCEDURE — 37000009 HC ANESTHESIA EA ADD 15 MINS: Performed by: OBSTETRICS & GYNECOLOGY

## 2025-06-26 PROCEDURE — 86901 BLOOD TYPING SEROLOGIC RH(D): CPT | Performed by: OBSTETRICS & GYNECOLOGY

## 2025-06-26 PROCEDURE — 25000003 PHARM REV CODE 250: Performed by: ANESTHESIOLOGY

## 2025-06-26 PROCEDURE — 57288 REPAIR BLADDER DEFECT: CPT | Mod: AS,51,,

## 2025-06-26 PROCEDURE — 25000003 PHARM REV CODE 250

## 2025-06-26 PROCEDURE — 36000711: Performed by: OBSTETRICS & GYNECOLOGY

## 2025-06-26 PROCEDURE — 88302 TISSUE EXAM BY PATHOLOGIST: CPT | Mod: TC,59 | Performed by: OBSTETRICS & GYNECOLOGY

## 2025-06-26 PROCEDURE — 57283 COLPOPEXY INTRAPERITONEAL: CPT | Mod: XS,,, | Performed by: OBSTETRICS & GYNECOLOGY

## 2025-06-26 PROCEDURE — C1771 REP DEV, URINARY, W/SLING: HCPCS | Performed by: OBSTETRICS & GYNECOLOGY

## 2025-06-26 PROCEDURE — 27201423 OPTIME MED/SURG SUP & DEVICES STERILE SUPPLY: Performed by: OBSTETRICS & GYNECOLOGY

## 2025-06-26 PROCEDURE — 71000033 HC RECOVERY, INTIAL HOUR: Performed by: OBSTETRICS & GYNECOLOGY

## 2025-06-26 PROCEDURE — 57288 REPAIR BLADDER DEFECT: CPT | Mod: 51,,, | Performed by: OBSTETRICS & GYNECOLOGY

## 2025-06-26 PROCEDURE — P9045 ALBUMIN (HUMAN), 5%, 250 ML: HCPCS | Mod: JZ,TB | Performed by: NURSE ANESTHETIST, CERTIFIED REGISTERED

## 2025-06-26 PROCEDURE — 57283 COLPOPEXY INTRAPERITONEAL: CPT | Mod: AS,XS,,

## 2025-06-26 PROCEDURE — 36415 COLL VENOUS BLD VENIPUNCTURE: CPT

## 2025-06-26 PROCEDURE — 36000710: Performed by: OBSTETRICS & GYNECOLOGY

## 2025-06-26 PROCEDURE — 37000008 HC ANESTHESIA 1ST 15 MINUTES: Performed by: OBSTETRICS & GYNECOLOGY

## 2025-06-26 PROCEDURE — 57260 CMBN ANT PST COLPRHY: CPT | Mod: 51,,, | Performed by: OBSTETRICS & GYNECOLOGY

## 2025-06-26 PROCEDURE — 25000003 PHARM REV CODE 250: Performed by: OBSTETRICS & GYNECOLOGY

## 2025-06-26 PROCEDURE — 88302 TISSUE EXAM BY PATHOLOGIST: CPT | Mod: 26,,, | Performed by: PATHOLOGY

## 2025-06-26 PROCEDURE — 71000039 HC RECOVERY, EACH ADD'L HOUR: Performed by: OBSTETRICS & GYNECOLOGY

## 2025-06-26 PROCEDURE — 58262 VAG HYST INCLUDING T/O: CPT | Mod: ,,, | Performed by: OBSTETRICS & GYNECOLOGY

## 2025-06-26 DEVICE — TRANSVAGINAL MID-URETHRAL SLING
Type: IMPLANTABLE DEVICE | Site: PELVIS | Status: FUNCTIONAL
Brand: ADVANTAGE FIT™  SYSTEM

## 2025-06-26 RX ORDER — ONDANSETRON HYDROCHLORIDE 2 MG/ML
4 INJECTION, SOLUTION INTRAVENOUS EVERY 12 HOURS PRN
Status: DISCONTINUED | OUTPATIENT
Start: 2025-06-26 | End: 2025-06-26

## 2025-06-26 RX ORDER — BUPIVACAINE HYDROCHLORIDE 5 MG/ML
INJECTION, SOLUTION EPIDURAL; INTRACAUDAL; PERINEURAL
Status: DISCONTINUED | OUTPATIENT
Start: 2025-06-26 | End: 2025-06-26 | Stop reason: HOSPADM

## 2025-06-26 RX ORDER — MUPIROCIN 20 MG/G
OINTMENT TOPICAL 2 TIMES DAILY
Status: DISCONTINUED | OUTPATIENT
Start: 2025-06-26 | End: 2025-06-27 | Stop reason: HOSPADM

## 2025-06-26 RX ORDER — SODIUM CHLORIDE, SODIUM LACTATE, POTASSIUM CHLORIDE, CALCIUM CHLORIDE 600; 310; 30; 20 MG/100ML; MG/100ML; MG/100ML; MG/100ML
INJECTION, SOLUTION INTRAVENOUS CONTINUOUS
Status: DISCONTINUED | OUTPATIENT
Start: 2025-06-26 | End: 2025-06-26

## 2025-06-26 RX ORDER — ALBUMIN HUMAN 50 G/1000ML
SOLUTION INTRAVENOUS
Status: DISCONTINUED | OUTPATIENT
Start: 2025-06-26 | End: 2025-06-26

## 2025-06-26 RX ORDER — PROCHLORPERAZINE EDISYLATE 5 MG/ML
5 INJECTION INTRAMUSCULAR; INTRAVENOUS EVERY 6 HOURS PRN
Status: DISCONTINUED | OUTPATIENT
Start: 2025-06-26 | End: 2025-06-26

## 2025-06-26 RX ORDER — LIDOCAINE HYDROCHLORIDE AND EPINEPHRINE 10; 10 UG/ML; MG/ML
INJECTION, SOLUTION INFILTRATION; PERINEURAL
Status: DISCONTINUED | OUTPATIENT
Start: 2025-06-26 | End: 2025-06-26 | Stop reason: HOSPADM

## 2025-06-26 RX ORDER — LEVALBUTEROL INHALATION SOLUTION 0.63 MG/3ML
0.63 SOLUTION RESPIRATORY (INHALATION) EVERY 8 HOURS
Status: DISCONTINUED | OUTPATIENT
Start: 2025-06-26 | End: 2025-06-27

## 2025-06-26 RX ORDER — GABAPENTIN 100 MG/1
100 CAPSULE ORAL 3 TIMES DAILY
Status: DISCONTINUED | OUTPATIENT
Start: 2025-06-26 | End: 2025-06-27 | Stop reason: HOSPADM

## 2025-06-26 RX ORDER — DOCUSATE SODIUM 100 MG/1
100 CAPSULE, LIQUID FILLED ORAL 2 TIMES DAILY
Qty: 60 CAPSULE | Refills: 1 | Status: SHIPPED | OUTPATIENT
Start: 2025-06-26 | End: 2025-07-26

## 2025-06-26 RX ORDER — LIDOCAINE HYDROCHLORIDE 20 MG/ML
INJECTION INTRAVENOUS
Status: DISCONTINUED | OUTPATIENT
Start: 2025-06-26 | End: 2025-06-26

## 2025-06-26 RX ORDER — ONDANSETRON HYDROCHLORIDE 2 MG/ML
INJECTION, SOLUTION INTRAVENOUS
Status: DISCONTINUED | OUTPATIENT
Start: 2025-06-26 | End: 2025-06-26

## 2025-06-26 RX ORDER — MUPIROCIN 20 MG/G
OINTMENT TOPICAL
Status: DISCONTINUED | OUTPATIENT
Start: 2025-06-26 | End: 2025-06-26 | Stop reason: HOSPADM

## 2025-06-26 RX ORDER — TIZANIDINE 4 MG/1
4 TABLET ORAL EVERY 8 HOURS PRN
Status: DISCONTINUED | OUTPATIENT
Start: 2025-06-26 | End: 2025-06-27 | Stop reason: HOSPADM

## 2025-06-26 RX ORDER — IBUPROFEN 400 MG/1
800 TABLET, FILM COATED ORAL 3 TIMES DAILY
Status: DISCONTINUED | OUTPATIENT
Start: 2025-06-26 | End: 2025-06-27 | Stop reason: HOSPADM

## 2025-06-26 RX ORDER — ACETAMINOPHEN 325 MG/1
650 TABLET ORAL
Status: DISCONTINUED | OUTPATIENT
Start: 2025-06-26 | End: 2025-06-26

## 2025-06-26 RX ORDER — LIDOCAINE HYDROCHLORIDE 10 MG/ML
0.5 INJECTION, SOLUTION EPIDURAL; INFILTRATION; INTRACAUDAL; PERINEURAL ONCE
Status: DISCONTINUED | OUTPATIENT
Start: 2025-06-26 | End: 2025-06-26 | Stop reason: HOSPADM

## 2025-06-26 RX ORDER — ONDANSETRON 8 MG/1
8 TABLET, ORALLY DISINTEGRATING ORAL EVERY 8 HOURS PRN
Status: DISCONTINUED | OUTPATIENT
Start: 2025-06-26 | End: 2025-06-27 | Stop reason: HOSPADM

## 2025-06-26 RX ORDER — OXYCODONE HYDROCHLORIDE 5 MG/1
5 TABLET ORAL EVERY 4 HOURS PRN
Qty: 25 TABLET | Refills: 0 | Status: SHIPPED | OUTPATIENT
Start: 2025-06-26

## 2025-06-26 RX ORDER — ROCURONIUM BROMIDE 10 MG/ML
INJECTION, SOLUTION INTRAVENOUS
Status: DISCONTINUED | OUTPATIENT
Start: 2025-06-26 | End: 2025-06-26

## 2025-06-26 RX ORDER — HYDROMORPHONE HYDROCHLORIDE 2 MG/ML
0.4 INJECTION, SOLUTION INTRAMUSCULAR; INTRAVENOUS; SUBCUTANEOUS
Status: DISCONTINUED | OUTPATIENT
Start: 2025-06-26 | End: 2025-06-27 | Stop reason: HOSPADM

## 2025-06-26 RX ORDER — HYDROMORPHONE HYDROCHLORIDE 2 MG/ML
0.5 INJECTION, SOLUTION INTRAMUSCULAR; INTRAVENOUS; SUBCUTANEOUS
Refills: 0 | Status: DISCONTINUED | OUTPATIENT
Start: 2025-06-26 | End: 2025-06-26

## 2025-06-26 RX ORDER — DEXAMETHASONE SODIUM PHOSPHATE 4 MG/ML
INJECTION, SOLUTION INTRA-ARTICULAR; INTRALESIONAL; INTRAMUSCULAR; INTRAVENOUS; SOFT TISSUE
Status: DISCONTINUED | OUTPATIENT
Start: 2025-06-26 | End: 2025-06-26

## 2025-06-26 RX ORDER — DOCUSATE SODIUM 100 MG/1
100 CAPSULE, LIQUID FILLED ORAL 2 TIMES DAILY
Status: DISCONTINUED | OUTPATIENT
Start: 2025-06-26 | End: 2025-06-27 | Stop reason: HOSPADM

## 2025-06-26 RX ORDER — NALOXONE HCL 0.4 MG/ML
0.02 VIAL (ML) INJECTION
Status: DISCONTINUED | OUTPATIENT
Start: 2025-06-26 | End: 2025-06-27 | Stop reason: HOSPADM

## 2025-06-26 RX ORDER — ACETAMINOPHEN 500 MG
1000 TABLET ORAL EVERY 6 HOURS
Status: DISCONTINUED | OUTPATIENT
Start: 2025-06-27 | End: 2025-06-27 | Stop reason: HOSPADM

## 2025-06-26 RX ORDER — HEPARIN SODIUM 5000 [USP'U]/ML
5000 INJECTION, SOLUTION INTRAVENOUS; SUBCUTANEOUS
Status: DISCONTINUED | OUTPATIENT
Start: 2025-06-26 | End: 2025-06-26 | Stop reason: HOSPADM

## 2025-06-26 RX ORDER — OXYCODONE HYDROCHLORIDE 5 MG/1
5 TABLET ORAL EVERY 4 HOURS PRN
Refills: 0 | Status: DISCONTINUED | OUTPATIENT
Start: 2025-06-26 | End: 2025-06-26

## 2025-06-26 RX ORDER — FAMOTIDINE 20 MG/1
20 TABLET, FILM COATED ORAL
Status: COMPLETED | OUTPATIENT
Start: 2025-06-26 | End: 2025-06-26

## 2025-06-26 RX ORDER — SODIUM CHLORIDE 9 MG/ML
INJECTION, SOLUTION INTRAVENOUS CONTINUOUS
Status: DISCONTINUED | OUTPATIENT
Start: 2025-06-26 | End: 2025-06-27 | Stop reason: HOSPADM

## 2025-06-26 RX ORDER — GLUCAGON 1 MG
1 KIT INJECTION
Status: DISCONTINUED | OUTPATIENT
Start: 2025-06-26 | End: 2025-06-26 | Stop reason: HOSPADM

## 2025-06-26 RX ORDER — CYCLOBENZAPRINE HCL 5 MG
10 TABLET ORAL ONCE
Status: COMPLETED | OUTPATIENT
Start: 2025-06-26 | End: 2025-06-26

## 2025-06-26 RX ORDER — PROCHLORPERAZINE EDISYLATE 5 MG/ML
5 INJECTION INTRAMUSCULAR; INTRAVENOUS EVERY 30 MIN PRN
Status: DISCONTINUED | OUTPATIENT
Start: 2025-06-26 | End: 2025-06-26 | Stop reason: HOSPADM

## 2025-06-26 RX ORDER — CEFAZOLIN 2 G/1
2 INJECTION, POWDER, FOR SOLUTION INTRAMUSCULAR; INTRAVENOUS
Status: DISCONTINUED | OUTPATIENT
Start: 2025-06-26 | End: 2025-06-26 | Stop reason: HOSPADM

## 2025-06-26 RX ORDER — OXYCODONE HYDROCHLORIDE 5 MG/1
5 TABLET ORAL EVERY 4 HOURS PRN
Status: DISCONTINUED | OUTPATIENT
Start: 2025-06-26 | End: 2025-06-27 | Stop reason: HOSPADM

## 2025-06-26 RX ORDER — KETOROLAC TROMETHAMINE 30 MG/ML
30 INJECTION, SOLUTION INTRAMUSCULAR; INTRAVENOUS ONCE
Status: COMPLETED | OUTPATIENT
Start: 2025-06-26 | End: 2025-06-26

## 2025-06-26 RX ORDER — OXYCODONE HYDROCHLORIDE 10 MG/1
10 TABLET ORAL EVERY 4 HOURS PRN
Refills: 0 | Status: DISCONTINUED | OUTPATIENT
Start: 2025-06-27 | End: 2025-06-27 | Stop reason: HOSPADM

## 2025-06-26 RX ORDER — PHENYLEPHRINE HYDROCHLORIDE 10 MG/ML
INJECTION INTRAVENOUS
Status: DISCONTINUED | OUTPATIENT
Start: 2025-06-26 | End: 2025-06-26

## 2025-06-26 RX ORDER — PROPOFOL 10 MG/ML
VIAL (ML) INTRAVENOUS
Status: DISCONTINUED | OUTPATIENT
Start: 2025-06-26 | End: 2025-06-26

## 2025-06-26 RX ORDER — OXYCODONE HYDROCHLORIDE 5 MG/1
5 TABLET ORAL
Status: DISCONTINUED | OUTPATIENT
Start: 2025-06-26 | End: 2025-06-26 | Stop reason: HOSPADM

## 2025-06-26 RX ORDER — KETAMINE HCL IN 0.9 % NACL 50 MG/5 ML
SYRINGE (ML) INTRAVENOUS
Status: DISCONTINUED | OUTPATIENT
Start: 2025-06-26 | End: 2025-06-26

## 2025-06-26 RX ORDER — MIDAZOLAM HYDROCHLORIDE 1 MG/ML
INJECTION INTRAMUSCULAR; INTRAVENOUS
Status: DISCONTINUED | OUTPATIENT
Start: 2025-06-26 | End: 2025-06-26

## 2025-06-26 RX ORDER — PANTOPRAZOLE SODIUM 40 MG/1
40 TABLET, DELAYED RELEASE ORAL DAILY
Status: DISCONTINUED | OUTPATIENT
Start: 2025-06-26 | End: 2025-06-27 | Stop reason: HOSPADM

## 2025-06-26 RX ORDER — FENTANYL CITRATE 50 UG/ML
INJECTION, SOLUTION INTRAMUSCULAR; INTRAVENOUS
Status: DISCONTINUED | OUTPATIENT
Start: 2025-06-26 | End: 2025-06-26

## 2025-06-26 RX ORDER — HYDROMORPHONE HYDROCHLORIDE 2 MG/ML
0.4 INJECTION, SOLUTION INTRAMUSCULAR; INTRAVENOUS; SUBCUTANEOUS EVERY 5 MIN PRN
Status: DISCONTINUED | OUTPATIENT
Start: 2025-06-26 | End: 2025-06-26 | Stop reason: HOSPADM

## 2025-06-26 RX ORDER — SODIUM CHLORIDE 0.9 % (FLUSH) 0.9 %
3 SYRINGE (ML) INJECTION
Status: DISCONTINUED | OUTPATIENT
Start: 2025-06-26 | End: 2025-06-26 | Stop reason: HOSPADM

## 2025-06-26 RX ORDER — METOCLOPRAMIDE HYDROCHLORIDE 5 MG/ML
5 INJECTION INTRAMUSCULAR; INTRAVENOUS EVERY 6 HOURS PRN
Status: DISCONTINUED | OUTPATIENT
Start: 2025-06-26 | End: 2025-06-27 | Stop reason: HOSPADM

## 2025-06-26 RX ORDER — ACETAMINOPHEN 500 MG
1000 TABLET ORAL
Status: COMPLETED | OUTPATIENT
Start: 2025-06-26 | End: 2025-06-26

## 2025-06-26 RX ORDER — IBUPROFEN 600 MG/1
600 TABLET, FILM COATED ORAL EVERY 6 HOURS PRN
Qty: 32 TABLET | Refills: 0 | Status: SHIPPED | OUTPATIENT
Start: 2025-06-26

## 2025-06-26 RX ORDER — PROPOFOL 10 MG/ML
VIAL (ML) INTRAVENOUS CONTINUOUS PRN
Status: DISCONTINUED | OUTPATIENT
Start: 2025-06-26 | End: 2025-06-26

## 2025-06-26 RX ADMIN — MIDAZOLAM HYDROCHLORIDE 2 MG: 1 INJECTION INTRAMUSCULAR; INTRAVENOUS at 11:06

## 2025-06-26 RX ADMIN — CEFAZOLIN 2 G: 2 INJECTION, POWDER, FOR SOLUTION INTRAMUSCULAR; INTRAVENOUS at 11:06

## 2025-06-26 RX ADMIN — HYDROMORPHONE HYDROCHLORIDE 0.4 MG: 2 INJECTION INTRAMUSCULAR; INTRAVENOUS; SUBCUTANEOUS at 05:06

## 2025-06-26 RX ADMIN — ROCURONIUM BROMIDE 30 MG: 10 INJECTION INTRAVENOUS at 01:06

## 2025-06-26 RX ADMIN — MIDAZOLAM HYDROCHLORIDE 2 MG: 1 INJECTION INTRAMUSCULAR; INTRAVENOUS at 10:06

## 2025-06-26 RX ADMIN — FAMOTIDINE 20 MG: 20 TABLET, FILM COATED ORAL at 09:06

## 2025-06-26 RX ADMIN — ROCURONIUM BROMIDE 50 MG: 10 INJECTION INTRAVENOUS at 11:06

## 2025-06-26 RX ADMIN — ROCURONIUM BROMIDE 20 MG: 10 INJECTION INTRAVENOUS at 12:06

## 2025-06-26 RX ADMIN — Medication 20 MG: at 01:06

## 2025-06-26 RX ADMIN — FENTANYL CITRATE 100 MCG: 50 INJECTION, SOLUTION INTRAMUSCULAR; INTRAVENOUS at 11:06

## 2025-06-26 RX ADMIN — HYDROMORPHONE HYDROCHLORIDE 0.4 MG: 2 INJECTION INTRAMUSCULAR; INTRAVENOUS; SUBCUTANEOUS at 04:06

## 2025-06-26 RX ADMIN — MUPIROCIN: 20 OINTMENT TOPICAL at 08:06

## 2025-06-26 RX ADMIN — SODIUM CHLORIDE, SODIUM LACTATE, POTASSIUM CHLORIDE, AND CALCIUM CHLORIDE: 600; 310; 30; 20 INJECTION, SOLUTION INTRAVENOUS at 12:06

## 2025-06-26 RX ADMIN — PROPOFOL 50 MCG/KG/MIN: 10 INJECTION, EMULSION INTRAVENOUS at 11:06

## 2025-06-26 RX ADMIN — PANTOPRAZOLE SODIUM 40 MG: 40 TABLET, DELAYED RELEASE ORAL at 08:06

## 2025-06-26 RX ADMIN — PHENYLEPHRINE HYDROCHLORIDE 100 MCG: 10 INJECTION INTRAVENOUS at 12:06

## 2025-06-26 RX ADMIN — SODIUM CHLORIDE, SODIUM LACTATE, POTASSIUM CHLORIDE, AND CALCIUM CHLORIDE: 600; 310; 30; 20 INJECTION, SOLUTION INTRAVENOUS at 10:06

## 2025-06-26 RX ADMIN — SUGAMMADEX 400 MG: 100 INJECTION, SOLUTION INTRAVENOUS at 03:06

## 2025-06-26 RX ADMIN — ACETAMINOPHEN 1000 MG: 500 TABLET, FILM COATED ORAL at 09:06

## 2025-06-26 RX ADMIN — OXYCODONE 5 MG: 5 TABLET ORAL at 08:06

## 2025-06-26 RX ADMIN — PHENYLEPHRINE HYDROCHLORIDE 100 MCG: 10 INJECTION INTRAVENOUS at 01:06

## 2025-06-26 RX ADMIN — KETOROLAC TROMETHAMINE 30 MG: 30 INJECTION, SOLUTION INTRAMUSCULAR; INTRAVENOUS at 05:06

## 2025-06-26 RX ADMIN — ALBUMIN (HUMAN) 250 ML: 12.5 SOLUTION INTRAVENOUS at 12:06

## 2025-06-26 RX ADMIN — HEPARIN SODIUM 5000 UNITS: 5000 INJECTION INTRAVENOUS; SUBCUTANEOUS at 10:06

## 2025-06-26 RX ADMIN — ROCURONIUM BROMIDE 20 MG: 10 INJECTION INTRAVENOUS at 02:06

## 2025-06-26 RX ADMIN — CYCLOBENZAPRINE HYDROCHLORIDE 10 MG: 5 TABLET, FILM COATED ORAL at 05:06

## 2025-06-26 RX ADMIN — MUPIROCIN: 20 OINTMENT TOPICAL at 09:06

## 2025-06-26 RX ADMIN — PHENYLEPHRINE HYDROCHLORIDE 200 MCG: 10 INJECTION INTRAVENOUS at 11:06

## 2025-06-26 RX ADMIN — GLYCOPYRROLATE 0.2 MG: 0.2 INJECTION, SOLUTION INTRAMUSCULAR; INTRAVITREAL at 11:06

## 2025-06-26 RX ADMIN — ROCURONIUM BROMIDE 30 MG: 10 INJECTION INTRAVENOUS at 03:06

## 2025-06-26 RX ADMIN — ALBUMIN (HUMAN) 250 ML: 12.5 SOLUTION INTRAVENOUS at 01:06

## 2025-06-26 RX ADMIN — ACETAMINOPHEN 1000 MG: 500 TABLET, FILM COATED ORAL at 11:06

## 2025-06-26 RX ADMIN — OXYCODONE HYDROCHLORIDE 5 MG: 5 TABLET ORAL at 05:06

## 2025-06-26 RX ADMIN — ONDANSETRON HYDROCHLORIDE 4 MG: 2 INJECTION INTRAMUSCULAR; INTRAVENOUS at 11:06

## 2025-06-26 RX ADMIN — DEXAMETHASONE SODIUM PHOSPHATE 8 MG: 4 INJECTION, SOLUTION INTRAMUSCULAR; INTRAVENOUS at 11:06

## 2025-06-26 RX ADMIN — SODIUM CHLORIDE: 9 INJECTION, SOLUTION INTRAVENOUS at 08:06

## 2025-06-26 RX ADMIN — CEFAZOLIN 2 G: 2 INJECTION, POWDER, FOR SOLUTION INTRAMUSCULAR; INTRAVENOUS at 03:06

## 2025-06-26 RX ADMIN — PROPOFOL 160 MG: 10 INJECTION, EMULSION INTRAVENOUS at 11:06

## 2025-06-26 RX ADMIN — LIDOCAINE HYDROCHLORIDE 60 MG: 20 INJECTION, SOLUTION INTRAVENOUS at 11:06

## 2025-06-26 RX ADMIN — ROCURONIUM BROMIDE 30 MG: 10 INJECTION INTRAVENOUS at 12:06

## 2025-06-26 RX ADMIN — IBUPROFEN 800 MG: 400 TABLET, FILM COATED ORAL at 08:06

## 2025-06-26 RX ADMIN — Medication 30 MG: at 11:06

## 2025-06-26 NOTE — INTERVAL H&P NOTE
Wendy Munoz is 45 y.o.  presenting for scheduled VNOTES hysterectomy, possible laparoscopy, removal of tubes, possible removal of ovaries, uterosacral suspension, possible sacrospinous fixation, possible anterior/posterior repair with perineorrhaphy, possible laparotomy, placement of synthetic midurethral sling, and cystourethroscopy .    Temp:  [98.1 °F (36.7 °C)] 98.1 °F (36.7 °C)  Pulse:  [70] 70  Resp:  [16] 16  SpO2:  [100 %] 100 %  BP: (130)/(68) 130/68    General: NAD, alert, oriented, cooperative  HEENT: NCAT, EOM grossly intact  Lungs: Normal WOB  Heart: regular rate  Abdomen: soft, nondistended, nontender, no rebound or guarding    Consents in chart. Pre-operative heparin given at 1034. All questions answered and concerns addressed. To OR for planned procedure.

## 2025-06-26 NOTE — ANESTHESIA PROCEDURE NOTES
Intubation    Date/Time: 6/26/2025 11:23 AM    Performed by: Fela Perez CRNA  Authorized by: Yasir Donovan MD    Intubation:     Induction:  Intravenous    Intubated:  Postinduction    Mask Ventilation:  Easy mask    Attempts:  1    Attempted By:  CRNA    Method of Intubation:  Video laryngoscopy    Blade:  Dobson 3    Laryngeal View Grade: Grade I - full view of cords      Difficult Airway Encountered?: No      Complications:  None    Airway Device:  Oral endotracheal tube    Airway Device Size:  7.0    Style/Cuff Inflation:  Cuffed (inflated to minimal occlusive pressure)    Tube secured:  22    Secured at:  The lips    Placement Verified By:  Capnometry    Complicating Factors:  None    Findings Post-Intubation:  BS equal bilateral and atraumatic/condition of teeth unchanged

## 2025-06-26 NOTE — TRANSFER OF CARE
"Anesthesia Transfer of Care Note    Patient: Wendy Munoz    Procedure(s) Performed: Procedure(s) (LRB):  HYSTERECTOMY,VAGINAL,LAPAROSCOPY-ASSISTED,WITH SALPINGECTOMY (Bilateral)  SLING, MIDURETHRAL (N/A)  CYSTOSCOPY (N/A)  COLPORRHAPHY, COMBINED ANTEROPOSTERIOR    Patient location: PACU    Anesthesia Type: general    Transport from OR: Transported from OR on 6-10 L/min O2 by face mask with adequate spontaneous ventilation    Post pain: adequate analgesia    Post assessment: no apparent anesthetic complications    Post vital signs: stable    Level of consciousness: awake and alert    Nausea/Vomiting: no nausea/vomiting    Complications: none    Transfer of care protocol was followed    Last vitals: Visit Vitals  /68 (BP Location: Right arm, Patient Position: Sitting)   Pulse 70   Temp 36.7 °C (98.1 °F) (Oral)   Resp 16   Ht 5' 9" (1.753 m)   Wt 71.7 kg (158 lb)   LMP 06/06/2025 (Exact Date)   SpO2 100%   Breastfeeding No   BMI 23.33 kg/m²     "

## 2025-06-26 NOTE — ANESTHESIA POSTPROCEDURE EVALUATION
Anesthesia Post Evaluation    Patient: Wendy Munoz    Procedure(s) Performed: Procedure(s) (LRB):  HYSTERECTOMY,VAGINAL,LAPAROSCOPY-ASSISTED,WITH SALPINGECTOMY (Bilateral)  SLING, MIDURETHRAL (N/A)  CYSTOSCOPY (N/A)  COLPORRHAPHY, COMBINED ANTEROPOSTERIOR (N/A)    Final Anesthesia Type: general      Patient location during evaluation: PACU  Patient participation: Yes- Able to Participate  Level of consciousness: awake and alert  Post-procedure vital signs: reviewed and stable  Pain management: adequate  Airway patency: patent  RC mitigation strategies: Extubation while patient is awake  PONV status at discharge: No PONV  Anesthetic complications: no      Cardiovascular status: hemodynamically stable  Respiratory status: unassisted  Hydration status: euvolemic  Follow-up not needed.              Vitals Value Taken Time   /58 06/26/25 18:17   Temp 36.6 °C (97.8 °F) 06/26/25 16:10   Pulse 71 06/26/25 18:23   Resp 18 06/26/25 17:45   SpO2 98 % 06/26/25 18:23   Vitals shown include unfiled device data.      No case tracking events are documented in the log.      Pain/Misael Score: Pain Rating Prior to Med Admin: 6 (6/26/2025  5:32 PM)  Pain Rating Post Med Admin: 6 (6/26/2025  5:50 PM)  Misael Score: 10 (6/26/2025  5:50 PM)

## 2025-06-26 NOTE — OR NURSING
Bedside report received from Margarette LEES. Assuming care of the patient. Patient A&Ox4, all questions and concerns addressed. Patient to be admitted to the floor for observation. Waiting for bed assignment.     Report given to call nurse Cely who will assume care of the patient until transfer.

## 2025-06-26 NOTE — OP NOTE
Date of Operation: 06/26/2025    Title of Operation:  1)  Total vaginal hysterectomy with bilateral salpingectomy ([employer] assist)  2)  Bilateral uterosacral vaginal vault suspension  3)  Anterior repair  4)  Placement of retropubic tension-free midurethral sling, Advantage Fit (PacerPro)  5)  Posterior repair with perineorrhaphy  6)  Cystourethroscopy    Indications for Surgery:  1)  UI:  (+) ISIS. (--) UUI.(--) pads. Daytime frequency: Q 3 hours.  Nocturia: Yes: 1/night.   (--) dysuria,  (--) hematuria,  (--) frequent UTIs.  (+) complete bladder emptying.     2)  POP:  Absent. (--) vaginal discharge. (+) sexually active.  (--) dyspareunia.  (--)  Vaginal dryness.  (--) vaginal estrogen use.   --POP-Q:  Aa 0; Ba 0; C -4; Ap -2; Bp -2; D -7.  Genital hiatus 4, perineal body 3, total vaginal length 10-11.     3)  BM:  (+) constipation/straining, sometimes but taking FeSO4.  (--) chronic diarrhea. (--) hematochezia.  (--) fecal incontinence.  (--) fecal smearing/urgency.  (--) complete evacuation.  Thinks about splinting--rafi when hard.      4)  menorrhagia:  11/2024:  Uterus:  Measures 7.4 x 3.7 x 6.4 cm in dimensions.  No discrete uterine fibroids identified.  The endometrium is normal thickness measuring 10 mm.  Small isoechoic mass noted within the endometrium measuring 1.6 x 1.2 x 0.8 cm, suggestive of a polyp.  Additional focal area of thickening noted in the endocervical canal measuring 0.8 x 0.6 x 0.5 cm.  Ovaries:  The ovaries are normal in size.  The right ovary measures 2.0 x 2.2 x 2.2 cm.  The left ovary measures 3.1 x 1.8 x 2.6 cm. Ovarian follicles noted.  Doppler flow demonstrated to both ovaries.  Free Fluid:  None.  Impression:  Endometrium normal thickness (10 mm).  However, there is a small isoechoic mass within the endometrium, as above, possible polyp.  Additional indeterminate 8 mm focal area of thickening in the endocervical canal.  --2023 L adnexal cyst 2023     --EMBx 2024 secretory,  benign     Changes from last visit:  +ISIS with jumping  Office cystometry:  PVR 10  First sensation 40 mL  Normal first desire 250 mL  Strong desire  320 mL   Urgent desire 400 mL  Impression  (+) ISIS    Preoperative Diagnosis:  1)  Menorrhagia  2)  Cystocele  3)  Rectocele  4)  Uterovaginal prolapse  5)  Constipation  6)  Female stress incontinence    Postoperative Diagnosis:  1)  Menorrhagia  2)  Cystocele  3)  Rectocele  4)  Uterovaginal prolapse  5)  Constipation  6)  Female stress incontinence  7)  Bilateral paratubal cysts     Anesthesia:  General endotracheal anesthesia.  Additionally, a dilute solution of 1% lidocaine with epinephrine was injected vaginally for local anesthesia.    Specimen (Bacteriological, Pathological or other):  Uterus, cervix, bilateral fallopian tubes with paratubal cysts    Vaginal Packing (type and #):   Yes - vaginal packing with KY jelly, #1     Prosthetic Device/Implant:  1)  Advantage Fit sling.  LOT# [id number].      Surgeons Narrative:    Surgeon: Aletha Erwin MD    Assistants:  Sherri Álvarez MD (PGY4).  DAV Lemus (insufficient resident assistance).     Intravenous Fluids:  1800 mL     Estimated Blood Loss:  300 mL     Urine Output:  300 mL     Counts:  Sponge, lap, needle counts correct x 2.     Drains: Rascon catheter.     Disposition:  The patient was sent to the PACU in stable condition.     Findings:     1.  On exam under anesthesia,  normal external female genitalia. There was prolapse as previously noted in clinic.  Uterus and cervix: Enlarged 10 weeks' size.  Adnexa: non palpable.      2. VNOTES:  --bowel was grossly normal  --there were adhesions from the omentum and bowel to the anterior abdominal wall around the level of the umbilicus  --bilateral ovaries were grossly normal  --LEFT fallopian tube with 3 cm paratubal cyst; RIGHT fallopian tube with 1 cm paratubal cyst  --bilateral ureters noted to vermiculate     3.   On cystoscopy, the bladder mucosa  was Normal.  After TVH/BS (VNOTES)/uterosacral suspension/anterior repair/sling, there was no suture or mesh within the bladder mucosa.  The ureteral orifices were visualized bilaterally with (+) noted good efflux x 2. On a systematic survey of the bladder dome to the base of the urethrovesical junction, there were not other abnormalities noted. The urethra was normal on retraction of the scope.    4.  Rectal exam:  At the close of the case, rectal exam was performed.  There was no suture, mesh, or other injury noted on exam.  No obvious masses were palpated.     Description of procedure:    The patient was identified in the preoperative area where informed consent was confirmed, and she was taken to the operating room where an adequate level of general anesthesia was obtained.  The patient was positioned in lithotomy position with legs in Thang stirrups. Care was taken to avoid joint hyperflexion or hyperextension, and all extremity surfaces were carefully padded so as to minimize risk of neurologic injury. Intravenous antibiotics were administered preoperatively. Sequential compression devices were applied to the patient's lower extremities preoperatively and heparin was administered subcutaneously for VTE prophylaxis.  Surgical time-out was performed, where the patient was identified and procedures confirmed.  An examination under anesthesia was performed with findings described as above.  The patient's abdomen, perineum, and vagina were sterilely prepped and draped. A christina catheter was placed in the bladder for drainage.      The procedure commenced with the vaginal hysterectomy.  Both the anterior and posterior lips of the cervix  were grasped with single-tooth tenaculums.  After circumferential injection with the lidocaine/ epinephrine solution, a scapel was then used to circumscribe the cervix at the cervical vaginal junction.  A posterior colpotomy incision was created with Rai scissors.  The posterior  cuff of the vagina was then stitched with a 0 Vicryl suture in running, locking fashion, incorporating the posterior peritoneal edge, to help better establish hemostasis. The long, weighted speculum was then placed into the posterior cul-de-sac after confirm that we were in the intraperitoneal cavity without injury.  The bladder was dissected off the cervix and lower uterine segment. The uterosacral ligaments were clamped with Emigdio clamps, incised, and ligated with 0 Vicryl suture.  This was repeated bilaterally. The cardinal ligaments were then ligated bilaterally, using the same technique.  At this point, using sharp dissection with the Rai scissors, the bladder was further dissected off the lower uterine segment until the vesicouterine peritoneal fold was identified and incised.  After entering the peritoneal cavity, we were able to palpate the Rascon bulb through the bladder to confirm a lack of cystotomy. A right angle retractor was used to retract and protect the bladder. Next, sequential pedicles were created bilaterally along the broad ligament using the Emigdio clamp and ligation with 0 Vicryl sutures including the uterine vascular pedicles.  Once the utero-ovarian pedicles were encountered, each was clamped with a Emigdio clamp across the pedicle.  This was then ligated once with a 0 Vicryl suture on free tie and then secured with a Emigdio style stitch of 0 Vicryl suture.  This was repeated bilaterally. At this point, the uterus and cervix were freed and handed off the table to Pathology for permanent section.  At the completion of this part of the case, all pedicles were examined and several figure-of-eight stitches with 0 Vicryl were placed along the peritoneal edges to ensure adequate hemostasis between the most cephalad pedicle (uterovarian pedicle) to the most caudal pedicle (uterosacral ligament).  At the completion of this, there was excellent hemostasis noted bilaterally.      The ovaries were  palpated bilaterally and felt to be normal without significant mass. The VNOTES ring was inserted vaginally, and the gel port with trocars was attached.  The pelvis was insufflated. The bowel had previously been packed away with packing.  The left tube and ovary were identified.  The left mesosalpinx was serially electrodessicated with a Ligasure device until the complete left tube and cyst were removed.  The same process was repeated on the patient's right side.  All pedicles appeared hemostatic. The VNOTES gel port was removed, and bilateral fallopian tubes were submitted to pathology for further evaluation.  The VNOTES ring and vaginal packing were then removed, as well.     At this point, we proceeded with uterosacral suspension.  Breisky retractors were used to visualize the peritoneal cavity.  The bowel was packed with a moist Kerlix so that the uterosacral ligaments could be visualized bilaterally with care taken not to dislodge any pedicles.  The ischial spines were palpated bilaterally, and the uterosacral ligaments were identified medial and cephalad to these.  We used a long Allis clamp to grasp the right uterosacral ligament approximately at the level of the ischial spine.  A suture of 0 PDS was placed through the uterosacral ligament at approimately the level of the ischial spine.  The suture was then held with a hemostat.  A suture of 0 Prolene was placed approximately 1 cm cephalad to the PDS suture through the uterosacral ligament.  Again, this suture was tagged and held to be tied later.  These steps were next repeated along the patient's left uterosacral ligament, placing a Prolene suture more cephalad and medial and a 0 PDS suture more lateral and caudal. We did palpate the suture to ensure that adequate bites of uterosacral ligament were attained. Care was taken to avoid the ureters while placing all the above sutures.  After placement of bilateral uterosacral suspension sutures, cystourethrscopy  was performed.  Intravenous indigo carmine was injected, and each suture was serially placed on tension.  Excellent bilateral efflux was noted from ureteral orifices with all sutures both off and on tension.      We then proceeded with anterior vaginal repair.  The anterior vaginal epithelium was injected with 1% lidocaine with epinephrine.  A vertical incision was made from the cuff to the urethrovesicle junction.  The vaginal epithelium was dissected off the underlying vesicovaginal fibromuscular connective tissue. At that point, we plicated the vesicovaginal fibromuscular connective tissue using 0 PDS in a interrupted fashion.  This resulted in excellent repair of the cystocele. At this point, attention was returned to the uterosacral suspension sutures. One end of each Prolene suture was placed through the rectovaginal fibromuscular connective tissue posteriorly at approxinately 04:00 and 08:00, with care taken not to come through the vaginal epithelium. The other ends of each Prolene suture were placed  through the vesicovaginal fibromuscular connective tissue anteriorally at approximately 02:00 and 10:00, with care taken not to come through the vaginal epithelium. One end of each PDS suture was placed through the vaginal epithelium just anterior to 03:00 and 09:00. The other end of each PDS suture was placed through the vaginal epithelium just posterior to 03:00 and 09:00. Excess vaginal epithelium was trimmed, and a 2-0 Vicryl suture was used to reapproximate the vertical vaginal epithelium.  This was done in running, locking fashion.  Excellent hemostasis was noted. The uterosacral ligament suspension sutures were tied down without any difficulty and trimmed.  A 0-vicryl was used to close the entirety of the vaginal cuff in anterior to posterior fashion, resulting in complete closure of the defect and coverage of all sutures.  Excellent hemostasis was noted, and the vagina was irrigated. Cystourethroscopy  was again performed.  There was excellent efflux from bilateral ureteral orifices and no obvious suture or other injury to the bladder mucosa.      We then proceeded with placement of the Advantage Fit midurethral sling. The skin was marked just above the symphysis pubis, 2 cm laterally on either side of the midline indicating the exit sites for the trocars.  The Rascon catheter was palpated at the urethrovesical junction, and 2 Allis clamps were placed at the anterior vaginal wall along the midurethra. The Rascon catheter was removed from the patient's bladder. The vaginal epithelium between the two Allis clamps was injected with the 1% lidocaine with epinephrine.  Metzenbaum scissors were used to dissect the vaginal epithelium off the underlying pubocervical muscular tissue in the direction of the angle formed between the ischiopubic ramus and the pubic bone bilaterally. The rigid catheter guide was used to deviate the bladder neck and urethra to the patient's left while the right trocar was advanced to the dissected tract in the patient's right side.  Once the urogenital membrane was perforated, the trocar and handle were reoriented in the sagittal plane and the tip of the trocar was advanced through the retropubic space in intimate proximity to the pubic bone.  The trocar was then advanced through the skin approximately 2 cm to the right of the midline just above the pubic symphysis. The passage of the Advantage Fit trocar was repeated on the patient's left hand side in a similar fashion, using the catheter guide to deviate the bladder neck to the right.  At this point, cystourethroscopy was performed. Cystoscopy was negative for injury after infusion of at least 300 mL in the bladder.  The ureteral orifices were noted to have good efflux bilaterally.  The bladder was then drained. With a #10 Hegar dilator placed between the sling mesh and the urethra, the arms of the sling were elevated above the pubic symphysis  and the plastic sheaths were removed from the mesh arms.  Excess mesh was trimmed beneath the suprapubic skin.  The Hegar dilator ensured that the mesh sling was placed in a tension-free manner.  The vaginal mucosa overlying the sling mesh was closed with a several mattress stitches of 2-0 Vicryl with excellent hemostasis noted.  The suprapubic incisions were closed with dermabond.    Finally, we performed the posterior colporrhaphy and perineorrhaphy.  Allis clamps were placed on the left and right hymenal remnants and at the proximal limit of the rectovaginal septal defect along the vaginal mucosa. The perineal skin to be resected for the perineorrhaphy was grasped with several Allis clamps.  The perineum and vaginal mucosa were injected with 1% lidocaine with epinephrine, which was distributed beneath the perineal skin and vaginal mucosa both in the midline and in the direction of the fornices. The perineal skin within the outlined area was dissected off the underlying perineal body with Metzenbaum scissors.  The posterior vaginal mucosa was then incised in the midline with Metzenbaum scissors.  The left and right margins of the vaginal mucosa were grasped with Allis clamps and the vaginal mucosa was dissected sharply off of the underlying rectovaginal fibromuscular connective tissue.  Individual areas of bleeding were made hemostatic with the electrocautery.  The rectovaginal fibromuscular tissue was exposed bilaterally to the margins of the levator ani muscles and plicated in the midline with a series of vertical mattress stitches of 0-vicryl.  As we approached the introitus, we brought together the perineal body in the midline between the hymenal remnants.  A series of vertical mattress stitches were used to plicate the perineal body beneath the perineal skin thereby plicating the superficial and deep transverse perineal muscles and bulbocavernosus muscles.  Care was taken to make sure that vaginal caliber/  introitus allowed 2 - 3 fingerbreadths to be placed without difficulty. There was no significant posterior vaginal wall contracture/ ridging at the completion of the plication. The vaginal mucosal margins of the vertical incision were trimmed with Metzenbaum scissors to remove less than 1/2 centimeter of reduntant mucosa on each side and reapproximated with a running, locking stitch of 2-0 Vicryl.  Excellent hemostasis was observed along the suture line. Vaginal exam confirmed good reinforcement of the rectocele site without ridging or significant contracture of the vault.  Rectovaginal examination was performed with findings as described above.    At the close of the case, all counts were correct x2.  The vagina was irrigated, and all irrigants were removed.  The vagina was packed with a role of Kerlix, coated with KY jelly for assurance of immediate postop hemostasis.  The Rascon was in place and draining well.  The patient was awakened from general endotracheal anesthesia and was taken to the Recovery Room in stable condition.  She tolerated the procedure well.    I was present and scrubbed for the entire procedure.

## 2025-06-26 NOTE — OPERATIVE NOTE ADDENDUM
Certification of Assistant at Surgery       Surgery Date: 6/26/2025     Participating Surgeons:  Surgeons and Role:     * Aletha Erwin MD - Primary    Procedures:  Procedure(s) (LRB):  HYSTERECTOMY, VAGINAL, LAPAROSCOPY-ASSISTED (N/A)  SALPINGO-OOPHORECTOMY, LAPAROSCOPIC (N/A)  SLING, MIDURETHRAL (N/A)  CYSTOSCOPY, WITH PERIURETHRAL BULKING AGENT INJECTION (N/A)    Assistant Surgeon's Certification of Necessity:  I understand that section 1842 (b) (6) (d) of the Social Security Act generally prohibits Medicare Part B reasonable charge payment for the services of assistants at surgery in teaching hospitals when qualified residents are available to furnish such services. I certify that the services for which payment is claimed were medically necessary, and that no qualified resident was available to perform the services. I further understand that these services are subject to post-payment review by the Medicare carrier.      Josseline Alejandre PA-C    06/26/2025  11:23 AM

## 2025-06-26 NOTE — DISCHARGE SUMMARY
"Discharge Summary  Gynecology      Admit Date: 2025    Discharge Date and Time: 2025     Attending Physician: Aletha Erwin MD    Principal Diagnoses: S/P hysterectomy    Active Hospital Problems    Diagnosis  POA    *S/P vaginal hysterectomy, midurethral sling, cystoscopy [Z90.710]  Unknown      Resolved Hospital Problems   No resolved problems to display.       Procedures: Procedure(s) (LRB):  HYSTERECTOMY, VAGINAL, LAPAROSCOPY-ASSISTED (N/A)  SALPINGO-OOPHORECTOMY, LAPAROSCOPIC (N/A)  SLING, MIDURETHRAL (N/A)  CYSTOSCOPY, WITH PERIURETHRAL BULKING AGENT INJECTION (N/A)    Discharged Condition: good    Hospital Course:  Wendy Munoz is a 45 y.o.  female who presented on 2025 for above procedures for the management of uterovaginal prolapse and stress urinary incontinence. Patient tolerated procedure. Post-operative course was uncomplicated. On day of discharge, patient was urinating, ambulating, and tolerating a regular diet without difficulty. Pain was well controlled on PO medication. She was discharged home on POD#0 in stable condition with instructions to follow up with Dr. Erwin in 6 weeks.     Consults: None    Significant Diagnostic Studies:  No results for input(s): "WBC", "HGB", "HCT", "MCV", "PLT" in the last 168 hours.     Treatments:   1. Surgery as above    Disposition: Home or Self Care    Patient Instructions:   Current Discharge Medication List        START taking these medications    Details   docusate sodium (COLACE) 100 MG capsule Take 1 capsule (100 mg total) by mouth 2 (two) times daily.  Qty: 60 capsule, Refills: 1      oxyCODONE (ROXICODONE) 5 MG immediate release tablet Take 1 tablet (5 mg total) by mouth every 4 (four) hours as needed for Pain.  Qty: 25 tablet, Refills: 0    Comments: Quantity prescribed more than 7 day supply? No  Associated Diagnoses: S/P hysterectomy           CONTINUE these medications which have NOT CHANGED    Details   fish " oil-omega-3 fatty acids 300-1,000 mg capsule Take by mouth once daily.      gabapentin (NEURONTIN) 100 MG capsule Take 1 capsule (100 mg total) by mouth 3 (three) times daily.  Qty: 90 capsule, Refills: 11      magnesium 250 mg Tab Take 400 mg by mouth once.      multivitamin (THERAGRAN) per tablet Take 1 tablet by mouth once daily.      rimegepant (NURTEC) 75 mg odt Take 1 tablet (75 mg total) by mouth as needed for Migraine. Place ODT tablet on the tongue; alternatively the ODT tablet may be placed under the tongue; MAX 1 TAB/24 HOURS  Qty: 8 tablet, Refills: 11    Associated Diagnoses: Chronic migraine without aura, intractable, without status migrainosus      tiZANidine (ZANAFLEX) 2 MG tablet Take 1-2 tablets (2-4 mg total) by mouth 3 (three) times daily.  Qty: 180 tablet, Refills: 11      tretinoin (RETIN-A) 0.025 % cream Apply small amount to entire face every night at bedtime. Wash off every morning and apply sunscreen.  Qty: 45 g, Refills: 3    Associated Diagnoses: Rhytides             Discharge Procedure Orders   Diet general     Call MD for:  temperature >100.4     Call MD for:  persistent nausea and vomiting     Call MD for:  severe uncontrolled pain     Call MD for:  difficulty breathing, headache or visual disturbances     Call MD for:  redness, tenderness, or signs of infection (pain, swelling, redness, odor or green/yellow discharge around incision site)     Call MD for:  hives     Call MD for:  persistent dizziness or light-headedness     Call MD for:  extreme fatigue        Follow-up Information       Aletha Erwin MD Follow up on 8/1/2025.    Specialties: UroGynecology , Gynecology  Contact information:  [address] [address] [phone]

## 2025-06-26 NOTE — DISCHARGE INSTRUCTIONS
To stay on top of your pain, alternate taking ibuprofen 600 mg and tylenol 1000 mg every 6 hours and stagger so you are taking something for pain every 3 hours. You can take oxycodone for breakthrough pain every 4 hours as needed. Oxycodone is a narcotic medication which may constipate you, so stay on top of constipation by hydrating well and continuing to take colace stool softener twice a day. You may take daily Miralax for constipation as needed.

## 2025-06-27 VITALS
RESPIRATION RATE: 18 BRPM | OXYGEN SATURATION: 98 % | SYSTOLIC BLOOD PRESSURE: 115 MMHG | HEART RATE: 65 BPM | HEIGHT: 69 IN | TEMPERATURE: 98 F | BODY MASS INDEX: 24.16 KG/M2 | DIASTOLIC BLOOD PRESSURE: 65 MMHG | WEIGHT: 163.13 LBS

## 2025-06-27 PROBLEM — D64.9 LOW HEMOGLOBIN: Status: ACTIVE | Noted: 2025-06-27

## 2025-06-27 LAB
ABSOLUTE EOSINOPHIL (OHS): 0 K/UL
ABSOLUTE EOSINOPHIL (OHS): 0 K/UL
ABSOLUTE MONOCYTE (OHS): 0.93 K/UL (ref 0.3–1)
ABSOLUTE MONOCYTE (OHS): 1.04 K/UL (ref 0.3–1)
ABSOLUTE NEUTROPHIL COUNT (OHS): 11.74 K/UL (ref 1.8–7.7)
ABSOLUTE NEUTROPHIL COUNT (OHS): 12.35 K/UL (ref 1.8–7.7)
BASOPHILS # BLD AUTO: 0.03 K/UL
BASOPHILS # BLD AUTO: 0.04 K/UL
BASOPHILS NFR BLD AUTO: 0.2 %
BASOPHILS NFR BLD AUTO: 0.3 %
ERYTHROCYTE [DISTWIDTH] IN BLOOD BY AUTOMATED COUNT: 11.9 % (ref 11.5–14.5)
ERYTHROCYTE [DISTWIDTH] IN BLOOD BY AUTOMATED COUNT: 11.9 % (ref 11.5–14.5)
HCT VFR BLD AUTO: 30.7 % (ref 37–48.5)
HCT VFR BLD AUTO: 31.5 % (ref 37–48.5)
HGB BLD-MCNC: 11.1 GM/DL (ref 12–16)
HGB BLD-MCNC: 11.2 GM/DL (ref 12–16)
IMM GRANULOCYTES # BLD AUTO: 0.07 K/UL (ref 0–0.04)
IMM GRANULOCYTES # BLD AUTO: 0.07 K/UL (ref 0–0.04)
IMM GRANULOCYTES NFR BLD AUTO: 0.5 % (ref 0–0.5)
IMM GRANULOCYTES NFR BLD AUTO: 0.5 % (ref 0–0.5)
LYMPHOCYTES # BLD AUTO: 1.28 K/UL (ref 1–4.8)
LYMPHOCYTES # BLD AUTO: 1.89 K/UL (ref 1–4.8)
MCH RBC QN AUTO: 30.7 PG (ref 27–31)
MCH RBC QN AUTO: 30.9 PG (ref 27–31)
MCHC RBC AUTO-ENTMCNC: 35.6 G/DL (ref 32–36)
MCHC RBC AUTO-ENTMCNC: 36.2 G/DL (ref 32–36)
MCV RBC AUTO: 86 FL (ref 82–98)
MCV RBC AUTO: 86 FL (ref 82–98)
NUCLEATED RBC (/100WBC) (OHS): 0 /100 WBC
NUCLEATED RBC (/100WBC) (OHS): 0 /100 WBC
PLATELET # BLD AUTO: 198 K/UL (ref 150–450)
PLATELET # BLD AUTO: 221 K/UL (ref 150–450)
PMV BLD AUTO: 9.3 FL (ref 9.2–12.9)
PMV BLD AUTO: 9.5 FL (ref 9.2–12.9)
RBC # BLD AUTO: 3.59 M/UL (ref 4–5.4)
RBC # BLD AUTO: 3.65 M/UL (ref 4–5.4)
RELATIVE EOSINOPHIL (OHS): 0 %
RELATIVE EOSINOPHIL (OHS): 0 %
RELATIVE LYMPHOCYTE (OHS): 12.8 % (ref 18–48)
RELATIVE LYMPHOCYTE (OHS): 8.7 % (ref 18–48)
RELATIVE MONOCYTE (OHS): 6.3 % (ref 4–15)
RELATIVE MONOCYTE (OHS): 7 % (ref 4–15)
RELATIVE NEUTROPHIL (OHS): 79.5 % (ref 38–73)
RELATIVE NEUTROPHIL (OHS): 84.2 % (ref 38–73)
WBC # BLD AUTO: 14.67 K/UL (ref 3.9–12.7)
WBC # BLD AUTO: 14.77 K/UL (ref 3.9–12.7)

## 2025-06-27 PROCEDURE — 85025 COMPLETE CBC W/AUTO DIFF WBC: CPT | Performed by: OBSTETRICS & GYNECOLOGY

## 2025-06-27 PROCEDURE — 51700 IRRIGATION OF BLADDER: CPT

## 2025-06-27 PROCEDURE — 36415 COLL VENOUS BLD VENIPUNCTURE: CPT | Performed by: OBSTETRICS & GYNECOLOGY

## 2025-06-27 PROCEDURE — 25000003 PHARM REV CODE 250: Performed by: OBSTETRICS & GYNECOLOGY

## 2025-06-27 PROCEDURE — 99900035 HC TECH TIME PER 15 MIN (STAT)

## 2025-06-27 RX ORDER — LEVALBUTEROL INHALATION SOLUTION 0.63 MG/3ML
0.63 SOLUTION RESPIRATORY (INHALATION) EVERY 8 HOURS PRN
Status: DISCONTINUED | OUTPATIENT
Start: 2025-06-27 | End: 2025-06-27 | Stop reason: HOSPADM

## 2025-06-27 RX ADMIN — IBUPROFEN 800 MG: 400 TABLET, FILM COATED ORAL at 08:06

## 2025-06-27 RX ADMIN — GABAPENTIN 100 MG: 100 CAPSULE ORAL at 08:06

## 2025-06-27 RX ADMIN — DOCUSATE SODIUM 100 MG: 100 CAPSULE, LIQUID FILLED ORAL at 08:06

## 2025-06-27 RX ADMIN — OXYCODONE 5 MG: 5 TABLET ORAL at 05:06

## 2025-06-27 RX ADMIN — OXYCODONE 5 MG: 5 TABLET ORAL at 01:06

## 2025-06-27 RX ADMIN — MUPIROCIN: 20 OINTMENT TOPICAL at 08:06

## 2025-06-27 RX ADMIN — ACETAMINOPHEN 1000 MG: 500 TABLET, FILM COATED ORAL at 05:06

## 2025-06-27 RX ADMIN — PANTOPRAZOLE SODIUM 40 MG: 40 TABLET, DELAYED RELEASE ORAL at 08:06

## 2025-06-27 NOTE — PLAN OF CARE
Case Management Final Discharge Note    Discharge Disposition: home    New DME ordered / company name: none    Relevant SDOH / Transition of Care Barriers:  none    Person available to provide assistance at home when needed and their contact information: self    Scheduled followup appointment: Post Op    Referrals placed: none    Transportation: Patient family will transport patient home at discharge.         Patient and family educated on discharge services and updated on DC plan. Bedside RN notified, patient clear to discharge from Case Management Perspective.       [facility] ([address])  Discharge Final Note    Primary Care Provider: Ofelia Arango MD    Expected Discharge Date: 6/27/2025    Final Discharge Note (most recent)       Final Note - 06/27/25 1034          Final Note    Assessment Type Final Discharge Note     Anticipated Discharge Disposition Home or Self Care     Hospital Resources/Appts/Education Provided Provided patient/caregiver with written discharge plan information;Appointments scheduled and added to AVS        Post-Acute Status    Discharge Delays None known at this time                     Important Message from Medicare             Contact Info       Aletha Erwin MD   Specialty: UroGynecology , Gynecology    [address] [address]   Phone: [phone]       Next Steps: Follow up on 8/1/2025

## 2025-06-27 NOTE — OR NURSING
Pt state charles tolerable. Falls off to sleep.  updated. Report by phoneto Arron LEES. Room available. No changes form previous assessment. Prepared for transfer to inpt room.

## 2025-06-27 NOTE — NURSING
Patient AAOx4. VSS on RA, NADN.   During shift: pain management, NS @75 mL/hr.   Patient repositions self independently in bed, ambulatory w/ SBA to CELINA Rascon remains in place per MD orders.     Monitor labs.   POC reviewed with patient and significant other, questions answered, concerns acknowledged.  Safety measures in place, call light within reach.

## 2025-06-27 NOTE — PROGRESS NOTES
Progress Note  Gynecology    Admit Date: 2025  LOS: 0    Reason for Admission:  S/P hysterectomy    SUBJECTIVE:     Wendy Munoz is a 45 y.o.  who is POD#1 s/p TVH/ BS/Colporrhaphy/MUS/Cysto  for the treatment of menorrhagia and POP.    Pt doing well this morning. Pain is well controlled, she had 2 oxy 5 overnight and 1 this AM. She is not yet tolerating a regular diet. Did tolerate liquids overnight\ without N/V. Ambulating without difficulty. Voiding without difficulty via christina. Not yet passing flatus. She is not yet having bowel movements.    OBJECTIVE:     Vital Signs   Temp:  [97.7 °F (36.5 °C)-98.3 °F (36.8 °C)] 98.3 °F (36.8 °C)  Pulse:  [68-86] 82  Resp:  [14-18] 14  SpO2:  [98 %-100 %] 98 %  BP: (104-130)/(57-74) 109/58      Intake/Output Summary (Last 24 hours) at 2025 0613  Last data filed at 2025 0539  Gross per 24 hour   Intake 2360 ml   Output 1325 ml   Net 1035 ml       Physical Exam:  Gen: A&Ox3, NAD  Pulm: Normal respiratory effort  Abd: Soft, non-distended, non-tender to palpation without rebound or guarding  Inc: none  Ext: PPP, no peripheral edema, TEDs/SCDs in place  : Christina in place draining clear yellow urine. Vaginal packing removed in tact with 25% saturation.    Laboratory:  Recent Labs   Lab 25  0426   WBC 14.67*   HGB 11.1*   HCT 30.7*   MCV 86           ASSESSMENT/PLAN:     Active Hospital Problems    Diagnosis  POA    *S/P vaginal hysterectomy, midurethral sling, cystoscopy [Z90.710]  Yes    Low hemoglobin [D64.9]  Unknown      Resolved Hospital Problems   No resolved problems to display.       Assessment: 45 y.o.  TVH/ BS/Colporrhaphy/MUS/Cysto    Plan:   1. Post-op   - Routine post-op advances  - Continue PRN pain medications: Oxy 5 IR x 3 , no Dilaudid, scheduled tylenol/ibuprofen  - D/C christina AFTER breakfast and AFTER repeat CBC and perform active voiding trial  - Encourage ambulation   - Encourage IS  - CBC this AM   - UOP  adequate: 0.96 cc/kg/hr  - Continue IVF until tolerating regular diet.  - Antiemetics prn nausea/vomiting.    2. Chronic Pain  - Continue gabapentin scheduled and tizanidine PRN  - Gabapentin declined overnight    3. Chronic Constipation  - Colace BID ordered    4. Asthma  - Stable  - Albuterol PRN ordered    5. Migraines  - Asymptomatic   - No home medications    Dispo: Continue inpatient mgmt for observation. As patient meets appropriate post-op milestones, plan for discharge to home.    Sherri Álvarez MD PGY-4  Obstetrics and Gynecology

## 2025-06-30 LAB
ESTROGEN SERPL-MCNC: NORMAL PG/ML
INSULIN SERPL-ACNC: NORMAL U[IU]/ML
LAB AP CLINICAL INFORMATION: NORMAL
LAB AP DIAGNOSIS CATEGORY: NORMAL
LAB AP GROSS DESCRIPTION: NORMAL
LAB AP PERFORMING LOCATION(S): NORMAL
LAB AP REPORT FOOTNOTES: NORMAL

## 2025-07-03 ENCOUNTER — RESULTS FOLLOW-UP (OUTPATIENT)
Dept: UROGYNECOLOGY | Facility: CLINIC | Age: 45
End: 2025-07-03

## 2025-07-16 ENCOUNTER — PATIENT MESSAGE (OUTPATIENT)
Dept: UROGYNECOLOGY | Facility: CLINIC | Age: 45
End: 2025-07-16
Payer: COMMERCIAL

## 2025-07-17 ENCOUNTER — LAB VISIT (OUTPATIENT)
Dept: LAB | Facility: HOSPITAL | Age: 45
End: 2025-07-17
Attending: NURSE PRACTITIONER
Payer: COMMERCIAL

## 2025-07-17 ENCOUNTER — PATIENT MESSAGE (OUTPATIENT)
Dept: UROGYNECOLOGY | Facility: CLINIC | Age: 45
End: 2025-07-17
Payer: COMMERCIAL

## 2025-07-17 DIAGNOSIS — R35.0 URINARY FREQUENCY: Primary | ICD-10-CM

## 2025-07-17 DIAGNOSIS — N39.3 URINARY, INCONTINENCE, STRESS FEMALE: ICD-10-CM

## 2025-07-17 DIAGNOSIS — N39.3 URINARY, INCONTINENCE, STRESS FEMALE: Primary | ICD-10-CM

## 2025-07-17 PROCEDURE — 87086 URINE CULTURE/COLONY COUNT: CPT

## 2025-07-17 RX ORDER — NITROFURANTOIN 25; 75 MG/1; MG/1
100 CAPSULE ORAL 2 TIMES DAILY
Qty: 14 CAPSULE | Refills: 0 | Status: SHIPPED | OUTPATIENT
Start: 2025-07-17

## 2025-07-17 NOTE — TELEPHONE ENCOUNTER
Pt messages stating she believes she has a UTI and wants to be seen. Pt has no history of UTIs. Nurse relays information to ROSANA Tilley NP. Pt is then told to follow up with PCP or Urgent Care. Pt mentions she is post op 6/26/25. Plan is changed to have pt drop off urine at lab rather than follow up with PCP or urgent care. Pt aware.

## 2025-07-19 LAB — BACTERIA UR CULT: NO GROWTH

## 2025-08-01 ENCOUNTER — OFFICE VISIT (OUTPATIENT)
Dept: UROGYNECOLOGY | Facility: CLINIC | Age: 45
End: 2025-08-01
Payer: COMMERCIAL

## 2025-08-01 VITALS
HEIGHT: 69 IN | HEART RATE: 71 BPM | WEIGHT: 155.44 LBS | DIASTOLIC BLOOD PRESSURE: 72 MMHG | BODY MASS INDEX: 23.02 KG/M2 | SYSTOLIC BLOOD PRESSURE: 105 MMHG

## 2025-08-01 DIAGNOSIS — N81.4 UTEROVAGINAL PROLAPSE: ICD-10-CM

## 2025-08-01 DIAGNOSIS — K59.09 CHRONIC CONSTIPATION: Primary | ICD-10-CM

## 2025-08-01 PROCEDURE — 1160F RVW MEDS BY RX/DR IN RCRD: CPT | Mod: CPTII,S$GLB,, | Performed by: OBSTETRICS & GYNECOLOGY

## 2025-08-01 PROCEDURE — 3078F DIAST BP <80 MM HG: CPT | Mod: CPTII,S$GLB,, | Performed by: OBSTETRICS & GYNECOLOGY

## 2025-08-01 PROCEDURE — 99024 POSTOP FOLLOW-UP VISIT: CPT | Mod: S$GLB,,, | Performed by: OBSTETRICS & GYNECOLOGY

## 2025-08-01 PROCEDURE — 99999 PR PBB SHADOW E&M-EST. PATIENT-LVL IV: CPT | Mod: PBBFAC,,, | Performed by: OBSTETRICS & GYNECOLOGY

## 2025-08-01 PROCEDURE — 3074F SYST BP LT 130 MM HG: CPT | Mod: CPTII,S$GLB,, | Performed by: OBSTETRICS & GYNECOLOGY

## 2025-08-01 PROCEDURE — 1159F MED LIST DOCD IN RCRD: CPT | Mod: CPTII,S$GLB,, | Performed by: OBSTETRICS & GYNECOLOGY

## 2025-08-01 NOTE — PROGRESS NOTES
Urogyn follow up  08/02/2025    [facility] - UROGYNECOLOGY  [address] [address]    Wendy Munoz  [id number]  1980      Wendy Munoz is a 45 y.o. here for postop.      Date of Operation: 06/26/2025     Title of Operation:  1)  Total vaginal hysterectomy with bilateral salpingectomy (VNOTES assist)  2)  Bilateral uterosacral vaginal vault suspension  3)  Anterior repair  4)  Placement of retropubic tension-free midurethral sling, Advantage Fit (First30Days)  5)  Posterior repair with perineorrhaphy  6)  Cystourethroscopy     Indications for Surgery:  1)  UI:  (+) ISIS. (--) UUI.(--) pads. Daytime frequency: Q 3 hours.  Nocturia: Yes: 1/night.   (--) dysuria,  (--) hematuria,  (--) frequent UTIs.  (+) complete bladder emptying.     2)  POP:  Absent. (--) vaginal discharge. (+) sexually active.  (--) dyspareunia.  (--)  Vaginal dryness.  (--) vaginal estrogen use.   --POP-Q:  Aa 0; Ba 0; C -4; Ap -2; Bp -2; D -7.  Genital hiatus 4, perineal body 3, total vaginal length 10-11.      3)  BM:  (+) constipation/straining, sometimes but taking FeSO4.  (--) chronic diarrhea. (--) hematochezia.  (--) fecal incontinence.  (--) fecal smearing/urgency.  (--) complete evacuation.  Thinks about splinting--rafi when hard.      4)  menorrhagia:  11/2024:  Uterus:  Measures 7.4 x 3.7 x 6.4 cm in dimensions.  No discrete uterine fibroids identified.  The endometrium is normal thickness measuring 10 mm.  Small isoechoic mass noted within the endometrium measuring 1.6 x 1.2 x 0.8 cm, suggestive of a polyp.  Additional focal area of thickening noted in the endocervical canal measuring 0.8 x 0.6 x 0.5 cm.  Ovaries:  The ovaries are normal in size.  The right ovary measures 2.0 x 2.2 x 2.2 cm.  The left ovary measures 3.1 x 1.8 x 2.6 cm. Ovarian follicles noted.  Doppler flow demonstrated to both ovaries.  Free Fluid:  None.  Impression:  Endometrium normal thickness (10 mm).  However, there is  "a small isoechoic mass within the endometrium, as above, possible polyp.  Additional indeterminate 8 mm focal area of thickening in the endocervical canal.  --2023 L adnexal cyst 2023     --EMBx 2024 secretory, benign     Changes from last visit:  +ISIS with jumping  Office cystometry:  PVR 10  First sensation 40 mL  Normal first desire 250 mL  Strong desire  320 mL   Urgent desire 400 mL  Impression  (+) ISIS     Preoperative Diagnosis:  1)  Menorrhagia  2)  Cystocele  3)  Rectocele  4)  Uterovaginal prolapse  5)  Constipation  6)  Female stress incontinence     Postoperative Diagnosis:  1)  Menorrhagia  2)  Cystocele  3)  Rectocele  4)  Uterovaginal prolapse  5)  Constipation  6)  Female stress incontinence  7)  Bilateral paratubal cysts     Issues include:  Problem List[1]    History since last visit:   GYN:  occasional VB--none x last 5 days; still having some mild discharge; no significant pain; no s/sx POP  Bladder issues: No UI, dysuria, hematuria; +complete emptying  Bowel issues: Colace + fiber daily; mostly no constipation  7/2025 urine C&S NEG    Medications:  Current Medications[2]    ROS:  As per HPI.      Exam  /72 (Patient Position: Sitting)   Pulse 71   Ht 5' 9" (1.753 m)   Wt 70.5 kg (155 lb 6.8 oz)   BMI 22.95 kg/m²   General: alert and oriented, no acute distress  Respiratory: normal respiratory effort  Abd: soft, non-tender, non-distended  SP incisions well-healed    Pelvic  Ext. Genitalia: normal external genitalia. Normal bartholins and skenes glands  Vagina: NEG atrophy. Normal vaginal mucosa without lesions. No discharge noted.   Non-tender bladder base without palpable mass. Sutures well-healing, intact, NT. Sling site NT, no mesh visible/palpable.   Cervix: absent  Uterus:  surgically absent, vaginal cuff well healing  Urethra: no masses or tenderness  Urethral meatus: no lesions, caruncle or prolapse.    POP-Q:  deferred.  No POP with valsalva,     Impression  1. Chronic " constipation        2. s/p TVH/BS (VNOTES)/USS/A&P/retro MUS 6-          We reviewed the above issues and discussed options for short-term versus long-term management of their problems.   Plan:     1) Postop state: Healing well.  Continue restrictions for now.   2) Think about when you'd like to start work, let me know, and can write letter.   2) They will follow up with us in 1 month.     I spent a total of 20 minutes on the day of the visit. This includes face to face time and non-face to face time preparing to see the patient (eg, review of tests), obtaining and/or reviewing separately obtained history, documenting clinical information in the electronic or other health record, independently interpreting results and communicating results to the patient/family/caregiver, or care coordinator. This time is separate and distinct from the procedure(s) performed.      Aletha Erwin MD  [facility]  Division of Female Pelvic Medicine and Reconstructive Surgery  Department of Obstetrics & Gynecology           [1]   Patient Active Problem List  Diagnosis    Migraine headache    Increased risk of breast cancer    Dense breast tissue on mammogram    Family history of breast cancer    Cervical myofascial pain syndrome    Chronic migraine without aura, intractable, without status migrainosus    Decreased ROM of neck    Sesamoiditis of right foot    Acute right hip pain    Weakness of both hips    Weakness of trunk musculature    Essential tremor    Screen for STD (sexually transmitted disease)    Right lower quadrant pain    History of skin cancer    Cervical spondylosis    Chronic constipation    Rectocele, female    Uterovaginal prolapse    Mixed stress and urge urinary incontinence    S/P vaginal hysterectomy, midurethral sling, cystoscopy    Low hemoglobin   [2]   Current Outpatient Medications:     ibuprofen (ADVIL,MOTRIN) 600 MG tablet, Take 1 tablet (600 mg total) by mouth every 6 (six) hours as  needed for Pain., Disp: 32 tablet, Rfl: 0    magnesium 250 mg Tab, Take 400 mg by mouth once., Disp: , Rfl:     multivitamin (THERAGRAN) per tablet, Take 1 tablet by mouth once daily., Disp: , Rfl:     rimegepant (NURTEC) 75 mg odt, Take 1 tablet (75 mg total) by mouth as needed for Migraine. Place ODT tablet on the tongue; alternatively the ODT tablet may be placed under the tongue; MAX 1 TAB/24 HOURS, Disp: 8 tablet, Rfl: 11    tretinoin (RETIN-A) 0.025 % cream, Apply small amount to entire face every night at bedtime. Wash off every morning and apply sunscreen., Disp: 45 g, Rfl: 3    fish oil-omega-3 fatty acids 300-1,000 mg capsule, Take by mouth once daily., Disp: , Rfl:     gabapentin (NEURONTIN) 100 MG capsule, Take 1 capsule (100 mg total) by mouth 3 (three) times daily. (Patient not taking: Reported on 8/1/2025), Disp: 90 capsule, Rfl: 11    nitrofurantoin, macrocrystal-monohydrate, (MACROBID) 100 MG capsule, Take 1 capsule (100 mg total) by mouth 2 (two) times daily., Disp: 14 capsule, Rfl: 0    oxyCODONE (ROXICODONE) 5 MG immediate release tablet, Take 1 tablet (5 mg total) by mouth every 4 (four) hours as needed for Pain., Disp: 25 tablet, Rfl: 0    tiZANidine (ZANAFLEX) 2 MG tablet, Take 1-2 tablets (2-4 mg total) by mouth 3 (three) times daily. (Patient not taking: Reported on 8/1/2025), Disp: 180 tablet, Rfl: 11    Current Facility-Administered Medications:     albuterol inhaler 2 puff, 2 puff, Inhalation, Q20 Min TESSAN, Yasir Callahan MD

## 2025-08-04 ENCOUNTER — TELEPHONE (OUTPATIENT)
Dept: UROGYNECOLOGY | Facility: CLINIC | Age: 45
End: 2025-08-04
Payer: COMMERCIAL

## 2025-08-04 ENCOUNTER — PATIENT MESSAGE (OUTPATIENT)
Dept: UROGYNECOLOGY | Facility: CLINIC | Age: 45
End: 2025-08-04
Payer: COMMERCIAL

## 2025-08-04 NOTE — TELEPHONE ENCOUNTER
----- Message from Aletha Erwin MD sent at 8/2/2025  5:52 PM CDT -----  Regarding: Can we move patient from 830 to 8 on 9/5?  Can we move patient from 830 to 8 on 9/5? Mindi--can you open an 8 AM spot to help.   Trying not to double book unless really needed.   Thanks!

## 2025-08-04 NOTE — TELEPHONE ENCOUNTER
This patient states she can come at 8 am on 9/5/25.    She also states she would like to request for Dr. Erwin to approve her disability leave until 8/14. Her original RTW date was 8/7. Pt states this was discussed with Dr. Erwin at last Friday's appointment.

## 2025-08-04 NOTE — TELEPHONE ENCOUNTER
Left message regarding trying to reach pt to schedule. Left call back number for return call.   Explained that Dr. Erwin is requesting pt come a bit earlier.

## 2025-08-06 ENCOUNTER — PATIENT MESSAGE (OUTPATIENT)
Dept: UROGYNECOLOGY | Facility: CLINIC | Age: 45
End: 2025-08-06
Payer: COMMERCIAL

## 2025-08-07 ENCOUNTER — PATIENT MESSAGE (OUTPATIENT)
Dept: UROGYNECOLOGY | Facility: CLINIC | Age: 45
End: 2025-08-07
Payer: COMMERCIAL

## 2025-08-13 ENCOUNTER — PATIENT MESSAGE (OUTPATIENT)
Dept: HEMATOLOGY/ONCOLOGY | Facility: CLINIC | Age: 45
End: 2025-08-13
Payer: COMMERCIAL

## 2025-08-13 ENCOUNTER — HOSPITAL ENCOUNTER (OUTPATIENT)
Dept: RADIOLOGY | Facility: OTHER | Age: 45
Discharge: HOME | End: 2025-08-13
Attending: NURSE PRACTITIONER
Payer: COMMERCIAL

## 2025-08-13 DIAGNOSIS — Z80.3 FAMILY HISTORY OF BREAST CANCER: ICD-10-CM

## 2025-08-13 DIAGNOSIS — R92.343 EXTREMELY DENSE TISSUE OF BOTH BREASTS ON MAMMOGRAPHY: ICD-10-CM

## 2025-08-13 DIAGNOSIS — Z91.89 AT HIGH RISK FOR BREAST CANCER: ICD-10-CM

## 2025-08-13 PROCEDURE — 77049 MRI BREAST C-+ W/CAD BI: CPT | Mod: TC

## 2025-08-13 PROCEDURE — 77049 MRI BREAST C-+ W/CAD BI: CPT | Mod: 26,,, | Performed by: RADIOLOGY

## 2025-08-13 PROCEDURE — A9577 INJ MULTIHANCE: HCPCS | Performed by: NURSE PRACTITIONER

## 2025-08-13 PROCEDURE — 25500020 PHARM REV CODE 255: Performed by: NURSE PRACTITIONER

## 2025-08-13 RX ADMIN — GADOBENATE DIMEGLUMINE 14 ML: 529 INJECTION, SOLUTION INTRAVENOUS at 02:08

## 2025-09-05 ENCOUNTER — OFFICE VISIT (OUTPATIENT)
Dept: UROGYNECOLOGY | Facility: CLINIC | Age: 45
End: 2025-09-05
Payer: COMMERCIAL

## 2025-09-05 VITALS
DIASTOLIC BLOOD PRESSURE: 80 MMHG | BODY MASS INDEX: 23.47 KG/M2 | SYSTOLIC BLOOD PRESSURE: 121 MMHG | HEIGHT: 69 IN | WEIGHT: 158.5 LBS | HEART RATE: 69 BPM

## 2025-09-05 DIAGNOSIS — N81.4 UTEROVAGINAL PROLAPSE: ICD-10-CM

## 2025-09-05 DIAGNOSIS — M79.18 MYALGIA OF PELVIC FLOOR: ICD-10-CM

## 2025-09-05 DIAGNOSIS — N95.2 VAGINAL ATROPHY: Primary | ICD-10-CM

## 2025-09-05 DIAGNOSIS — R29.898 WEAKNESS OF BACK: ICD-10-CM

## 2025-09-05 DIAGNOSIS — K59.09 CHRONIC CONSTIPATION: ICD-10-CM

## 2025-09-05 PROCEDURE — 99999 PR PBB SHADOW E&M-EST. PATIENT-LVL V: CPT | Mod: PBBFAC,,, | Performed by: OBSTETRICS & GYNECOLOGY

## 2025-09-05 RX ORDER — ESTRADIOL 0.1 MG/G
CREAM VAGINAL
Qty: 42.5 G | Refills: 11 | Status: SHIPPED | OUTPATIENT
Start: 2025-09-05

## (undated) DEVICE — CANNULA ENDOPATH XCEL 5X100MM

## (undated) DEVICE — GOWN NONREINF SET-IN SLV XL

## (undated) DEVICE — SOL NORMAL USPCA 0.9%

## (undated) DEVICE — SYR IRRIGATION BULB STER 60ML

## (undated) DEVICE — SEALER LIGASURE MARYLAND 37CM

## (undated) DEVICE — CONTAINER SPEC OR STRL 4.5OZ

## (undated) DEVICE — GLOVE SENSICARE PI GRN 6.5

## (undated) DEVICE — GOWN ECLIPSE REINF LVL4 TWL LG

## (undated) DEVICE — SOL ELECTROLUBE ANTI-STIC

## (undated) DEVICE — GLOVE SENSICARE PI GRN 7

## (undated) DEVICE — DRAPE STERI LONG

## (undated) DEVICE — DRAPE THREE-QTR REINF 53X77IN

## (undated) DEVICE — TOWEL OR DISP STRL BLUE 4/PK

## (undated) DEVICE — SOL IRR SOD CHL .9% POUR

## (undated) DEVICE — SUT PDS II 0 CT VIL MONO 36

## (undated) DEVICE — GLOVE SENSICARE PI SURG 7

## (undated) DEVICE — KIT GELPOINT TRNSVAG ACC 9.5CM

## (undated) DEVICE — KIT WING PAD POSITIONING

## (undated) DEVICE — DRAPE UND BUTT W/POUCH 40X44IN

## (undated) DEVICE — ELECTRODE NEEDLE 1IN

## (undated) DEVICE — NDL INSUFFLATION VERRES 120MM

## (undated) DEVICE — NDL MAYO CAT 1/2 CIR #6

## (undated) DEVICE — ADHESIVE DERMABOND ADVANCED

## (undated) DEVICE — SPONGE LAP VAG STRL DMT 4X36

## (undated) DEVICE — SUT VICRYL CTD 2-0 GI 27 SH

## (undated) DEVICE — Device

## (undated) DEVICE — SUT PDS II O CT-2 VIL MONO

## (undated) DEVICE — BLADE SURG STAINLESS STEEL #15

## (undated) DEVICE — SUT VICRYL 0 27 CT-2

## (undated) DEVICE — IRRIGATOR ENDOSCOPY DISP.

## (undated) DEVICE — ELECTRODE REM PLYHSV RETURN 9

## (undated) DEVICE — GLOVE SENSICARE PI SURG 6.5

## (undated) DEVICE — SUT PROLENE 0 MO6 30IN BLUE

## (undated) DEVICE — SOL STRL WATER INJ 1000ML BG

## (undated) DEVICE — SET IRR CYSTO 4 LEAD 90IN

## (undated) DEVICE — TROCAR ENDOPATH XCEL 11MM 10CM

## (undated) DEVICE — DRAPE STERI INSTRUMENT 1018

## (undated) DEVICE — SOL POVIDONE PREP IODINE 4 OZ

## (undated) DEVICE — TROCAR ENDOPATH XCEL 11X100MM

## (undated) DEVICE — SPONGE LAP 18X18 PREWASHED

## (undated) DEVICE — SUT PDS II 2-0 CT-2 VIL

## (undated) DEVICE — SUT 0 8-27IN VICRYL PL CT-1

## (undated) DEVICE — PENCIL ELECTROSURG HOLST W/BLD

## (undated) DEVICE — SYR 10CC LUER LOCK

## (undated) DEVICE — PAD PREP CUFFED NS 24X48IN

## (undated) DEVICE — TROCAR ENDOPATH XCEL 5X100MM

## (undated) DEVICE — SYS SEE SHARP SCP ANTIFG LNG

## (undated) DEVICE — PACK LAPAROSCOPY BAPTIST

## (undated) DEVICE — SOL POVIDONE SCRUB IODINE 4 OZ

## (undated) DEVICE — CLIPPER BLADE MOD 4406 (CAREF)